# Patient Record
Sex: FEMALE | Race: WHITE | NOT HISPANIC OR LATINO | Employment: FULL TIME | URBAN - METROPOLITAN AREA
[De-identification: names, ages, dates, MRNs, and addresses within clinical notes are randomized per-mention and may not be internally consistent; named-entity substitution may affect disease eponyms.]

---

## 2017-05-21 DIAGNOSIS — F41.9 ANXIETY: ICD-10-CM

## 2017-05-21 NOTE — TELEPHONE ENCOUNTER
Buspirone       Last Written Prescription Date: 02/19/17  Last Fill Quantity: 360; # refills: 3  Last Office Visit with [facility], [facility] or  Health prescribing provider:  03/20/15        Last PHQ-9 score on record=   PHQ-9 SCORE 3/20/2015   Total Score 1       Lab Results   Component Value Date    AST 23 06/23/2014     Lab Results   Component Value Date    ALT 19 06/23/2014

## 2017-05-21 NOTE — LETTER
[facility]                                             [address] [place]  [address]    May 25, 2017    Zaida Lao  [address] [facility] [address]    Dear Zaida,       We recently received a refill request for busPIRone (BUSPAR) 15 MG tablet.  We have refilled this for a one time 30 day supply only because you are due for a:    Anxiety office visit      Please call at your earliest convenience so that there will not be a delay with your future refills.          Thank you,   Your [facility] Care Team/  [phone]

## 2017-05-22 NOTE — TELEPHONE ENCOUNTER
Patient last seen by Elzbieta Pulido in 2015.  Patient has seen Yara Han PA-C for anxiety on 11-25-16.  Will send request to her.

## 2017-05-23 NOTE — TELEPHONE ENCOUNTER
Patient was instructed to follow up with WALTER Chiang in April with anxiety.  [employer] message sent to patient.   See message for details.    Adele Mahajan RN

## 2017-05-24 RX ORDER — BUSPIRONE HYDROCHLORIDE 15 MG/1
TABLET ORAL
Qty: 120 TABLET | Refills: 0 | Status: SHIPPED | OUTPATIENT
Start: 2017-05-24 | End: 2017-06-30

## 2017-06-18 DIAGNOSIS — F41.9 ANXIETY: ICD-10-CM

## 2017-06-20 ENCOUNTER — TELEPHONE (OUTPATIENT)
Dept: FAMILY MEDICINE | Facility: CLINIC | Age: 42
End: 2017-06-20

## 2017-06-21 RX ORDER — ESCITALOPRAM OXALATE 20 MG/1
TABLET ORAL
Qty: 90 TABLET | Refills: 1 | Status: SHIPPED | OUTPATIENT
Start: 2017-06-21 | End: 2017-06-30

## 2017-06-30 ENCOUNTER — OFFICE VISIT (OUTPATIENT)
Dept: FAMILY MEDICINE | Facility: CLINIC | Age: 42
End: 2017-06-30
Payer: COMMERCIAL

## 2017-06-30 ENCOUNTER — RADIANT APPOINTMENT (OUTPATIENT)
Dept: MAMMOGRAPHY | Facility: CLINIC | Age: 42
End: 2017-06-30
Payer: COMMERCIAL

## 2017-06-30 VITALS
WEIGHT: 227 LBS | HEART RATE: 82 BPM | BODY MASS INDEX: 35.63 KG/M2 | SYSTOLIC BLOOD PRESSURE: 125 MMHG | DIASTOLIC BLOOD PRESSURE: 80 MMHG | HEIGHT: 67 IN | OXYGEN SATURATION: 99 %

## 2017-06-30 DIAGNOSIS — Z00.00 PREVENTATIVE HEALTH CARE: ICD-10-CM

## 2017-06-30 DIAGNOSIS — Z12.9 CANCER SCREENING: ICD-10-CM

## 2017-06-30 DIAGNOSIS — Z00.00 ROUTINE GENERAL MEDICAL EXAMINATION AT A HEALTH CARE FACILITY: Primary | ICD-10-CM

## 2017-06-30 DIAGNOSIS — J30.2 SEASONAL ALLERGIC RHINITIS, UNSPECIFIED CHRONICITY, UNSPECIFIED TRIGGER: ICD-10-CM

## 2017-06-30 DIAGNOSIS — F41.0 ANXIETY ATTACK: ICD-10-CM

## 2017-06-30 DIAGNOSIS — F41.9 ANXIETY: ICD-10-CM

## 2017-06-30 LAB
ANION GAP SERPL CALCULATED.3IONS-SCNC: 6 MMOL/L (ref 3–14)
BUN SERPL-MCNC: 11 MG/DL (ref 7–30)
CALCIUM SERPL-MCNC: 8.9 MG/DL (ref 8.5–10.1)
CHLORIDE SERPL-SCNC: 106 MMOL/L (ref 94–109)
CHOLEST SERPL-MCNC: 178 MG/DL
CO2 SERPL-SCNC: 29 MMOL/L (ref 20–32)
CREAT SERPL-MCNC: 0.72 MG/DL (ref 0.52–1.04)
GFR SERPL CREATININE-BSD FRML MDRD: NORMAL ML/MIN/1.7M2
GLUCOSE SERPL-MCNC: 86 MG/DL (ref 70–99)
HDLC SERPL-MCNC: 55 MG/DL
LDLC SERPL CALC-MCNC: 97 MG/DL
NONHDLC SERPL-MCNC: 123 MG/DL
POTASSIUM SERPL-SCNC: 4.3 MMOL/L (ref 3.4–5.3)
SODIUM SERPL-SCNC: 141 MMOL/L (ref 133–144)
TRIGL SERPL-MCNC: 128 MG/DL

## 2017-06-30 PROCEDURE — 87624 HPV HI-RISK TYP POOLED RSLT: CPT | Performed by: PHYSICIAN ASSISTANT

## 2017-06-30 PROCEDURE — 99396 PREV VISIT EST AGE 40-64: CPT | Performed by: PHYSICIAN ASSISTANT

## 2017-06-30 PROCEDURE — G0145 SCR C/V CYTO,THINLAYER,RESCR: HCPCS | Performed by: PHYSICIAN ASSISTANT

## 2017-06-30 PROCEDURE — G0202 SCR MAMMO BI INCL CAD: HCPCS | Mod: TC

## 2017-06-30 PROCEDURE — 80048 BASIC METABOLIC PNL TOTAL CA: CPT | Performed by: PHYSICIAN ASSISTANT

## 2017-06-30 PROCEDURE — 82306 VITAMIN D 25 HYDROXY: CPT | Performed by: PHYSICIAN ASSISTANT

## 2017-06-30 PROCEDURE — 80061 LIPID PANEL: CPT | Performed by: PHYSICIAN ASSISTANT

## 2017-06-30 PROCEDURE — 36415 COLL VENOUS BLD VENIPUNCTURE: CPT | Performed by: PHYSICIAN ASSISTANT

## 2017-06-30 RX ORDER — ALPRAZOLAM 0.25 MG
0.25 TABLET ORAL DAILY PRN
Qty: 20 TABLET | Refills: 0 | Status: SHIPPED | OUTPATIENT
Start: 2017-06-30 | End: 2018-08-10

## 2017-06-30 RX ORDER — ESCITALOPRAM OXALATE 20 MG/1
TABLET ORAL
Qty: 90 TABLET | Refills: 1 | Status: SHIPPED | OUTPATIENT
Start: 2017-06-30 | End: 2018-08-10

## 2017-06-30 RX ORDER — BUSPIRONE HYDROCHLORIDE 15 MG/1
TABLET ORAL
Qty: 120 TABLET | Refills: 0 | Status: SHIPPED | OUTPATIENT
Start: 2017-06-30 | End: 2017-09-08

## 2017-06-30 NOTE — PROGRESS NOTES
SUBJECTIVE:   CC: Zaida Lao is an 42 year old woman who presents for preventive health visit.     Healthy Habits:    Do you get at least three servings of calcium containing foods daily (dairy, green leafy vegetables, etc.)? yes    Amount of exercise or daily activities, outside of work: about 5 days a week     Problems taking medications regularly No    Medication side effects: no    Have you had an eye exam in the past two years? no    Do you see a dentist twice per year? yes  Do you have sleep apnea, excessive snoring or daytime drowsiness?no      Pt needs refill on Lexapro, Xanax and Buspar- history of anxiety. Would like to get off buspar and when she added lexapro anxiety is much better. Only uses xanax about 5 times a month.      wants checking vitamin D- no symptoms. History of anxiety.     Today's PHQ-2 Score:   PHQ-2 ( 1999 Pfizer) 11/25/2016 7/19/2014   Q1: Little interest or pleasure in doing things 0 0   Q2: Feeling down, depressed or hopeless 0 2   PHQ-2 Score 0 2       Abuse: Current or Past(Physical, Sexual or Emotional)- No  Do you feel safe in your environment - Yes    Social History   Substance Use Topics     Smoking status: Former Smoker     Types: Cigarettes     Smokeless tobacco: Never Used     Alcohol use No     The patient does not drink >3 drinks per day nor >7 drinks per week.    Reviewed orders with patient.  Reviewed health maintenance and updated orders accordingly - Yes  Labs reviewed in EPIC  BP Readings from Last 3 Encounters:   06/30/17 125/80   11/25/16 120/79   03/20/15 125/84    Wt Readings from Last 3 Encounters:   06/30/17 227 lb (103 kg)   11/25/16 220 lb 6.4 oz (100 kg)   03/20/15 235 lb 9.6 oz (106.9 kg)                  Patient Active Problem List   Diagnosis     Anxiety     CARDIOVASCULAR SCREENING; LDL GOAL LESS THAN 160     Seasonal allergies     Obesity     Kidney stones     Headache     Bilateral occipital neuralgia     Need for Tdap vaccination     Cough      Post-nasal drip     Nasal polyp     BMI 36.0-36.9,adult     Past Surgical History:   Procedure Laterality Date      SECTION  2011    Procedure: SECTION; Surgeon:CHRISSY BRITTON; Location:[facility] L[facility]      SECTION  2014    Procedure:  SECTION;  Surgeon: Chrissy Britton MD;  Location: [facility] L+[facility]     GYN SURGERY      tubal ligation     HC TOOTH EXTRACTION W/FORCEP      one dry socket     RW DENTAL (ABSTRACTED)       SURGICAL HISTORY OF -       lithotripsy       Social History   Substance Use Topics     Smoking status: Former Smoker     Types: Cigarettes     Smokeless tobacco: Never Used     Alcohol use No     Family History   Problem Relation Age of Onset     C.A.D. Paternal Grandfather       of MI     Breast Cancer Maternal Grandmother      Cancer - colorectal Maternal Grandmother      Circulatory Maternal Grandmother      aneurism     Prostate Cancer Maternal Grandfather      Eye Disorder Father      detached retina     Genitourinary Problems Father      kidney stones     CEREBROVASCULAR DISEASE Paternal Grandmother          Current Outpatient Prescriptions   Medication Sig Dispense Refill     escitalopram (LEXAPRO) 20 MG tablet TAKE 1 TABLET (20 MG) BY MOUTH DAILY 90 tablet 1     busPIRone (BUSPAR) 15 MG tablet TAKE TWO TABLETS BY MOUTH TWICE DAILY 120 tablet 0     ALPRAZolam (XANAX) 0.25 MG tablet Take 1 tablet (0.25 mg) by mouth daily as needed for anxiety Do not mix with alcohol or drive after taking 20 tablet 0     cetirizine (ZYRTEC) 10 MG tablet Take 10 mg by mouth daily       [DISCONTINUED] escitalopram (LEXAPRO) 20 MG tablet TAKE 1 TABLET (20 MG) BY MOUTH DAILY 90 tablet 1       Patient under age 50, mutual decision reflected in health maintenance.      Pertinent mammograms are reviewed under the imaging tab.  History of abnormal Pap smear: NO - age 30- 65 PAP every 3 years recommended    Reviewed and updated as needed this visit by clinical  "staff  Tobacco  Allergies  Meds  Problems  Med Hx  Surg Hx  Fam Hx  Soc Hx          Reviewed and updated as needed this visit by Provider  Allergies  Meds  Problems          Past Medical History:   Diagnosis Date     Human papillomavirus in conditions classified elsewhere and of unspecified site     genital warts , resolved     Seasonal allergies     Zyrtec helps and has an Rx for Flonase      Past Surgical History:   Procedure Laterality Date      SECTION  2011    Procedure: SECTION; Surgeon:CHRISSY BRITTON; [facility]      SECTION  2014    Procedure:  SECTION;  Surgeon: Chrissy Britton MD;  Location[facility]     GYN SURGERY      tubal ligation     HC TOOTH EXTRACTION W/FORCEP      one dry socket     RW DENTAL (ABSTRACTED)       SURGICAL HISTORY OF -       lithotripsy       ROS:  C: NEGATIVE for fever, chills, change in weight  I: NEGATIVE for worrisome rashes, moles or lesions  E: NEGATIVE for vision changes or irritation  ENT: NEGATIVE for ear, mouth and throat problems  R: NEGATIVE for significant cough or SOB  B: NEGATIVE for masses, tenderness or discharge  CV: NEGATIVE for chest pain, palpitations or peripheral edema  GI: NEGATIVE for nausea, abdominal pain, heartburn, or change in bowel habits  : NEGATIVE for unusual urinary or vaginal symptoms. Periods are regular.  M: NEGATIVE for significant arthralgias or myalgia  N: NEGATIVE for weakness, dizziness or paresthesias  P: NEGATIVE for changes in mood or affect      OBJECTIVE:   /80  Pulse 82  Ht 5' 6.5\" (1.689 m)  Wt 227 lb (103 kg)  LMP 2017 (Approximate)  SpO2 99%  BMI 36.09 kg/m2  EXAM:  GENERAL: healthy, alert and no distress  EYES: Eyes grossly normal to inspection, PERRL and conjunctivae and sclerae normal  HENT: ear canals and TM's normal, nose and mouth without ulcers or lesions  NECK: no adenopathy, no asymmetry, masses, or scars and thyroid normal " to palpation  RESP: lungs clear to auscultation - no rales, rhonchi or wheezes  BREAST: normal without masses, tenderness or nipple discharge and no palpable axillary masses or adenopathy  CV: regular rate and rhythm, normal S1 S2, no S3 or S4, no murmur, click or rub, no peripheral edema and peripheral pulses strong  ABDOMEN: soft, nontender, no hepatosplenomegaly, no masses and bowel sounds normal   (female): normal female external genitalia, normal urethral meatus, vaginal mucosa pink, moist, well rugated, and normal cervix/adnexa/uterus without masses or discharge  MS: no gross musculoskeletal defects noted, no edema  SKIN: no suspicious lesions or rashes  NEURO: Normal strength and tone, mentation intact and speech normal  PSYCH: mentation appears normal, affect normal/bright  MA was present on exam     ASSESSMENT/PLAN:       ICD-10-CM    1. Routine general medical examination at a health care facility Z00.00 Lipid panel reflex to direct LDL     Basic metabolic panel     Vitamin D Deficiency     Pap imaged thin layer screen with HPV - recommended age 30 - 65 years (select HPV order below)   2. Anxiety F41.9 escitalopram (LEXAPRO) 20 MG tablet     busPIRone (BUSPAR) 15 MG tablet   3. Anxiety attack F41.0 ALPRAZolam (XANAX) 0.25 MG tablet   4. Seasonal allergic rhinitis, unspecified chronicity, unspecified trigger J30.2    5. BMI 36.0-36.9,adult Z68.36    6. Cancer screening Z12.9    1. Labs pending. Work on Healthy diet and exercise. Getting heart rate elevated for 30 mins most days of week.  2. At her request will taper off buspar. See Patient Instructions   4. Stable  5. Work on Healthy diet and exercise. Getting heart rate elevated for 30 mins most days of week.  6. Has Mammogram after this apt.     COUNSELING:   Reviewed preventive health counseling, as reflected in patient instructions       Regular exercise       Healthy diet/nutrition         reports that she has quit smoking. Her smoking use included  "Cigarettes. She has never used smokeless tobacco.    Estimated body mass index is 36.09 kg/(m^2) as calculated from the following:    Height as of this encounter: 5' 6.5\" (1.689 m).    Weight as of this encounter: 227 lb (103 kg).   Weight management plan: Discussed healthy diet and exercise guidelines and patient will follow up in 12 months in clinic to re-evaluate.    Counseling Resources:  ATP IV Guidelines  Pooled Cohorts Equation Calculator  Breast Cancer Risk Calculator  FRAX Risk Assessment  ICSI Preventive Guidelines  Dietary Guidelines for Americans, 2010  USDA's MyPlate  ASA Prophylaxis  Lung CA Screening    Juan Cortes PA-C  [facility]  "

## 2017-06-30 NOTE — MR AVS SNAPSHOT
After Visit Summary   6/30/2017    Zaida Lao    MRN: [id number]           Patient Information     Date Of Birth          1975        Visit Information        Provider Department      6/30/2017 10:20 AM Juan Cortes PA-C [facility]        Today's Diagnoses     Routine general medical examination at a health care facility    -  1    Anxiety        Anxiety attack        Seasonal allergic rhinitis, unspecified chronicity, unspecified trigger        BMI 36.0-36.9,adult        Cancer screening          Care Instructions      Preventive Health Recommendations  Female Ages 40 to 49    Yearly exam:     See your health care provider every year in order to  1. Review health changes.   2. Discuss preventive care.    3. Review your medicines if your doctor prescribed any.      Get a Pap test every three years (unless you have an abnormal result and your provider advises testing more often).      If you get Pap tests with HPV test, you only need to test every 5 years, unless you have an abnormal result. You do not need a Pap test if your uterus was removed (hysterectomy) and you have not had cancer.      You should be tested each year for STDs (sexually transmitted diseases), if you're at risk.       Ask your doctor if you should have a mammogram.      Have a colonoscopy (test for colon cancer) if someone in your family has had colon cancer or polyps before age 50.       Have a cholesterol test every 5 years.       Have a diabetes test (fasting glucose) after age 45. If you are at risk for diabetes, you should have this test every 3 years.    Shots: Get a flu shot each year. Get a tetanus shot every 10 years.     Nutrition:     Eat at least 5 servings of fruits and vegetables each day.    Eat whole-grain bread, whole-wheat pasta and brown rice instead of white grains and rice.    Talk to your provider about Calcium and Vitamin D.     Lifestyle    Exercise at least 150 minutes a  week (an average of 30 minutes a day, 5 days a week). This will help you control your weight and prevent disease.    Limit alcohol to one drink per day.    No smoking.     Wear sunscreen to prevent skin cancer.    See your dentist every six months for an exam and cleaning.    Ok to taper buspar to 1 tablet twice a day for 4 wks then daily for 4 wks then stop.               Follow-ups after your visit        Your next 10 appointments already scheduled     Jun 30, 2017 11:15 AM CDT   MA SCREENING DIGITAL BILATERAL with ANDMA1   [facility] ([facility])    [address] [address] [phone]           Do not use any powder, lotion or deodorant under your arms or on your breast. If you do, we will ask you to remove it before your exam.  Wear comfortable, two-piece clothing.  If you have any allergies, tell your care team.  Bring any previous mammograms from other facilities or have them mailed to the breast center.              Who to contact     If you have questions or need follow up information about today's clinic visit or your schedule please contact [facility] directly at [phone].  Normal or non-critical lab and imaging results will be communicated to you by [employer], letter or phone within 4 business days after the clinic has received the results. If you do not hear from us within 7 days, please contact the clinic through [facility] or phone. If you have a critical or abnormal lab result, we will notify you by phone as soon as possible.  Submit refill requests through [employer] or call your pharmacy and they will forward the refill request to us. Please allow 3 business days for your refill to be completed.          Additional Information About Your Visit        [employer] Information     [employer] gives you secure access to your electronic health record. If you see a primary care provider, you can also send messages to your care team and make  "appointments. If you have questions, please call your primary care clinic.  If you do not have a primary care provider, please call [phone] and they will assist you.        Care EveryWhere ID     This is your Care EveryWhere ID. This could be used by other organizations to access your [place] medical records  [id number]        Your Vitals Were     Pulse Height Last Period Pulse Oximetry BMI (Body Mass Index)       82 5' 6.5\" (1.689 m) 06/13/2017 (Approximate) 99% 36.09 kg/m2        Blood Pressure from Last 3 Encounters:   06/30/17 125/80   11/25/16 120/79   03/20/15 125/84    Weight from Last 3 Encounters:   06/30/17 227 lb (103 kg)   11/25/16 220 lb 6.4 oz (100 kg)   03/20/15 235 lb 9.6 oz (106.9 kg)              We Performed the Following     Basic metabolic panel     Lipid panel reflex to direct LDL     Pap imaged thin layer screen with HPV - recommended age 30 - 65 years (select HPV order below)     Vitamin D Deficiency          Today's Medication Changes          These changes are accurate as of: 6/30/17 11:01 AM.  If you have any questions, ask your nurse or doctor.               These medicines have changed or have updated prescriptions.        Dose/Directions    ALPRAZolam 0.25 MG tablet   Commonly known as:  XANAX   This may have changed:  when to take this   Used for:  Anxiety attack   Changed by:  Juan Cortes PA-C        Dose:  0.25 mg   Take 1 tablet (0.25 mg) by mouth daily as needed for anxiety Do not mix with alcohol or drive after taking   Quantity:  20 tablet   Refills:  0       busPIRone 15 MG tablet   Commonly known as:  BUSPAR   This may have changed:  See the new instructions.   Used for:  Anxiety   Changed by:  Juan Cortes PA-C        TAKE TWO TABLETS BY MOUTH TWICE DAILY   Quantity:  120 tablet   Refills:  0       escitalopram 20 MG tablet   Commonly known as:  LEXAPRO   This may have changed:  See the new instructions.   Used for:  Anxiety   Changed by:  Sophia" Juan Herrmann PA-C        TAKE 1 TABLET (20 MG) BY MOUTH DAILY   Quantity:  90 tablet   Refills:  1            Where to get your medicines      These medications were sent to [facility] [address], [address]  [address], [address], [facility] [address]     Phone:  [phone]     busPIRone 15 MG tablet    escitalopram 20 MG tablet         Some of these will need a paper prescription and others can be bought over the counter.  Ask your nurse if you have questions.     Bring a paper prescription for each of these medications     ALPRAZolam 0.25 MG tablet                Primary Care Provider Office Phone # Fax #    Elzbieta Pulido PA-C [phone]       [facility] [place] [address]  [place] [address]        Equal Access to Services     DENA MEDEIROS : Hadii aad ku hadasho Soomaali, waaxda luqadaha, qaybta kaalmada adeegyada, waxay mjin hayshay mo . So [facility] [phone].    ATENCIÓN: Si habla español, tiene a wang disposición servicios gratuitos de asistencia lingüística. [facility] [phone].    We comply with applicable federal civil rights laws and [place] laws. We do not discriminate on the basis of race, color, national origin, age, disability sex, sexual orientation or gender identity.            Thank you!     Thank you for choosing [facility]  for your care. Our goal is always to provide you with excellent care. Hearing back from our patients is one way we can continue to improve our services. Please take a few minutes to complete the written survey that you may receive in the mail after your visit with us. Thank you!             Your Updated Medication List - Protect others around you: Learn how to safely use, store and throw away your medicines at www.disposemymeds.org.          This list is accurate as of: 6/30/17 11:01 AM.  Always use your most recent med list.                   Brand Name Dispense  Instructions for use Diagnosis    ALPRAZolam 0.25 MG tablet    XANAX    20 tablet    Take 1 tablet (0.25 mg) by mouth daily as needed for anxiety Do not mix with alcohol or drive after taking    Anxiety attack       busPIRone 15 MG tablet    BUSPAR    120 tablet    TAKE TWO TABLETS BY MOUTH TWICE DAILY    Anxiety       cetirizine 10 MG tablet    zyrTEC     Take 10 mg by mouth daily    Previous  delivery, antepartum condition or complication       escitalopram 20 MG tablet    LEXAPRO    90 tablet    TAKE 1 TABLET (20 MG) BY MOUTH DAILY    Anxiety

## 2017-06-30 NOTE — NURSING NOTE
"Chief Complaint   Patient presents with     Physical       Initial /80  Pulse 82  Ht 5' 6.5\" (1.689 m)  Wt 227 lb (103 kg)  SpO2 99%  BMI 36.09 kg/m2 Estimated body mass index is 36.09 kg/(m^2) as calculated from the following:    Height as of this encounter: 5' 6.5\" (1.689 m).    Weight as of this encounter: 227 lb (103 kg).  Medication Reconciliation: complete  Deepti Collins CMA    "

## 2017-06-30 NOTE — PATIENT INSTRUCTIONS
Preventive Health Recommendations  Female Ages 40 to 49    Yearly exam:     See your health care provider every year in order to  1. Review health changes.   2. Discuss preventive care.    3. Review your medicines if your doctor prescribed any.      Get a Pap test every three years (unless you have an abnormal result and your provider advises testing more often).      If you get Pap tests with HPV test, you only need to test every 5 years, unless you have an abnormal result. You do not need a Pap test if your uterus was removed (hysterectomy) and you have not had cancer.      You should be tested each year for STDs (sexually transmitted diseases), if you're at risk.       Ask your doctor if you should have a mammogram.      Have a colonoscopy (test for colon cancer) if someone in your family has had colon cancer or polyps before age 50.       Have a cholesterol test every 5 years.       Have a diabetes test (fasting glucose) after age 45. If you are at risk for diabetes, you should have this test every 3 years.    Shots: Get a flu shot each year. Get a tetanus shot every 10 years.     Nutrition:     Eat at least 5 servings of fruits and vegetables each day.    Eat whole-grain bread, whole-wheat pasta and brown rice instead of white grains and rice.    Talk to your provider about Calcium and Vitamin D.     Lifestyle    Exercise at least 150 minutes a week (an average of 30 minutes a day, 5 days a week). This will help you control your weight and prevent disease.    Limit alcohol to one drink per day.    No smoking.     Wear sunscreen to prevent skin cancer.    See your dentist every six months for an exam and cleaning.    Ok to taper buspar to 1 tablet twice a day for 4 wks then daily for 4 wks then stop.

## 2017-07-01 LAB — DEPRECATED CALCIDIOL+CALCIFEROL SERPL-MC: 45 UG/L (ref 20–75)

## 2017-07-06 LAB
COPATH REPORT: NORMAL
PAP: NORMAL

## 2017-07-07 LAB
FINAL DIAGNOSIS: NORMAL
HPV HR 12 DNA CVX QL NAA+PROBE: NEGATIVE
HPV16 DNA SPEC QL NAA+PROBE: NEGATIVE
HPV18 DNA SPEC QL NAA+PROBE: NEGATIVE
SPECIMEN DESCRIPTION: NORMAL

## 2017-12-26 DIAGNOSIS — F41.9 ANXIETY: ICD-10-CM

## 2017-12-28 RX ORDER — ESCITALOPRAM OXALATE 20 MG/1
TABLET ORAL
Qty: 90 TABLET | Refills: 1 | Status: SHIPPED | OUTPATIENT
Start: 2017-12-28 | End: 2018-06-25

## 2017-12-28 NOTE — TELEPHONE ENCOUNTER
Prescription approved per [facility] Refill Protocol.  Key Whatley RN

## 2018-06-25 DIAGNOSIS — F41.9 ANXIETY: ICD-10-CM

## 2018-06-25 NOTE — LETTER
June 28, 2018    Zaida Lao  [address] [facility] [address]    Dear Zaida,       We recently received a refill request for escitalopram (LEXAPRO) 20 MG tablet.  We have refilled this for a one time 30 day supply only because you are due for a:    Anxiety office visit      Please call at your earliest convenience so that there will not be a delay with your future refills.          Thank you,   Your [facility] Team/  [phone]

## 2018-06-28 RX ORDER — ESCITALOPRAM OXALATE 20 MG/1
TABLET ORAL
Qty: 30 TABLET | Refills: 0 | Status: SHIPPED | OUTPATIENT
Start: 2018-06-28 | End: 2018-08-10

## 2018-06-28 NOTE — TELEPHONE ENCOUNTER
Medication is being filled for 1 time refill only due to:  Patient needs to be seen because due for anxiety ov..   Brenda Stewart BSN, RN

## 2018-07-30 NOTE — PROGRESS NOTES
SUBJECTIVE:                                                    Zaida Lao is a 43 year old female who presents to clinic today for the following health issues:        Anxiety Follow-Up    Status since last visit: she likes lexapro/ she wonders if she should be taking a high dose    Other associated symptoms:None    Complicating factors:   Significant life event: No   Current substance abuse: None  Depression symptoms: No  ROMANA-7 SCORE 2014 3/20/2015 2016   Total Score 12 13 -   Total Score - - 19   Total Score - - -       ROMANA-7    Amount of exercise or physical activity: 4-5 days/week for an average of 45-60 minutes    Problems taking medications regularly: No    Medication side effects: none    Diet: regular (no restrictions)    Seeing counselor PRN.   Needing xanax less than 5 x per month.   lexapro working a lot better than buspar.  Is off buspar. buspar did not help her.   No side effects.       Problem list and histories reviewed & adjusted, as indicated.  Additional history: as documented    Patient Active Problem List   Diagnosis     Anxiety     CARDIOVASCULAR SCREENING; LDL GOAL LESS THAN 160     Seasonal allergies     Obesity     Kidney stones     Headache     Bilateral occipital neuralgia     Need for Tdap vaccination     Cough     Post-nasal drip     Nasal polyp     BMI 36.0-36.9,adult     Past Surgical History:   Procedure Laterality Date      SECTION  2011    Procedure: SECTION; Surgeon:CHRISSY BRITTON; Location:[facility]      SECTION  2014    Procedure:  SECTION;  Surgeon: Chrissy Britton MD;  Location: [facility]     GYN SURGERY      tubal ligation     HC TOOTH EXTRACTION W/FORCEP      one dry socket     RW DENTAL (ABSTRACTED)  1985     SURGICAL HISTORY OF -       lithotripsy       Social History   Substance Use Topics     Smoking status: Former Smoker     Types: Cigarettes     Smokeless tobacco: Never Used     Alcohol use No     " Family History   Problem Relation Age of Onset     C.A.D. Paternal Grandfather       of MI     Breast Cancer Maternal Grandmother      Cancer - colorectal Maternal Grandmother      Circulatory Maternal Grandmother      aneurism     Prostate Cancer Maternal Grandfather      Eye Disorder Father      detached retina     Genitourinary Problems Father      kidney stones     Cerebrovascular Disease Paternal Grandmother          Current Outpatient Prescriptions   Medication Sig Dispense Refill     ALPRAZolam (XANAX) 0.25 MG tablet Take 1 tablet (0.25 mg) by mouth daily as needed for anxiety Do not mix with alcohol or drive after taking 30 tablet 0     escitalopram (LEXAPRO) 20 MG tablet TAKE 1 TABLET (20 MG) BY MOUTH DAILY 90 tablet 3     fluticasone (FLONASE) 50 MCG/ACT spray Spray 1 spray into both nostrils daily       [DISCONTINUED] escitalopram (LEXAPRO) 20 MG tablet TAKE 1 TABLET (20 MG) BY MOUTH DAILY 90 tablet 1     [DISCONTINUED] escitalopram (LEXAPRO) 20 MG tablet TAKE 1 TABLET (20 MG) BY MOUTH DAILY 30 tablet 0     [DISCONTINUED] escitalopram (LEXAPRO) 20 MG tablet TAKE 1 TABLET (20 MG) BY MOUTH DAILY 90 tablet 1     Allergies   Allergen Reactions     Seasonal Allergies      Cold symptoms, stuffy nose, itchy throat and eyes     Sulfa Drugs      not effective       ROS:  Constitutional, HEENT, cardiovascular, pulmonary, GI, , musculoskeletal, neuro, skin, endocrine and psych systems are negative, except as otherwise noted.    OBJECTIVE:     /70  Pulse 79  Temp 99  F (37.2  C) (Oral)  Resp 20  Ht 5' 6.5\" (1.689 m)  Wt 233 lb (105.7 kg)  SpO2 98%  BMI 37.04 kg/m2  Body mass index is 37.04 kg/(m^2).  GENERAL: healthy, alert and no distress  RESP: lungs clear to auscultation - no rales, rhonchi or wheezes  CV: regular rate and rhythm, normal S1 S2, no S3 or S4, no murmur, click or rub, no peripheral edema and peripheral pulses strong  MS: no gross musculoskeletal defects noted, no edema  PSYCH: " mentation appears normal, affect normal/bright    Diagnostic Test Results:  none     ASSESSMENT/PLAN:     ASSESSMENT / PLAN:  (F41.9) Anxiety  Comment:   Plan: escitalopram (LEXAPRO) 20 MG tablet,         DISCONTINUED: escitalopram (LEXAPRO) 20 MG         tablet            (F41.0) Anxiety attack  Comment:   Plan: ALPRAZolam (XANAX) 0.25 MG tablet          Stable, see below  Consider increasing lexapro in future if needed    Patient Instructions   Continue on lexapro  Continue taking xanax 5 or less times per month  If you notice you are needing more xanax per month then come in and we can discuss other options        Yara Han PA-C  [facility]

## 2018-08-08 ENCOUNTER — COMMUNICATION - HEALTHEAST (OUTPATIENT)
Dept: SCHEDULING | Facility: CLINIC | Age: 43
End: 2018-08-08

## 2018-08-10 ENCOUNTER — OFFICE VISIT (OUTPATIENT)
Dept: FAMILY MEDICINE | Facility: CLINIC | Age: 43
End: 2018-08-10
Payer: COMMERCIAL

## 2018-08-10 ENCOUNTER — RADIANT APPOINTMENT (OUTPATIENT)
Dept: MAMMOGRAPHY | Facility: CLINIC | Age: 43
End: 2018-08-10
Payer: COMMERCIAL

## 2018-08-10 VITALS
WEIGHT: 233 LBS | HEIGHT: 67 IN | HEART RATE: 79 BPM | BODY MASS INDEX: 36.57 KG/M2 | SYSTOLIC BLOOD PRESSURE: 100 MMHG | RESPIRATION RATE: 20 BRPM | TEMPERATURE: 99 F | DIASTOLIC BLOOD PRESSURE: 70 MMHG | OXYGEN SATURATION: 98 %

## 2018-08-10 DIAGNOSIS — F41.9 ANXIETY: ICD-10-CM

## 2018-08-10 DIAGNOSIS — F41.0 ANXIETY ATTACK: ICD-10-CM

## 2018-08-10 DIAGNOSIS — Z12.31 VISIT FOR SCREENING MAMMOGRAM: ICD-10-CM

## 2018-08-10 PROCEDURE — 77067 SCR MAMMO BI INCL CAD: CPT | Mod: TC

## 2018-08-10 PROCEDURE — 99213 OFFICE O/P EST LOW 20 MIN: CPT | Performed by: PHYSICIAN ASSISTANT

## 2018-08-10 RX ORDER — ESCITALOPRAM OXALATE 20 MG/1
TABLET ORAL
Qty: 90 TABLET | Refills: 1 | Status: SHIPPED | OUTPATIENT
Start: 2018-08-10 | End: 2018-08-10

## 2018-08-10 RX ORDER — FLUTICASONE PROPIONATE 50 MCG
1 SPRAY, SUSPENSION (ML) NASAL DAILY
COMMUNITY

## 2018-08-10 RX ORDER — ALPRAZOLAM 0.25 MG
0.25 TABLET ORAL DAILY PRN
Qty: 30 TABLET | Refills: 0 | Status: SHIPPED | OUTPATIENT
Start: 2018-08-10 | End: 2020-11-13

## 2018-08-10 RX ORDER — ESCITALOPRAM OXALATE 20 MG/1
TABLET ORAL
Qty: 90 TABLET | Refills: 3 | Status: SHIPPED | OUTPATIENT
Start: 2018-08-10 | End: 2019-10-27

## 2018-08-10 RX ORDER — ESCITALOPRAM OXALATE 20 MG/1
TABLET ORAL
Qty: 30 TABLET | Refills: 0 | OUTPATIENT
Start: 2018-08-10

## 2018-08-10 ASSESSMENT — ANXIETY QUESTIONNAIRES
GAD7 TOTAL SCORE: 10
6. BECOMING EASILY ANNOYED OR IRRITABLE: MORE THAN HALF THE DAYS
3. WORRYING TOO MUCH ABOUT DIFFERENT THINGS: MORE THAN HALF THE DAYS
7. FEELING AFRAID AS IF SOMETHING AWFUL MIGHT HAPPEN: SEVERAL DAYS
2. NOT BEING ABLE TO STOP OR CONTROL WORRYING: MORE THAN HALF THE DAYS
5. BEING SO RESTLESS THAT IT IS HARD TO SIT STILL: NOT AT ALL
1. FEELING NERVOUS, ANXIOUS, OR ON EDGE: MORE THAN HALF THE DAYS
IF YOU CHECKED OFF ANY PROBLEMS ON THIS QUESTIONNAIRE, HOW DIFFICULT HAVE THESE PROBLEMS MADE IT FOR YOU TO DO YOUR WORK, TAKE CARE OF THINGS AT HOME, OR GET ALONG WITH OTHER PEOPLE: NOT DIFFICULT AT ALL

## 2018-08-10 ASSESSMENT — PATIENT HEALTH QUESTIONNAIRE - PHQ9: 5. POOR APPETITE OR OVEREATING: SEVERAL DAYS

## 2018-08-10 NOTE — MR AVS SNAPSHOT
After Visit Summary   8/10/2018    Zaida Lao    MRN: [id number]           Patient Information     Date Of Birth          1975        Visit Information        Provider Department      8/10/2018 9:00 AM Yara Han PA-C [facility]        Today's Diagnoses     Anxiety        Anxiety attack          Care Instructions    Continue on lexapro  Continue taking xanax 5 or less times per month  If you notice you are needing more xanax per month then come in and we can discuss other options          Follow-ups after your visit        Who to contact     If you have questions or need follow up information about today's clinic visit or your schedule please contact [facility] directly at [phone].  Normal or non-critical lab and imaging results will be communicated to you by [employer], letter or phone within 4 business days after the clinic has received the results. If you do not hear from us within 7 days, please contact the clinic through [employer] or phone. If you have a critical or abnormal lab result, we will notify you by phone as soon as possible.  Submit refill requests through [employer] or call your pharmacy and they will forward the refill request to us. Please allow 3 business days for your refill to be completed.          Additional Information About Your Visit        MyChart Information     [employer] gives you secure access to your electronic health record. If you see a primary care provider, you can also send messages to your care team and make appointments. If you have questions, please call your primary care clinic.  If you do not have a primary care provider, please call [phone] and they will assist you.        Care EveryWhere ID     This is your Care EveryWhere ID. This could be used by other organizations to access your [place] medical records  [id number]        Your Vitals Were     Pulse Temperature Respirations Height Pulse Oximetry BMI  "(Body Mass Index)    79 99  F (37.2  C) (Oral) 20 5' 6.5\" (1.689 m) 98% 37.04 kg/m2       Blood Pressure from Last 3 Encounters:   08/10/18 100/70   06/30/17 125/80   11/25/16 120/79    Weight from Last 3 Encounters:   08/10/18 233 lb (105.7 kg)   06/30/17 227 lb (103 kg)   11/25/16 220 lb 6.4 oz (100 kg)              Today, you had the following     No orders found for display         Today's Medication Changes          These changes are accurate as of 8/10/18  9:44 AM.  If you have any questions, ask your nurse or doctor.               Start taking these medicines.        Dose/Directions    escitalopram 20 MG tablet   Commonly known as:  LEXAPRO   Used for:  Anxiety   Started by:  Yara Han PA-C        TAKE 1 TABLET (20 MG) BY MOUTH DAILY   Quantity:  90 tablet   Refills:  3            Where to get your medicines      These medications were sent to [place] [id number] [place] [facility] [place], [place] - [address] [place] [address]  [address] [facility] [address]     Phone:  [phone]     escitalopram 20 MG tablet         Some of these will need a paper prescription and others can be bought over the counter.  Ask your nurse if you have questions.     Bring a paper prescription for each of these medications     ALPRAZolam 0.25 MG tablet                Primary Care Provider Office Phone # Fax #    Elzbieta Pulido PA-C [phone]       [address]  [place] [address]        Equal Access to Services     [facility]: Hadii aad ku hadasho Soomaali, waaxda luqadaha, qaybta kaalmada adeegyada, mikayla idiin hayaan adeeg kharash la'aan . So [facility] [phone].    ATENCIÓN: Si habla español, tiene a wang disposición servicios gratuitos de asistencia lingüística. Llame al [phone].    We comply with applicable federal civil rights laws and [place] laws. We do not discriminate on the basis of race, color, national origin, age, disability, sex, sexual orientation, or gender " identity.            Thank you!     Thank you for choosing [facility]  for [facility]. Our goal is always to provide you with excellent care. Hearing back from our patients is one way we can continue to improve our services. Please take a few minutes to complete the written survey that you may receive in the mail after your visit with us. Thank you!             Your Updated Medication List - Protect others around you: Learn how to safely use, store and throw away your medicines at www.disposemymeds.org.          This list is accurate as of 8/10/18  9:44 AM.  Always use your most recent med list.                   Brand Name Dispense Instructions for use Diagnosis    ALPRAZolam 0.25 MG tablet    XANAX    30 tablet    Take 1 tablet (0.25 mg) by mouth daily as needed for anxiety Do not mix with alcohol or drive after taking    Anxiety attack       escitalopram 20 MG tablet    LEXAPRO    90 tablet    TAKE 1 TABLET (20 MG) BY MOUTH DAILY    Anxiety       fluticasone 50 MCG/ACT spray    FLONASE     Spray 1 spray into both nostrils daily

## 2018-08-10 NOTE — PATIENT INSTRUCTIONS
Continue on lexapro  Continue taking xanax 5 or less times per month  If you notice you are needing more xanax per month then come in and we can discuss other options

## 2018-08-11 ASSESSMENT — ANXIETY QUESTIONNAIRES: GAD7 TOTAL SCORE: 10

## 2018-08-11 ASSESSMENT — PATIENT HEALTH QUESTIONNAIRE - PHQ9: SUM OF ALL RESPONSES TO PHQ QUESTIONS 1-9: 2

## 2019-01-14 ENCOUNTER — OFFICE VISIT (OUTPATIENT)
Dept: OPTOMETRY | Facility: CLINIC | Age: 44
End: 2019-01-14
Payer: COMMERCIAL

## 2019-01-14 DIAGNOSIS — H52.4 PRESBYOPIA: Primary | ICD-10-CM

## 2019-01-14 DIAGNOSIS — H04.123 DRY EYES: ICD-10-CM

## 2019-01-14 PROCEDURE — 92004 COMPRE OPH EXAM NEW PT 1/>: CPT | Performed by: OPTOMETRIST

## 2019-01-14 PROCEDURE — 92015 DETERMINE REFRACTIVE STATE: CPT | Performed by: OPTOMETRIST

## 2019-01-14 ASSESSMENT — CUP TO DISC RATIO
OS_RATIO: 0.15
OD_RATIO: 0.15

## 2019-01-14 ASSESSMENT — SLIT LAMP EXAM - LIDS
COMMENTS: NORMAL
COMMENTS: NORMAL

## 2019-01-14 ASSESSMENT — KERATOMETRY
OD_AXISANGLE2_DEGREES: 5
OS_AXISANGLE2_DEGREES: 177
OS_K1POWER_DIOPTERS: 45.25
OD_K1POWER_DIOPTERS: 45.00
OD_K2POWER_DIOPTERS: 46.00
OS_K2POWER_DIOPTERS: 46.25

## 2019-01-14 ASSESSMENT — REFRACTION_MANIFEST
OS_CYLINDER: SPHERE
OD_SPHERE: 0.00
OS_SPHERE: PLANO
METHOD_AUTOREFRACTION: 1
OS_ADD: +1.00
OD_SPHERE: PLANO
OD_CYLINDER: SPHERE
OD_ADD: +1.00
OS_SPHERE: -0.00

## 2019-01-14 ASSESSMENT — VISUAL ACUITY
OS_SC: 20/20
OD_SC: 20/20
OD_SC: 20/20
OD_SC+: -1
OS_SC: 20/20
METHOD: SNELLEN - LINEAR

## 2019-01-14 ASSESSMENT — EXTERNAL EXAM - RIGHT EYE: OD_EXAM: NORMAL

## 2019-01-14 ASSESSMENT — CONF VISUAL FIELD
OS_NORMAL: 1
METHOD: COUNTING FINGERS
OD_NORMAL: 1

## 2019-01-14 ASSESSMENT — EXTERNAL EXAM - LEFT EYE: OS_EXAM: NORMAL

## 2019-01-14 ASSESSMENT — TONOMETRY
OS_IOP_MMHG: 10
IOP_METHOD: APPLANATION
OD_IOP_MMHG: 10

## 2019-01-14 NOTE — PATIENT INSTRUCTIONS
Patient was advised of today's exam findings.  Over the counter reading glasses as needed +1.00 or +1.25 readers  Use over the counter artificial tears 2 times a day as needed ( Thera Tears, Systane Ultra or Refresh Optive)    Return in 1-2 years for eye exam    Liz Rader O.D.  [facility]   [address] [place] [address] [phone]

## 2019-01-14 NOTE — PROGRESS NOTES
Chief Complaint   Patient presents with     Annual Eye Exam         Last Eye Exam: About 10 years ago   Dilated Previously: Yes    What are you currently using to see?  does not use glasses or contacts       Distance Vision Acuity: Satisfied with vision for the most part, night driving has gotten a little tougher, also feels strained. Occasional floaters     Near Vision Acuity: Satisfied with vision while reading and using computer unaided. Did mention that she feels like she has started to adjust where she holds things     Eye Comfort: dry and tired and strained. Feels like the left eye is worse. Occasional floaters in both eyes  , denies flashes   Do you use eye drops? : Not usually, on rare occasion she will use a generic tear or Visine   Occupation or Hobbies: Dental Hygienist     Orin Baird Optometric Assistant           Medical, surgical and family histories reviewed and updated 1/14/2019.       OBJECTIVE: See Ophthalmology exam    ASSESSMENT:    ICD-10-CM    1. Presbyopia H52.4 EYE EXAM (SIMPLE-NONBILLABLE)     REFRACTION   2. Dry eyes H04.123 EYE EXAM (SIMPLE-NONBILLABLE)     REFRACTION      PLAN:     Patient Instructions   Patient was advised of today's exam findings.  Over the counter reading glasses as needed +1.00 or +1.25 readers  Use over the counter artificial tears 2 times a day as needed ( Thera Tears, Systane Ultra or Refresh Optive)    Return in 1-2 years for eye exam    Liz Rader O.D.  [facility]   [address] [place] [address] [phone]

## 2019-03-09 ENCOUNTER — ANCILLARY PROCEDURE (OUTPATIENT)
Dept: GENERAL RADIOLOGY | Facility: CLINIC | Age: 44
End: 2019-03-09
Attending: PEDIATRICS
Payer: COMMERCIAL

## 2019-03-09 ENCOUNTER — OFFICE VISIT (OUTPATIENT)
Dept: ORTHOPEDICS | Facility: CLINIC | Age: 44
End: 2019-03-09
Payer: COMMERCIAL

## 2019-03-09 VITALS
HEIGHT: 67 IN | WEIGHT: 247 LBS | SYSTOLIC BLOOD PRESSURE: 131 MMHG | HEART RATE: 82 BPM | DIASTOLIC BLOOD PRESSURE: 92 MMHG | BODY MASS INDEX: 38.77 KG/M2

## 2019-03-09 DIAGNOSIS — M25.531 RIGHT WRIST PAIN: ICD-10-CM

## 2019-03-09 DIAGNOSIS — M25.531 RIGHT WRIST PAIN: Primary | ICD-10-CM

## 2019-03-09 DIAGNOSIS — R20.0 NUMBNESS AND TINGLING IN RIGHT HAND: ICD-10-CM

## 2019-03-09 DIAGNOSIS — R20.2 NUMBNESS AND TINGLING IN RIGHT HAND: ICD-10-CM

## 2019-03-09 PROCEDURE — 73110 X-RAY EXAM OF WRIST: CPT | Mod: RT | Performed by: PEDIATRICS

## 2019-03-09 PROCEDURE — 99203 OFFICE O/P NEW LOW 30 MIN: CPT | Mod: 25 | Performed by: PEDIATRICS

## 2019-03-09 ASSESSMENT — MIFFLIN-ST. JEOR: SCORE: 1805.07

## 2019-03-09 NOTE — PROGRESS NOTES
Sports Medicine Clinic Visit    PCP: Elzbieta Pulido Aminata Lao is a 44 year old female who is seen  as a self referral presenting with right wrist pain. Right hand dominant    Injury: slipped on a hill and caught herself with her wrist  **  Hx tennis elbow ~8 years ago, did NTG patch.    Pain at first CMC joint, radial wrist. That happens from time to time.  After falling, noted wrist pain; that has improved somewhat.  More recently with ongoing use of right UE, notes small finger numbness.  Noted tenderness in dorsal wrist, near DRUJ, and with pressure there gets pain into forearm.  Mostly thumb and small finger that go numb. Nighttime wrist bracing helps with some numbness. However, with return to activity notes more pain.  Worse past couple weeks.      Location of Pain: right wrist to forearm pain along ulna  Duration of Pain: 2 month(s)  Rating of Pain at worst: 7/10  Rating of Pain Currently: 5/10  Symptoms are better with: Tylenol, bracing at night  Symptoms are worse with: movement, pronation, deviation  Additional Features:   Positive: instability, numbness and weakness   Negative: swelling, bruising, popping, grinding, catching, locking, paresthesias and pain in other joints  Other evaluation and/or treatments so far consists of: Ice, Rest, brace and Advil  Prior History of related problems: tennis elbow, overuse    Social History: dental hygienist     Review of Systems  Musculoskeletal: as above  Remainder of review of systems is negative including constitutional, CV, pulmonary, GI, Skin and Neurologic except as noted in HPI or medical history.    Past Medical History:   Diagnosis Date     Human papillomavirus in conditions classified elsewhere and of unspecified site     genital warts , resolved     Seasonal allergies     Zyrtec helps and has an Rx for Flonase     Past Surgical History:   Procedure Laterality Date      SECTION  2011    Procedure: SECTION;  Surgeon:CHRISSY BRITTON; Location:[facility]      SECTION  2014    Procedure:  SECTION;  Surgeon: Chrissy Britton MD;  [facility]     GYN SURGERY      tubal ligation     HC TOOTH EXTRACTION W/FORCEP      one dry socket     RW DENTAL (ABSTRACTED)       SURGICAL HISTORY OF -       lithotripsy     Family History   Problem Relation Age of Onset     C.A.D. Paternal Grandfather          of MI     Breast Cancer Maternal Grandmother      Cancer - colorectal Maternal Grandmother      Circulatory Maternal Grandmother         aneurism     Prostate Cancer Maternal Grandfather      Eye Surgery Maternal Grandfather         cataracts, late in life     Eye Disorder Father         detached retina     Genitourinary Problems Father         kidney stones     Macular Degeneration Father      Cerebrovascular Disease Paternal Grandmother      Glaucoma No family hx of      Social History     Socioeconomic History     Marital status:      Spouse name: Not on file     Number of children: Not on file     Years of education: Not on file     Highest education level: Not on file   Occupational History     Occupation: student     Employer: [employer]     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   Social Needs     Financial resource strain: Not on file     Food insecurity:     Worry: Not on file     Inability: Not on file     Transportation needs:     Medical: Not on file     Non-medical: Not on file   Tobacco Use     Smoking status: Former Smoker     Types: Cigarettes     Smokeless tobacco: Never Used   Substance and Sexual Activity     Alcohol use: No     Drug use: No     Sexual activity: Yes     Partners: Male     Birth control/protection: Surgical     Comment: mirena IUD 10/25/11   Lifestyle     Physical activity:     Days per week: Not on file     Minutes per session: Not on file     Stress: Not on file   Relationships     Social connections:     Talks on phone: Not on file     Gets together: Not on  "file     Attends Mu-ism service: Not on file     Active member of club or organization: Not on file     Attends meetings of clubs or organizations: Not on file     Relationship status: Not on file     Intimate partner violence:     Fear of current or ex partner: Not on file     Emotionally abused: Not on file     Physically abused: Not on file     Forced sexual activity: Not on file   Other Topics Concern     Parent/sibling w/ CABG, MI or angioplasty before 65F 55M? Not Asked   Social History Narrative    Caffeine intake/servings daily - 0    Calcium intake/servings daily - 3    Exercise 5 times weekly - describe gym    Sunscreen used - Yes    Seatbelts used - Yes    Guns stored in the home - No    Self Breast Exam - Yes    Pap test up to date -  Yes    Eye exam up to date -  Yes    Dental exam up to date -  Yes    DEXA scan up to date -  No    Flex Sig/Colonoscopy up to date -  No    Mammography up to date -  No    Immunizations reviewed and up to date - Yes    Abuse: Current or Past (Physical, Sexual or Emotional) - No    Do you feel safe in your environment - Yes    Do you cope well with stress - Yes    Do you suffer from insomnia - No    Last updated by: Radha Mendoza  12/18/2013                               Objective  BP (!) 131/92 (BP Location: Left arm, Patient Position: Chair, Cuff Size: Adult Large)   Pulse 82   Ht 1.689 m (5' 6.5\")   Wt 112 kg (247 lb)   BMI 39.27 kg/m       GENERAL APPEARANCE: healthy, alert and no distress   GAIT: NORMAL  SKIN: no suspicious lesions or rashes  NEURO: Normal strength and tone, mentation intact and speech normal  PSYCH:  mentation appears normal and affect normal/bright  HEENT: no scleral icterus  CV: no extremity edema  RESP: nonlabored breathing    Exam:    Elbow (right):  No edema, no ecchymosis noted.  Range of motion: flexion: full    extension: full    forearm supination: full    forearm pronation: full elbow at side; when elbow extended, mildly " limited  Strength testing: flexion:     extension:     forearm supination:     forearm pronation:    Pain with resisted rotation  Palpation: Tender medial elbow, cubital tunnel  Regional pulses normal. Capillary refill brisk.  Tinel positive cubital tunnel      Hand/wrist (right):  Inspection:  No deformity noted.  Question minimal ulnar wrist swelling. No ecchymosis.    Motion:  Wrist flexion   Wrist extension   Wrist radial deviation   Wrist ulnar deviation   Digit motion   Grossly intact      Radial pulses normal, +2/4, capillary refill brisk.    Palpation:  Tender first CMC joint, thenar eminence; over ulnar styloid; mild ulnar fovea      Tinel neg.    Finkelstein mild pain.  Thumb grind mild pain      Skin:       well perfused       capillary refill brisk    **  Spurling to left mild right elbow area pain; question mild right small finger numbness  Spurling right neg      Radiology:  Visualized radiographs of right wrist obtained today, and reviewed the images with the patient.  Impression: Three views of the right wrist demonstrate no acute osseous abnormality.      Assessment:  1. Right wrist pain    2. Numbness and tingling in right hand         Plan:  Discussed the assessment with the patient.  Plan below.  In addition to plan below, future considerations may be imaging of the wrist, additional support, electrodiagnostic testing, use of anti-inflammatory medication.  Follow up: 1 month if not improving with therapy, sooner if needed.  Questions answered. The patient indicates understanding of these issues and agrees with the plan.    Melvin Maurer DO, CAQ        Patient Instructions   Zaida to follow up with Primary Care provider regarding elevated blood pressure.    Findings consistent with some ulnar nerve irritation. For this, we discussed therapy and using a nighttime towel roll.    For the forearm and wrist, fits with overuse, though acute wrist injury is also a consideration.  May continue with  bracing for comfort. May use compression such as tubigrip as well.    Start with therapy, monitor course over the next 1 month.        Disclaimer: This note consists of symbols derived from keyboarding, dictation and/or voice recognition software. As a result, there may be errors in the script that have gone undetected. Please consider this when interpreting information found in this chart.

## 2019-03-09 NOTE — PATIENT INSTRUCTIONS
Zaida to follow up with Primary Care provider regarding elevated blood pressure.    Findings consistent with some ulnar nerve irritation. For this, we discussed therapy and using a nighttime towel roll.    For the forearm and wrist, fits with overuse, though acute wrist injury is also a consideration.  May continue with bracing for comfort. May use compression such as tubigrip as well.    Start with therapy, monitor course over the next 1 month.

## 2019-03-20 ENCOUNTER — TRANSFERRED RECORDS (OUTPATIENT)
Dept: HEALTH INFORMATION MANAGEMENT | Facility: CLINIC | Age: 44
End: 2019-03-20

## 2019-03-21 DIAGNOSIS — R09.02 OXYGEN DECREASE: Primary | ICD-10-CM

## 2019-03-29 ENCOUNTER — TRANSFERRED RECORDS (OUTPATIENT)
Dept: HEALTH INFORMATION MANAGEMENT | Facility: CLINIC | Age: 44
End: 2019-03-29

## 2019-03-31 ENCOUNTER — TRANSFERRED RECORDS (OUTPATIENT)
Dept: HEALTH INFORMATION MANAGEMENT | Facility: CLINIC | Age: 44
End: 2019-03-31

## 2019-04-05 ENCOUNTER — TRANSFERRED RECORDS (OUTPATIENT)
Dept: HEALTH INFORMATION MANAGEMENT | Facility: CLINIC | Age: 44
End: 2019-04-05

## 2019-05-10 NOTE — TELEPHONE ENCOUNTER
RECORDS RECEIVED FROM: External   DATE RECEIVED: 6.5.19   NOTES STATUS DETAILS   OFFICE NOTE from referring provider N/A    OFFICE NOTE from other specialist Care Everywhere 3.29.19 [place] [facility]   DISCHARGE SUMMARY from hospital N/A    DISCHARGE REPORT from the ER N/A    MEDICATION LIST Internal    IMAGING  (NEED IMAGES AND REPORTS)     CT SCAN N/A    CHEST XRAY (CXR) In process    TESTS     PULMONARY FUNCTION TESTING (PFT) In process Scheduled for       Action    Action Taken 5.10.19 Sent message to scheduling pool for cxr appt.

## 2019-06-05 ENCOUNTER — PRE VISIT (OUTPATIENT)
Dept: PULMONOLOGY | Facility: CLINIC | Age: 44
End: 2019-06-05

## 2019-06-11 ENCOUNTER — TELEPHONE (OUTPATIENT)
Dept: FAMILY MEDICINE | Facility: CLINIC | Age: 44
End: 2019-06-11

## 2019-06-11 NOTE — TELEPHONE ENCOUNTER
Patient had rabies vaccine done 8/16/18 at [facility]. Insurance is denying claim. Referral from PCP needs to be entered.

## 2019-06-11 NOTE — TELEPHONE ENCOUNTER
Yara Molina Heidi L   Caller: Unspecified (Today, 10:07 AM)             Patient has an open access plan. No insurance referral needed. Maybe they mean a pre cert? This would/should have been done by the provider who did the procedure.     Thank you,   Yara Molina   Referral Department   [phone]

## 2019-06-12 NOTE — TELEPHONE ENCOUNTER
Left message on Evangelina's voicemail (billing/med records at [facility]) to return call

## 2019-06-13 NOTE — TELEPHONE ENCOUNTER
I am unable to complete referral since it was almost a year ago. Per [place] policy we are not to do retro referrals unless it is continuation of care.     Thank you,   Yara Molina   Referral Department  [phone]

## 2019-06-13 NOTE — TELEPHONE ENCOUNTER
Evangelina returned call from [facility]. She has spoke with health [employer] and 3 separate facilities that vaccine was given at. [facility] is the only facility that was required to have information placed into health partners portal. However [facility] informed Evangelina that the ordering physician needs to put into portal. There was not a local physician on staff. Karyn feels she has exhausted her resources on the claim. Perhaps patient needs to step in

## 2019-06-20 NOTE — TELEPHONE ENCOUNTER
Evangelina returned call, patient had rabies vaccine # 1 on 8/8/18 at [facility]. Second vaccination was given on 08/11/18 in , third vaccination given on 08/15/18 in [place]. Patient was under impression that vaccine would be covered and that is why received it there as they were on vacation and didn't want to cancel vacation. Evangelina requested to speak with Referrals dept. Referrals dept number given

## 2019-06-20 NOTE — TELEPHONE ENCOUNTER
Evangelina returned call. Called back to Evangelina no answer left message on voicemail to return call

## 2019-10-27 DIAGNOSIS — F41.9 ANXIETY: ICD-10-CM

## 2019-10-27 NOTE — LETTER
October 29, 2019    Zaida Lao  [address] [facility] [place] [address]    Dear Zaida,       We recently received a refill request for escitalopram (LEXAPRO) 20 MG tablet.  We have refilled this for a one time 30 day supply only because you are due for a:    Physical and anxiety office visit      Please call at your earliest convenience so that there will not be a delay with your future refills.          Thank you,   Your [facility] Team/  [phone]

## 2019-10-28 RX ORDER — ESCITALOPRAM OXALATE 20 MG/1
TABLET ORAL
Qty: 30 TABLET | Refills: 0 | Status: SHIPPED | OUTPATIENT
Start: 2019-10-28 | End: 2019-11-21

## 2019-10-28 NOTE — TELEPHONE ENCOUNTER
Prescription approved per [facility] #30  APPT NEEDED FOR FURTHER REFILLS  Please help the pt schedule an appointment physical, anxiety.    Last office visit with WALTER Chiang.      Health Maintenance Due   Topic Date Due     PREVENTIVE CARE VISIT  06/30/2018     PHQ-2  01/01/2019     ROMANA ASSESSMENT  08/10/2019     INFLUENZA VACCINE (1) 09/01/2019     Reyna Johnston RN

## 2019-11-05 ENCOUNTER — HEALTH MAINTENANCE LETTER (OUTPATIENT)
Age: 44
End: 2019-11-05

## 2019-11-21 DIAGNOSIS — F41.9 ANXIETY: ICD-10-CM

## 2019-11-21 RX ORDER — ESCITALOPRAM OXALATE 20 MG/1
TABLET ORAL
Qty: 30 TABLET | Refills: 0 | Status: SHIPPED | OUTPATIENT
Start: 2019-11-21 | End: 2019-11-22

## 2019-11-21 NOTE — PROGRESS NOTES
Subjective     Zaida Lao is a 44 year old female who presents to clinic today for the following health issues:    History of Present Illness        Mental Health Follow-up:  Patient presents to follow-up on Anxiety.    Patient's anxiety since last visit has been:  Medium  The patient is having other symptoms associated with anxiety.  Any significant life events: relationship concerns and financial concerns  Patient is feeling anxious or having panic attacks.  Patient has no concerns about alcohol or drug use.     Social History  Tobacco Use    Smoking status: Former Smoker      Types: Cigarettes    Smokeless tobacco: Never Used  Alcohol use: No  Drug use: No      Today's PHQ-9         PHQ-9 Total Score:         PHQ-9 Q9 Thoughts of better off dead/self-harm past 2 weeks :       Thoughts of suicide or self harm:      Self-harm Plan:        Self-harm Action:          Safety concerns for self or others:         She feels good overall she is got a lot of stress in her life but it seems to be managing it.  She and her  are interested in couples therapy.  They did many years in the past and found helpful.  She is looking for referral.  Since last visit she has been diagnosed with sleep apnea and uses a CPAP machine and is working well.  Her main concern is she has a hard time shutting off her mind to sleep.  She states about half the days of the week she has trouble sleeping.  She also notes that she has a chronic history of nasal congestion and snoring.  She is interested in looking into this further.        Social History     Tobacco Use     Smoking status: Former Smoker     Types: Cigarettes     Smokeless tobacco: Never Used   Substance Use Topics     Alcohol use: No     Drug use: No     ROMANA-7 SCORE 11/25/2016 8/10/2018 11/22/2019   Total Score - - -   Total Score - - 10 (moderate anxiety)   Total Score 19 10 10   Total Score - - -     PHQ 8/10/2018   PHQ-9 Total Score 2   Q9: Thoughts of better off  dead/self-harm past 2 weeks Not at all     Patient Active Problem List   Diagnosis     Anxiety     CARDIOVASCULAR SCREENING; LDL GOAL LESS THAN 160     Seasonal allergies     Obesity     Kidney stones     Headache     Bilateral occipital neuralgia     Need for Tdap vaccination     Cough     Post-nasal drip     Nasal polyp     BMI 36.0-36.9,adult     LOC (obstructive sleep apnea)     Insomnia, unspecified type     Past Surgical History:   Procedure Laterality Date      SECTION  2011    Procedure: SECTION; Surgeon:CHRISSY BRITTON; Location:[facility] L[facility]      SECTION  2014    Procedure:  SECTION;  Surgeon: Chrissy Britton MD;  Location: [facility] L[facility]     GYN SURGERY      tubal ligation     HC TOOTH EXTRACTION W/FORCEP      one dry socket     RW DENTAL (ABSTRACTED)       SURGICAL HISTORY OF -       lithotripsy       Social History     Tobacco Use     Smoking status: Former Smoker     Types: Cigarettes     Smokeless tobacco: Never Used   Substance Use Topics     Alcohol use: No     Family History   Problem Relation Age of Onset     C.A.D. Paternal Grandfather          of MI     Breast Cancer Maternal Grandmother      Cancer - colorectal Maternal Grandmother      Circulatory Maternal Grandmother         aneurism     Prostate Cancer Maternal Grandfather      Eye Surgery Maternal Grandfather         cataracts, late in life     Eye Disorder Father         detached retina     Genitourinary Problems Father         kidney stones     Macular Degeneration Father      Cerebrovascular Disease Paternal Grandmother      Glaucoma No family hx of          Current Outpatient Medications   Medication Sig Dispense Refill     ALPRAZolam (XANAX) 0.25 MG tablet Take 1 tablet (0.25 mg) by mouth daily as needed for anxiety Do not mix with alcohol or drive after taking 30 tablet 0     escitalopram (LEXAPRO) 20 MG tablet Take 1 tablet (20 mg) by mouth daily 90 tablet 1     hydrOXYzine  "(ATARAX) 25 MG tablet Take 1-2 tablets (25-50 mg) by mouth nightly as needed for itching 30 tablet 1     fluticasone (FLONASE) 50 MCG/ACT spray Spray 1 spray into both nostrils daily       Allergies   Allergen Reactions     Seasonal Allergies      Cold symptoms, stuffy nose, itchy throat and eyes     Sulfa Drugs      not effective         Reviewed and updated as needed this visit by Provider  Tobacco  Allergies  Meds  Problems  Med Hx  Surg Hx  Fam Hx         Review of Systems   ROS COMP: Constitutional, HEENT, cardiovascular, pulmonary, gi and gu systems are negative, except as otherwise noted.      Objective    /84   Pulse 85   Temp 98.3  F (36.8  C) (Oral)   Ht 1.689 m (5' 6.5\")   Wt 109.3 kg (241 lb)   BMI 38.32 kg/m    Body mass index is 38.32 kg/m .  Physical Exam   GENERAL: healthy, alert and no distress  Head: Normocephalic, atraumatic.  Eyes: Conjunctiva clear, non icteric. PERRLA.  Ears: External ears and TMs normal BL.  Nose: Septum midline, nasal mucosa pink and moist. No discharge.  Mouth / Throat: Normal dentition.  No oral lesions. Pharynx non erythematous, tonsils without hypertrophy.  Neck: Supple, no enlarged LN, trachea midline.  Heart: S1 and S2 normal, no murmurs, clicks, gallops or rubs. Regular rate and rhythm.  Chest: Clear; no wheezes or rales.    Diagnostic Test Results:  Labs reviewed in Epic  none         Assessment & Plan       ICD-10-CM    1. Anxiety F41.9 escitalopram (LEXAPRO) 20 MG tablet     MENTAL HEALTH REFERRAL  - Adult; Outpatient Treatment; Individual/Couples/Family/Group Therapy/Health Psychology; [facility]: [facility] [phone]; We will contact you to schedule the appointment or please call with any questions     hydrOXYzine (ATARAX) 25 MG tablet     MENTAL HEALTH REFERRAL  - Adult; Outpatient Treatment; Sleep Psychology; [facility]: [facility] [phone]; We will contact you to schedule the appointment or please call with any " "questions   2. Insomnia, unspecified type G47.00 MENTAL HEALTH REFERRAL  - Adult; Outpatient Treatment; Individual/Couples/Family/Group Therapy/Health Psychology; [facility]: [facility] [phone]; We will contact you to schedule the appointment or please call with any questions     hydrOXYzine (ATARAX) 25 MG tablet     OTOLARYNGOLOGY REFERRAL     MENTAL HEALTH REFERRAL  - Adult; Outpatient Treatment; Sleep Psychology; [facility]: [facility] [phone]; We will contact you to schedule the appointment or please call with any questions   3. LOC (obstructive sleep apnea) G47.33    4. Nasal congestion R09.81 OTOLARYNGOLOGY REFERRAL   5. Need for prophylactic vaccination and inoculation against influenza Z23 INFLUENZA VACCINE IM > 6 MONTHS VALENT IIV4 [52029]     Vaccine Administration, Initial [27149]   1-2 . Stable. Will add hydroxyzine as needed. warning signs discussed. side effects discussed recheck 6 months or sooner if needed.   3. Stable on CPAP  4. Follow up  With ENT.      BMI:   Estimated body mass index is 38.32 kg/m  as calculated from the following:    Height as of this encounter: 1.689 m (5' 6.5\").    Weight as of this encounter: 109.3 kg (241 lb).   Weight management plan: Discussed healthy diet and exercise guidelines     Return in about 6 months (around 5/22/2020) for Anxiety Follow Up.    Juan Cortes PA-C  [facility] [place]      Answers for HPI/ROS submitted by the patient on 11/22/2019   Chronic problems general questions HPI Form  ROMANA 7 TOTAL SCORE: 10    "

## 2019-11-21 NOTE — TELEPHONE ENCOUNTER
Next 5 appointments (look out 90 days)    Nov 22, 2019 11:40 AM CST  SHORT with Juan Cortes [facility]-C  [facility] [address] [facility] [address] [phone]

## 2019-11-22 ENCOUNTER — OFFICE VISIT (OUTPATIENT)
Dept: FAMILY MEDICINE | Facility: CLINIC | Age: 44
End: 2019-11-22
Payer: COMMERCIAL

## 2019-11-22 VITALS
DIASTOLIC BLOOD PRESSURE: 84 MMHG | BODY MASS INDEX: 37.83 KG/M2 | WEIGHT: 241 LBS | HEIGHT: 67 IN | TEMPERATURE: 98.3 F | SYSTOLIC BLOOD PRESSURE: 126 MMHG | HEART RATE: 85 BPM

## 2019-11-22 DIAGNOSIS — F41.9 ANXIETY: Primary | ICD-10-CM

## 2019-11-22 DIAGNOSIS — R09.81 NASAL CONGESTION: ICD-10-CM

## 2019-11-22 DIAGNOSIS — G47.00 INSOMNIA, UNSPECIFIED TYPE: ICD-10-CM

## 2019-11-22 DIAGNOSIS — G47.33 OSA (OBSTRUCTIVE SLEEP APNEA): ICD-10-CM

## 2019-11-22 DIAGNOSIS — Z23 NEED FOR PROPHYLACTIC VACCINATION AND INOCULATION AGAINST INFLUENZA: ICD-10-CM

## 2019-11-22 PROCEDURE — 90471 IMMUNIZATION ADMIN: CPT | Performed by: PHYSICIAN ASSISTANT

## 2019-11-22 PROCEDURE — 90686 IIV4 VACC NO PRSV 0.5 ML IM: CPT | Performed by: PHYSICIAN ASSISTANT

## 2019-11-22 PROCEDURE — 99214 OFFICE O/P EST MOD 30 MIN: CPT | Mod: 25 | Performed by: PHYSICIAN ASSISTANT

## 2019-11-22 RX ORDER — HYDROXYZINE HYDROCHLORIDE 25 MG/1
25-50 TABLET, FILM COATED ORAL
Qty: 30 TABLET | Refills: 1 | Status: SHIPPED | OUTPATIENT
Start: 2019-11-22 | End: 2020-11-13

## 2019-11-22 RX ORDER — ESCITALOPRAM OXALATE 20 MG/1
20 TABLET ORAL DAILY
Qty: 90 TABLET | Refills: 1 | Status: SHIPPED | OUTPATIENT
Start: 2019-11-22 | End: 2020-05-14

## 2019-11-22 ASSESSMENT — ANXIETY QUESTIONNAIRES
GAD7 TOTAL SCORE: 10
6. BECOMING EASILY ANNOYED OR IRRITABLE: MORE THAN HALF THE DAYS
3. WORRYING TOO MUCH ABOUT DIFFERENT THINGS: SEVERAL DAYS
GAD7 TOTAL SCORE: 10
GAD7 TOTAL SCORE: 10
1. FEELING NERVOUS, ANXIOUS, OR ON EDGE: NEARLY EVERY DAY
4. TROUBLE RELAXING: MORE THAN HALF THE DAYS
5. BEING SO RESTLESS THAT IT IS HARD TO SIT STILL: NOT AT ALL
7. FEELING AFRAID AS IF SOMETHING AWFUL MIGHT HAPPEN: SEVERAL DAYS
7. FEELING AFRAID AS IF SOMETHING AWFUL MIGHT HAPPEN: SEVERAL DAYS
2. NOT BEING ABLE TO STOP OR CONTROL WORRYING: SEVERAL DAYS

## 2019-11-22 ASSESSMENT — MIFFLIN-ST. JEOR: SCORE: 1777.86

## 2019-11-23 ASSESSMENT — ANXIETY QUESTIONNAIRES: GAD7 TOTAL SCORE: 10

## 2019-12-06 ENCOUNTER — OFFICE VISIT (OUTPATIENT)
Dept: OTOLARYNGOLOGY | Facility: CLINIC | Age: 44
End: 2019-12-06
Payer: COMMERCIAL

## 2019-12-06 VITALS — OXYGEN SATURATION: 97 % | HEART RATE: 98 BPM | BODY MASS INDEX: 37.83 KG/M2 | HEIGHT: 67 IN | WEIGHT: 241 LBS

## 2019-12-06 DIAGNOSIS — J35.2 ADENOID HYPERTROPHY: ICD-10-CM

## 2019-12-06 DIAGNOSIS — J34.2 NASAL SEPTAL DEVIATION: ICD-10-CM

## 2019-12-06 DIAGNOSIS — J35.01 CHRONIC TONSILLITIS: ICD-10-CM

## 2019-12-06 DIAGNOSIS — J34.89 NASAL OBSTRUCTION: Primary | ICD-10-CM

## 2019-12-06 DIAGNOSIS — J34.3 NASAL TURBINATE HYPERTROPHY: ICD-10-CM

## 2019-12-06 PROCEDURE — 99204 OFFICE O/P NEW MOD 45 MIN: CPT | Mod: 25 | Performed by: OTOLARYNGOLOGY

## 2019-12-06 PROCEDURE — 31231 NASAL ENDOSCOPY DX: CPT | Performed by: OTOLARYNGOLOGY

## 2019-12-06 ASSESSMENT — MIFFLIN-ST. JEOR: SCORE: 1777.86

## 2019-12-06 NOTE — LETTER
12/6/2019         RE: Zaida Lao  [address]   [place] [address]        Dear Colleague,    Thank you for referring your patient, Zaida Lao, to the [facility]. Please see a copy of my visit note below.    I am seeing this patient in consultation for nasal congestion, insomnia at the request of the provider Jaun Cortes.    Chief Complaint - tonsillitis; nasal obstruction    History of Present Illness - Zaida Lao is a 44 year old female with enlarged tonsils. She has tonsil stones. These bother her a lot. She does feel postnasal drainage. She has some sore throats. Also noted is snoring. No personal or family history of bleeding disorders. She had a sleep study done that showed LOC with low oxygen. She was given a CPAP. Dad and brother have LOC. She has nasal obstruction. She mouth breaths. This has been going on for years. Nonsmoker.     Past Medical History -   Patient Active Problem List   Diagnosis     Anxiety     CARDIOVASCULAR SCREENING; LDL GOAL LESS THAN 160     Seasonal allergies     Obesity     Kidney stones     Headache     Bilateral occipital neuralgia     Need for Tdap vaccination     Cough     Post-nasal drip     Nasal polyp     BMI 36.0-36.9,adult     LOC (obstructive sleep apnea)     Insomnia, unspecified type       Current Medications -   Current Outpatient Medications:      ALPRAZolam (XANAX) 0.25 MG tablet, Take 1 tablet (0.25 mg) by mouth daily as needed for anxiety Do not mix with alcohol or drive after taking, Disp: 30 tablet, Rfl: 0     escitalopram (LEXAPRO) 20 MG tablet, Take 1 tablet (20 mg) by mouth daily, Disp: 90 tablet, Rfl: 1     fluticasone (FLONASE) 50 MCG/ACT spray, Spray 1 spray into both nostrils daily, Disp: , Rfl:      hydrOXYzine (ATARAX) 25 MG tablet, Take 1-2 tablets (25-50 mg) by mouth nightly as needed for itching, Disp: 30 tablet, Rfl: 1    Allergies -   Allergies   Allergen Reactions     Seasonal Allergies       Cold symptoms, stuffy nose, itchy throat and eyes     Sulfa Drugs      not effective       Social History -   Social History     Socioeconomic History     Marital status:      Spouse name: None     Number of children: None     Years of education: None     Highest education level: None   Occupational History     Occupation: student     Employer: [employer]     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   Social Needs     Financial resource strain: None     Food insecurity:     Worry: None     Inability: None     Transportation needs:     Medical: None     Non-medical: None   Tobacco Use     Smoking status: Former Smoker     Types: Cigarettes     Smokeless tobacco: Never Used   Substance and Sexual Activity     Alcohol use: No     Drug use: No     Sexual activity: Yes     Partners: Male     Birth control/protection: Surgical     Comment: mirena IUD 10/25/11   Lifestyle     Physical activity:     Days per week: None     Minutes per session: None     Stress: None   Relationships     Social connections:     Talks on phone: None     Gets together: None     Attends Hinduism service: None     Active member of club or organization: None     Attends meetings of clubs or organizations: None     Relationship status: None     Intimate partner violence:     Fear of current or ex partner: None     Emotionally abused: None     Physically abused: None     Forced sexual activity: None   Other Topics Concern     Parent/sibling w/ CABG, MI or angioplasty before 65F 55M? Not Asked   Social History Narrative    Caffeine intake/servings daily - 0    Calcium intake/servings daily - 3    Exercise 5 times weekly - describe gym    Sunscreen used - Yes    Seatbelts used - Yes    Guns stored in the home - No    Self Breast Exam - Yes    Pap test up to date -  Yes    Eye exam up to date -  Yes    Dental exam up to date -  Yes    DEXA scan up to date -  No    Flex Sig/Colonoscopy up to date -  No    Mammography up to date -  No    Immunizations  "reviewed and up to date - Yes    Abuse: Current or Past (Physical, Sexual or Emotional) - No    Do you feel safe in your environment - Yes    Do you cope well with stress - Yes    Do you suffer from insomnia - No    Last updated by: Radha Mendoza  2013                               Family History -   Family History   Problem Relation Age of Onset     C.A.D. Paternal Grandfather          of MI     Breast Cancer Maternal Grandmother      Cancer - colorectal Maternal Grandmother      Circulatory Maternal Grandmother         aneurism     Prostate Cancer Maternal Grandfather      Eye Surgery Maternal Grandfather         cataracts, late in life     Eye Disorder Father         detached retina     Genitourinary Problems Father         kidney stones     Macular Degeneration Father      Cerebrovascular Disease Paternal Grandmother      Glaucoma No family hx of        Review of Systems - As per HPI and PMHx, otherwise 10+ comprehensive system review is negative.    Physical Exam  Pulse 98   Ht 1.689 m (5' 6.5\")   Wt 109.3 kg (241 lb)   SpO2 97%   BMI 38.32 kg/m     General - The patient is in no distress.  Alert and oriented, answers questions and cooperates with examination appropriately.   Voice and Breathing - The patient was breathing comfortably without the use of accessory muscles. There was no wheezing, stridor, or stertor.  The patients voice was clear and strong.  Eyes - Extraocular movements intact. Sclera were not icteric or injected, conjunctiva were pink and moist.  Neurologic - Cranial nerves II-XII are grossly intact. Specifically, the facial nerve is intact, House-Brackmann grade 1 of 6.   Nose - No significant external deformity.  Nasal mucosa is pink and moist with no abnormal mucus.  The septum was deviated to the left.  Turbinates hypertrophic.  She has some thick mucus posteriorly on the right.  I suctioned this.  Mouth - Examination of the oral cavity showed pink, healthy oral " mucosa. No lesions or ulcerations noted.  The tongue was mobile and protrudes midline.  Oropharynx - The walls of the oropharynx were smooth, pink, moist, symmetric, and had no lesions or ulcerations.  The tonsils were 1+, very cryptic. The uvula was midline and the palate raised symmetrically.   Neck -  Soft, non-tender, palpation of the occipital, submental, submandibular, internal jugular chain, and supraclavicular nodes did not demonstrate any abnormal lymph nodes or masses. The parotid glands were without masses. Palpation of the thyroid was soft and smooth, with no nodules or goiter appreciated.  The trachea was midline.  Cardiovascular - carotid pulses are 2+ bilaterally, regular rhythm    Nasal endoscopy - To further evaluate the nasal cavity, I performed flexible nasal endoscopy, and color photographs were taken for the permanent medical record.  I first sprayed both sides of the nasal cavity with a mix of lidocaine and neosynephrine.  I then began on the right side using an adult flexible fiberoptic endoscope.  The septum was deviated to the left. The turbinate was hypertrophic, more posteriorly. Some white secretions posteriorly.  No purulence or polyps were noted. The right middle turbinate and middle meatus were clearly visualized and normal in appearance. Going further back, the sphenoethmoid recess had thicker mucus and there is a very large adenoid pad protruding into the posterior nasal cavity.  I did pass the scope underneath this and looked in the oropharynx which was normal the supraglottic structures and vocal cords were normal.    I then turned my attention to the left side.  Once again, the septum was very deviated to the left and there was no airway.  I could not slide the scope inferior or superior to this to be able to pass it into the posterior nasal cavity.  No abnormal secretions, purulence, polyps were noted.     A/P - Zaida Lao is a 44 year old female with chronic  tonsillitis, nasal obstruction, tonsil and adenoid hypertrophy; septal deviation; turbinate hypertrophy. I recommend adenotonsillectomy; septoplasty, and bilateral inferior turbinate reduction. The remainder of the visit was spent discussing the procedure.    I explained the risks, benefits, and alternatives of the procedure including, but not, limited to: bleeding, possible need to go back to the OR to control bleeding, blood transfusion, pain, infection, persistent nasal obstruction, the need for medications. I also discussed the risks of general anesthesia. I also explained the likely need for narcotic (opioid) pain medication that increases the risk of dependency. The patient will need to wean off the medication as soon as possible, and Tylenol and ibuprofen medication are preferred. They agree and wish to proceed. The surgical schedulers will call the patient.     Jb Nava MD  Otolaryngology  [facility]            Again, thank you for allowing me to participate in the care of your patient.        Sincerely,        Jb Nava MD

## 2019-12-06 NOTE — PATIENT INSTRUCTIONS
General Scheduling Information  To schedule your CT/MRI scan, please contact [place] [facility] at [phone] [address] [place] [address]    To schedule your Surgery, please contact our Specialty Schedulers at [phone]    ENT Clinic Locations Clinic Hours Telephone Number     [facility]  [address] [place] [address]   Tuesday:       8:00am -- 4:00pm    Wednesday:  8:00am - 4:00pm   To schedule an appointment with   Dr. Nava,   please contact our   Specialty Scheduling Department at:     [phone]       [facility]  [address] [place] [address]   Friday:          8:00am - 4:00pm         Urgent Care Locations Clinic Hours Telephone Numbers     [facility]loan  [address] [place] [address]     Monday-Friday:     11:00pm - 9:00pm    Saturday-Sunday:  9:00am - 5:00pm   [phone]     [facility]  [address] [place] [address]     Monday-Friday:      5:00pm - 9:00pm     Saturday-Sunday:  9:00am - 5:00pm   [phone]

## 2019-12-06 NOTE — PROGRESS NOTES
I am seeing this patient in consultation for nasal congestion, insomnia at the request of the provider Juan Cortes.    Chief Complaint - tonsillitis; nasal obstruction    History of Present Illness - Zaida Lao is a 44 year old female with enlarged tonsils. She has tonsil stones. These bother her a lot. She does feel postnasal drainage. She has some sore throats. Also noted is snoring. No personal or family history of bleeding disorders. She had a sleep study done that showed LOC with low oxygen. She was given a CPAP. Dad and brother have LOC. She has nasal obstruction. She mouth breaths. This has been going on for years. Nonsmoker.     Past Medical History -   Patient Active Problem List   Diagnosis     Anxiety     CARDIOVASCULAR SCREENING; LDL GOAL LESS THAN 160     Seasonal allergies     Obesity     Kidney stones     Headache     Bilateral occipital neuralgia     Need for Tdap vaccination     Cough     Post-nasal drip     Nasal polyp     BMI 36.0-36.9,adult     LOC (obstructive sleep apnea)     Insomnia, unspecified type       Current Medications -   Current Outpatient Medications:      ALPRAZolam (XANAX) 0.25 MG tablet, Take 1 tablet (0.25 mg) by mouth daily as needed for anxiety Do not mix with alcohol or drive after taking, Disp: 30 tablet, Rfl: 0     escitalopram (LEXAPRO) 20 MG tablet, Take 1 tablet (20 mg) by mouth daily, Disp: 90 tablet, Rfl: 1     fluticasone (FLONASE) 50 MCG/ACT spray, Spray 1 spray into both nostrils daily, Disp: , Rfl:      hydrOXYzine (ATARAX) 25 MG tablet, Take 1-2 tablets (25-50 mg) by mouth nightly as needed for itching, Disp: 30 tablet, Rfl: 1    Allergies -   Allergies   Allergen Reactions     Seasonal Allergies      Cold symptoms, stuffy nose, itchy throat and eyes     Sulfa Drugs      not effective       Social History -   Social History     Socioeconomic History     Marital status:      Spouse name: None     Number of children: None     Years of education:  None     Highest education level: None   Occupational History     Occupation: student     Employer: [employer]     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   Social Needs     Financial resource strain: None     Food insecurity:     Worry: None     Inability: None     Transportation needs:     Medical: None     Non-medical: None   Tobacco Use     Smoking status: Former Smoker     Types: Cigarettes     Smokeless tobacco: Never Used   Substance and Sexual Activity     Alcohol use: No     Drug use: No     Sexual activity: Yes     Partners: Male     Birth control/protection: Surgical     Comment: mirena IUD 10/25/11   Lifestyle     Physical activity:     Days per week: None     Minutes per session: None     Stress: None   Relationships     Social connections:     Talks on phone: None     Gets together: None     Attends Episcopal service: None     Active member of club or organization: None     Attends meetings of clubs or organizations: None     Relationship status: None     Intimate partner violence:     Fear of current or ex partner: None     Emotionally abused: None     Physically abused: None     Forced sexual activity: None   Other Topics Concern     Parent/sibling w/ CABG, MI or angioplasty before 65F 55M? Not Asked   Social History Narrative    Caffeine intake/servings daily - 0    Calcium intake/servings daily - 3    Exercise 5 times weekly - describe gym    Sunscreen used - Yes    Seatbelts used - Yes    Guns stored in the home - No    Self Breast Exam - Yes    Pap test up to date -  Yes    Eye exam up to date -  Yes    Dental exam up to date -  Yes    DEXA scan up to date -  No    Flex Sig/Colonoscopy up to date -  No    Mammography up to date -  No    Immunizations reviewed and up to date - Yes    Abuse: Current or Past (Physical, Sexual or Emotional) - No    Do you feel safe in your environment - Yes    Do you cope well with stress - Yes    Do you suffer from insomnia - No    Last updated by: Radha Mendoza   "2013                               Family History -   Family History   Problem Relation Age of Onset     C.A.D. Paternal Grandfather          of MI     Breast Cancer Maternal Grandmother      Cancer - colorectal Maternal Grandmother      Circulatory Maternal Grandmother         aneurism     Prostate Cancer Maternal Grandfather      Eye Surgery Maternal Grandfather         cataracts, late in life     Eye Disorder Father         detached retina     Genitourinary Problems Father         kidney stones     Macular Degeneration Father      Cerebrovascular Disease Paternal Grandmother      Glaucoma No family hx of        Review of Systems - As per HPI and PMHx, otherwise 10+ comprehensive system review is negative.    Physical Exam  Pulse 98   Ht 1.689 m (5' 6.5\")   Wt 109.3 kg (241 lb)   SpO2 97%   BMI 38.32 kg/m    General - The patient is in no distress.  Alert and oriented, answers questions and cooperates with examination appropriately.   Voice and Breathing - The patient was breathing comfortably without the use of accessory muscles. There was no wheezing, stridor, or stertor.  The patients voice was clear and strong.  Eyes - Extraocular movements intact. Sclera were not icteric or injected, conjunctiva were pink and moist.  Neurologic - Cranial nerves II-XII are grossly intact. Specifically, the facial nerve is intact, House-Brackmann grade 1 of 6.   Nose - No significant external deformity.  Nasal mucosa is pink and moist with no abnormal mucus.  The septum was deviated to the left.  Turbinates hypertrophic.  She has some thick mucus posteriorly on the right.  I suctioned this.  Mouth - Examination of the oral cavity showed pink, healthy oral mucosa. No lesions or ulcerations noted.  The tongue was mobile and protrudes midline.  Oropharynx - The walls of the oropharynx were smooth, pink, moist, symmetric, and had no lesions or ulcerations.  The tonsils were 1+, very cryptic. The uvula was midline and " the palate raised symmetrically.   Neck -  Soft, non-tender, palpation of the occipital, submental, submandibular, internal jugular chain, and supraclavicular nodes did not demonstrate any abnormal lymph nodes or masses. The parotid glands were without masses. Palpation of the thyroid was soft and smooth, with no nodules or goiter appreciated.  The trachea was midline.  Cardiovascular - carotid pulses are 2+ bilaterally, regular rhythm    Nasal endoscopy - To further evaluate the nasal cavity, I performed flexible nasal endoscopy, and color photographs were taken for the permanent medical record.  I first sprayed both sides of the nasal cavity with a mix of lidocaine and neosynephrine.  I then began on the right side using an adult flexible fiberoptic endoscope.  The septum was deviated to the left. The turbinate was hypertrophic, more posteriorly. Some white secretions posteriorly.  No purulence or polyps were noted. The right middle turbinate and middle meatus were clearly visualized and normal in appearance. Going further back, the sphenoethmoid recess had thicker mucus and there is a very large adenoid pad protruding into the posterior nasal cavity.  I did pass the scope underneath this and looked in the oropharynx which was normal the supraglottic structures and vocal cords were normal.    I then turned my attention to the left side.  Once again, the septum was very deviated to the left and there was no airway.  I could not slide the scope inferior or superior to this to be able to pass it into the posterior nasal cavity.  No abnormal secretions, purulence, polyps were noted.     A/P - Zaida Lao is a 44 year old female with chronic tonsillitis, nasal obstruction, tonsil and adenoid hypertrophy; septal deviation; turbinate hypertrophy. I recommend adenotonsillectomy; septoplasty, and bilateral inferior turbinate reduction. The remainder of the visit was spent discussing the procedure.    I explained  the risks, benefits, and alternatives of the procedure including, but not, limited to: bleeding, possible need to go back to the OR to control bleeding, blood transfusion, pain, infection, persistent nasal obstruction, the need for medications. I also discussed the risks of general anesthesia. I also explained the likely need for narcotic (opioid) pain medication that increases the risk of dependency. The patient will need to wean off the medication as soon as possible, and Tylenol and ibuprofen medication are preferred. They agree and wish to proceed. The surgical schedulers will call the patient.     Jb Nava MD  Otolaryngology  [facility]

## 2019-12-09 ENCOUNTER — TELEPHONE (OUTPATIENT)
Dept: OTOLARYNGOLOGY | Facility: CLINIC | Age: 44
End: 2019-12-09

## 2019-12-09 PROBLEM — J35.01 CHRONIC TONSILLITIS: Status: ACTIVE | Noted: 2019-12-09

## 2019-12-09 PROBLEM — J34.89 NASAL OBSTRUCTION: Status: ACTIVE | Noted: 2019-12-09

## 2019-12-09 PROBLEM — J34.2 NASAL SEPTAL DEVIATION: Status: ACTIVE | Noted: 2019-12-09

## 2019-12-09 PROBLEM — J34.3 NASAL TURBINATE HYPERTROPHY: Status: ACTIVE | Noted: 2019-12-09

## 2019-12-09 PROBLEM — J35.2 ADENOID HYPERTROPHY: Status: ACTIVE | Noted: 2019-12-09

## 2019-12-09 NOTE — TELEPHONE ENCOUNTER
Type of surgery: septoplasty,bilateral inferior turbinate  reduction;tonsillectomy;adenoidectomy (bilateral) CPT code 11177, 29382, 85930  Nasal obstruction [J34.89]  - Primary       Nasal septal deviation [J34.2]       Nasal turbinate hypertrophy [J34.3]       Adenoid hypertrophy [J35.2]       Chronic tonsillitis [J35.01]       Location of surgery: [facility]  Date and time of surgery: 3-9-20  TBD  Surgeon: Dr Nava  Pre-Op Appt Date: 2-21-20  Post-Op Appt Date: 3-17-20 & 4-3-20   Packet sent out: Yes  Pre-cert/Authorization completed:  No prior auth required per [facility] site.   Date: 12/10/2019    Insurance same in 2020    Insurance valid 03/02/2020

## 2019-12-13 ENCOUNTER — ANCILLARY PROCEDURE (OUTPATIENT)
Dept: MAMMOGRAPHY | Facility: CLINIC | Age: 44
End: 2019-12-13
Payer: COMMERCIAL

## 2019-12-13 DIAGNOSIS — Z12.31 VISIT FOR SCREENING MAMMOGRAM: ICD-10-CM

## 2019-12-13 PROCEDURE — 77063 BREAST TOMOSYNTHESIS BI: CPT | Mod: TC

## 2019-12-13 PROCEDURE — 77067 SCR MAMMO BI INCL CAD: CPT | Mod: TC

## 2019-12-27 ENCOUNTER — ANCILLARY PROCEDURE (OUTPATIENT)
Dept: ULTRASOUND IMAGING | Facility: CLINIC | Age: 44
End: 2019-12-27
Attending: PHYSICIAN ASSISTANT
Payer: COMMERCIAL

## 2019-12-27 ENCOUNTER — ANCILLARY PROCEDURE (OUTPATIENT)
Dept: MAMMOGRAPHY | Facility: CLINIC | Age: 44
End: 2019-12-27
Attending: PHYSICIAN ASSISTANT
Payer: COMMERCIAL

## 2019-12-27 DIAGNOSIS — R92.8 ABNORMAL MAMMOGRAM: ICD-10-CM

## 2019-12-27 PROCEDURE — G0279 TOMOSYNTHESIS, MAMMO: HCPCS | Performed by: RADIOLOGY

## 2019-12-27 PROCEDURE — 77065 DX MAMMO INCL CAD UNI: CPT | Performed by: RADIOLOGY

## 2019-12-27 PROCEDURE — 76642 ULTRASOUND BREAST LIMITED: CPT | Mod: RT | Performed by: RADIOLOGY

## 2020-01-03 ENCOUNTER — OFFICE VISIT (OUTPATIENT)
Dept: PSYCHOLOGY | Facility: CLINIC | Age: 45
End: 2020-01-03
Attending: PHYSICIAN ASSISTANT
Payer: COMMERCIAL

## 2020-01-03 DIAGNOSIS — F41.1 GENERALIZED ANXIETY DISORDER: Primary | ICD-10-CM

## 2020-01-03 PROCEDURE — 90791 PSYCH DIAGNOSTIC EVALUATION: CPT | Performed by: MARRIAGE & FAMILY THERAPIST

## 2020-01-03 ASSESSMENT — COLUMBIA-SUICIDE SEVERITY RATING SCALE - C-SSRS
TOTAL  NUMBER OF INTERRUPTED ATTEMPTS LIFETIME: NO
ATTEMPT PAST THREE MONTHS: NO
2. HAVE YOU ACTUALLY HAD ANY THOUGHTS OF KILLING YOURSELF LIFETIME?: NO
TOTAL  NUMBER OF INTERRUPTED ATTEMPTS PAST 3 MONTHS: NO
1. IN THE PAST MONTH, HAVE YOU WISHED YOU WERE DEAD OR WISHED YOU COULD GO TO SLEEP AND NOT WAKE UP?: NO
ATTEMPT LIFETIME: NO
2. HAVE YOU ACTUALLY HAD ANY THOUGHTS OF KILLING YOURSELF?: NO
6. HAVE YOU EVER DONE ANYTHING, STARTED TO DO ANYTHING, OR PREPARED TO DO ANYTHING TO END YOUR LIFE?: NO
TOTAL  NUMBER OF ABORTED OR SELF INTERRUPTED ATTEMPTS PAST LIFETIME: NO
1. IN THE PAST MONTH, HAVE YOU WISHED YOU WERE DEAD OR WISHED YOU COULD GO TO SLEEP AND NOT WAKE UP?: NO
TOTAL  NUMBER OF ABORTED OR SELF INTERRUPTED ATTEMPTS PAST 3 MONTHS: NO
6. HAVE YOU EVER DONE ANYTHING, STARTED TO DO ANYTHING, OR PREPARED TO DO ANYTHING TO END YOUR LIFE?: NO

## 2020-01-03 NOTE — Clinical Note
Juan Serrano, and Vita.  I met with Zaida for Diagnostic Assessment/therapy intake.  Plan for therapy will be to focus on increasing calm mindset and improved interpersonal functioning/relationship.  Thank you for the referral--looking forward to working further with Zaida!

## 2020-01-03 NOTE — PROGRESS NOTES
"                                                                                                                                                                      Adult Intake Structured Interview  Standard Diagnostic Assessment      CLIENT'S NAME: Zaida Lao  MRN:   [id number]  :   1975  ACCT. NUMBER: [id number]  DATE OF SERVICE: 20  VIDEO VISIT: No    Identifying Information:  Client is a 44 year old, ,  female. Client was referred for counseling by [facility] and primary care provider. Client is currently employed full time and reports is able to function appropriately at work.. Client attended the session with her spouse.      Client's Statement of Presenting Concern:  Client reports the reason for seeking therapy at this time as \"anxiety--relationship/family concerns.\"  Client stated that her symptoms have resulted in the following functional impairments: home life with spouse and relationship(s)      History of Presenting Concern:  Client reports that these problem(s) began in April/May 2019 corresponding with significant financial stress. Client has attempted to resolve these concerns in the past through \"talking with , taking meds, regrouping finances--I believe it has helped but we could use some professional intervention.\" Client reports that other professional(s) are involved in providing support / services. Client reported that she is prescribed psychotropic medications by her PCP.      Social History:  Client reported she grew up in [place], [place]. She was the first born of three children. This is an intact family and parents remain . Client reported that her childhood was \"fun, loving, involved family activities, Mom stayed at home, Dad worked--upper middle class.\" Client described her relationships with her current family as \"mostly good--general and daily stressors.\"    Client reported a history of three committed " relationships or marriages. Client has been  for 8 years. Client reported having two children, an 8 year-old son and a 5 year-old daughter. Client identified some stable and meaningful social connections. Client reported that she has not been involved with the legal system.  Client's highest education level was college graduate. Client did not identify any learning problems. There are no ethnic, cultural or Congregation factors that may be relevant for therapy. Client identified her preferred language to be English. Client reported she does not need the assistance of an  or other support involved in therapy. Modifications will not be used to assist communication in therapy. Client did not serve in the .    Client reports family history includes Breast Cancer in her maternal grandmother; C.A.D. in her paternal grandfather; Cancer - colorectal in her maternal grandmother; Cerebrovascular Disease in her paternal grandmother; Circulatory in her maternal grandmother; Eye Disorder in her father; Eye Surgery in her maternal grandfather; Genitourinary Problems in her father; Macular Degeneration in her father; Prostate Cancer in her maternal grandfather.    Mental Health History:  Client reported the following biological family members or relatives with mental health issues: Father experienced Anxiety.  Client previously received the following mental health diagnosis: Anxiety.  Client has received the following mental health services in the past: medication(s) from physician / PCP.  Hospitalizations: None.  Client is currently receiving the following services: medication(s) from physician / PCP.    Chemical Health History:  Client reported no family history of chemical health issues. Client has not received chemical dependency treatment in the past. Client is not currently receiving any chemical dependency treatment. Client reported the following problems as a result of drug use: family problems and  "relationship problems.    Client Reports:  Client reports using alcohol 2-3 times per month and has \"social\" amount at a time. Patient first started drinking at age 20.  Patient reported date of last use was last week.  Patient reports heaviest use was college.  Client denies using tobacco.  Client denies using marijuana.  Client reports using caffeine 1 times per day and drinks \"12 ounces\" at a time. Patient started using caffeine at age 43.  Client denies using street drugs.  Client denies the non-medical use of prescription or over the counter drugs.    CAGE: A     Patient felt ANNOYED by people criticizing their drinking (or drug use).  G     Patient felt bad or GUILTY about their drinking (or drug use).   Based on the positive Cage-Aid score and clinical interview there  are not indications of drug or alcohol abuse.    Discussed the general effects of drugs and alcohol on health and well-being.    Significant Losses / Trauma / Abuse / Neglect Issues:  There are indications or report of significant loss, trauma, abuse or neglect issues related to: death of \"all grandparents\".    Issues of possible neglect are not present.      Medical Issues:  Client has had a physical exam to rule out medical causes for current symptoms. Date of last physical exam was within the past year. Client was encouraged to follow up with PCP if symptoms were to develop. The client has a [facility] Provider, who is named Elzbieta Pulido.. The client reports not having a psychiatrist. Client reports no current medical concerns. The client denies the presence of chronic or episodic pain. There are significant nutritional concerns. Client reported \"overweight.\"    Patient reports current meds as:   No outpatient medications have been marked as taking for the 1/3/20 encounter (Office Visit) with Nick Mcgee LMFT.       Client Allergies:  Allergies   Allergen Reactions     Seasonal Allergies      Cold symptoms, " stuffy nose, itchy throat and eyes     Sulfa Drugs      not effective     no allergies to medications    Medical History:  Past Medical History:   Diagnosis Date     Human papillomavirus in conditions classified elsewhere and of unspecified site     genital warts 1996, resolved     Seasonal allergies     Zyrtec helps and has an Rx for Flonase         Medication Adherence:  Client reports taking prescribed medications as prescribed.    Client was provided recommendation to follow-up with prescribing physician.    Mental Status Assessment:  Appearance:   Appropriate   Eye Contact:   Good   Psychomotor Behavior: Normal   Attitude:   Cooperative   Orientation:   All  Speech   Rate / Production: Normal    Volume:  Loud   Mood:    Anxious  Elevated  Normal  Affect:    Appropriate  Expansive   Thought Content:  Clear   Thought Form:  Coherent  Logical   Insight:    Fair       Review of Symptoms:  Depression: Sleep Guilt Energy Appetite Hopeless Irritability  Candace:  No symptoms  Psychosis: No symptoms  Anxiety: Worries Nervousness Usual  Panic:  Sense of Impending Doom  Post Traumatic Stress Disorder: No symptoms  Obsessive Compulsive Disorder: No symptoms  Eating Disorder: No symptoms  Oppositional Defiant Disorder: No symptoms  ADD / ADHD: No symptoms  Conduct Disorder: No symptoms      Safety Assessment:    History of Safety Concerns:   Client denied a history of suicidal ideation.    Client denied a history of suicide attempts.    Client denied a history of homicidal ideation.    Client denied a history of self-injurious ideation and behaviors.    Client denied a history of personal safety concerns.    Client denied a history of assaultive behaviors.        Current Safety Concerns:  Client denies current suicidal ideation.    Client denies current homicidal ideation and behaviors.  Client denies current self-injurious ideation and behaviors.    Client denies current concerns for personal safety.    Client reports the  "following protective factors: positive relationships positive social network and positive family connections, forward/future oriented thinking, dedication to family/friends, safe and stable environment, adherence with prescribed medication, living with other people, daily obligations, structured day, committment to well-being, sense of personal control or determination and access to a variety of clinical interventions    Client reports there are firearms in the house. The firearms are secured in a locked space.     Plan for Safety and Risk Management:  Recommended that patient call 911 or go to the local ED should there be a change in any of these risk factors.    Client's Strengths and Limitations:  Client identified the following strengths or resources that will help her succeed in counseling: commitment to health and well being, friends / good social support, family support and intelligence. Client identified the following supports: family and friends. Things that may interfere with the client's success in counseling include: \"lack of communication from/with spouse\".      Diagnostic Criteria:  A. Excessive anxiety and worry about a number of events or activities (such as work or school performance).   B. The person finds it difficult to control the worry.  C. Select 3 or more symptoms (required for diagnosis). Only one item is required in children.   - Restlessness or feeling keyed up or on edge.    - Being easily fatigued.    - Irritability.    - Sleep disturbance (difficulty falling or staying asleep, or restless unsatisfying sleep).   D. The focus of the anxiety and worry is not confined to features of an Axis I disorder.  E. The anxiety, worry, or physical symptoms cause clinically significant distress or impairment in social, occupational, or other important areas of functioning.   F. The disturbance is not due to the direct physiological effects of a substance (e.g., a drug of abuse, a medication) or a " general medical condition (e.g., hyperthyroidism) and does not occur exclusively during a Mood Disorder, a Psychotic Disorder, or a Pervasive Developmental Disorder.    - The aformentioned symptoms began   year(s) ago and occurs 5 days per week and is experienced as mild.      Functional Status:  Client's symptoms are causing reduced functional status in the following areas: Social / Relational - client reported marital conflict      DSM5 Diagnoses: (Sustained by DSM5 Criteria Listed Above)  Diagnoses: 300.02 (F41.1) Generalized Anxiety Disorder  Psychosocial & Contextual Factors: marital conflict, financial stress  WHODAS 2.0 (12 item)            This questionnaire asks about difficulties due to health conditions. Health conditions  include  disease or illnesses, other health problems that may be short or long lasting,  injuries, mental health or emotional problems, and problems with alcohol or drugs.                     Think back over the past 30 days and answer these questions, thinking about how much  difficulty you had doing the following activities. For each question, please [place] only  one response.    S1 Standing for long periods such as 30 minutes? None =         1   S2 Taking care of household responsibilities? None =         1   S3 Learning a new task, for example, learning how to get to a new place? None =         1   S4 How much of a problem do you have joining community activities (for example, festivals, [employer] or other activities) in the same way as anyone else can? Mild =           2   S5 How much have you been emotionally affected by your health problems? Mild =           2     In the past 30 days, how much difficulty did you have in:   S6 Concentrating on doing something for ten minutes? None =         1   S7 Walking a long distance such as a kilometer (or equivalent)? None =         1   S8 Washing your whole body? None =         1   S9 Getting dressed? None =         1   S10 Dealing with  people you do not know? None =         1   S11 Maintaining a friendship? None =         1   S12 Your day to day work? None =         1     H1 Overall, in the past 30 days, how many days were these difficulties present? Record number of days 0   H2 In the past 30 days, for how many days were you totally unable to carry out your usual activities or work because of any health condition? Record number of days  0   H3 In the past 30 days, not counting the days that you were totally unable, for how many days did you cut back or reduce your usual activities or work because of any health condition? Record number of days 0     Attendance Agreement:  Client has signed Attendance Agreement:Yes      Collaboration:  Collaboration / coordination of treatment will be initiated with the following support professionals: primary care physician.      Preliminary Treatment Plan:  The client reports no currently identified Adventism, ethnic or cultural issues relevant to therapy.     services are not indicated.    Modifications to assist communication are not indicated.    The concerns identified by the client will be addressed in therapy.    Initial Treatment will focus on: Anxiety - increase calm mindset  Relational Problems related to: Conflict or difficulties with partner/spouse.    As a preliminary treatment goal, client will experience a reduction in anxiety, will develop more effective coping skills to manage anxiety symptoms, will develop healthy cognitive patterns and beliefs and will increase ability to function adaptively and will address relationship difficulties in a more adaptive manner.    The focus of initial interventions will be to alleviate anxiety, increase ability to function adaptively, increase coping skills, increase trust, teach CBT skills, teach communication skills, teach conflict management skills, teach DBT skills, teach distress tolerance skills, teach emotional regulation and teach mindfulness  skills.    Referral to another professional/service is not indicated at this time..    A Release of Information is not needed at this time.    Report to child / adult protection services was NA.    Patient will have open access to their mental health medical record.    TONY Rice  January 3, 2020

## 2020-01-24 NOTE — PROGRESS NOTES
[facility]  [address] [address] [address]  [phone]  Dept: [phone]    PRE-OP EVALUATION:  Today's date: 2020    Zaida Lao (: 1975) presents for pre-operative evaluation assessment as requested by Dr. Guan.  She requires evaluation and anesthesia risk assessment prior to undergoing surgery/procedure for treatment of Nasal obstruction surgery. Having tonsils removed, adenoid removed, and deviated septoplasty.     Fax number for surgical facility: [phone]  Primary Physician: Elzbieta Pulido  Type of Anesthesia Anticipated: General    Patient has a Health Care Directive or Living Will:  NO    Preop Questions 2020   Who is doing your surgery? dr guan   What are you having done? septum adenoid tonsil   Date of Surgery/Procedure: 2020   Facility or Hospital where procedure/surgery will be performed: [facility]   1.  Do you have a history of Heart attack, stroke, stent, coronary bypass surgery, or other heart surgery? No   2.  Do you ever have any pain or discomfort in your chest? No   3.  Do you have a history of  Heart Failure? No   4.   Are you troubled by shortness of breath when:  walking on a level surface, or up a slight hill, or at night? No   5.  Do you currently have a cold, bronchitis or other respiratory infection? No   6.  Do you have a cough, shortness of breath, or wheezing? No   7.  Do you sometimes get pains in the calves of your legs when you walk? No   8. Do you or anyone in your family have previous history of blood clots? No   9.  Do you or does anyone in your family have a serious bleeding problem such as prolonged bleeding following surgeries or cuts? No   10. Have you ever had problems with anemia or been told to take iron pills? No   11. Have you had any abnormal blood loss such as black, tarry or bloody stools, or abnormal vaginal bleeding? No   12. Have you ever had a blood transfusion? No   13. Have  you or any of your relatives ever had problems with anesthesia? No   14. Do you have sleep apnea, excessive snoring or daytime drowsiness? YES - sleep apnea.  Has cpap but is not comfortable so not wearing currently. Surgery should help her breathe better.    15. Do you have any prosthetic heart valves? No   16. Do you have prosthetic joints? No   17. Is there any chance that you may be pregnant? No         HPI:     HPI related to upcoming procedure: patient has deviated septum, chronic tonsillitis, and sleep apnea not able to tolerate her mask per patient so not using her CPAP. She is morbidly obese.  Having tonsils removed, adenoid removed, and deviated septoplasty.         Morbid obesity-bmi 39 co-morbidity of LOC.     Anxiety-stable on medications. Denies suicidal or homicidal thoughts.  Patient instructed to go to the emergency room or call 911 if these occur.      MEDICAL HISTORY:     Patient Active Problem List    Diagnosis Date Noted     Nasal obstruction 12/09/2019     Priority: Medium     Added automatically from request for surgery [id number]       Nasal septal deviation 12/09/2019     Priority: Medium     Added automatically from request for surgery [id number]       Nasal turbinate hypertrophy 12/09/2019     Priority: Medium     Added automatically from request for surgery [id number]       Adenoid hypertrophy 12/09/2019     Priority: Medium     Added automatically from request for surgery [id number]       Chronic tonsillitis 12/09/2019     Priority: Medium     Added automatically from request for surgery [id number]       LOC (obstructive sleep apnea) 11/22/2019     Priority: Medium     Insomnia, unspecified type 11/22/2019     Priority: Medium     BMI 36.0-36.9,adult 06/30/2017     Priority: Medium     Post-nasal drip 12/27/2013     Priority: Medium     Nasal polyp 12/27/2013     Priority: Medium     Need for Tdap vaccination 12/18/2013     Priority: Medium     Given 04/29/14       Cough 12/18/2013     Priority: Medium      Bilateral occipital neuralgia 02/03/2013     Priority: Medium     Headache 01/31/2013     Priority: Medium     Patient has had Migraine HA for the past 2 years ever since getting pregnant.  She has had several HA per week initially and was getting Butalbitol previously with very good relief and they have calmed down some, but she still has severe HA 2-3 times a month.  She also has a dull HA nearly every day.      She has photophobia, nausea, vomiting, pain that starts in the back of her neck bilat and also sometimes in the frontal region- bilaterally.   She uses Excedrin Migraine with Benadryl with sometimes benefit.  Has tried Aleve, and taken no more than 4 tabs of Aleve in a week.  She has also tried the [facility] Excedrin Migraine med- perhaps weekly.      She does not usually take caffeine.       She recently went to an ER- which did a head CT (which patient states was normal) and     She works as a dental hygienist and feels like it may have to do with her posture- leaning forwards.  The son now sleeps mostly through the night.  She also endorses snoring, which she feels is due to mouth breathing due to deviated septum.      She has seasonal allergies, and does take some OTC Zyrtec during the fall, and occasionally Flonase.      She has been to a chiropractor, with no benefit.  She does not do any daily routine/exercises for her head/neck posture.  She has also seen Dr. Denney for Tennis Elbow- which helps.      2/14/2013  Pt returns, she continues to have daily, all day headaches.  There was a good response to the Imitrex, but she was hesitant to take it more often given that she was having daily headaches.  She has been doing the stretches and exercises and feels like there is some improvement.  She still has some sensitivity with her occipital neuralgia.    Problem list name updated by automated process. Provider to review and confirm  Problem list name updated by automated process. Provider to review        Kidney stones 2011     Priority: Medium     + Kidney stone on R in  with Lithotripsy.     - hx of kidney pain without stones in preg           Obesity 2011     Priority: Medium     Body mass index is 38.09 kg/(m^2)..    2013  Patient interested in loosing weight.  She believes her weight gain has been from her last children.         Seasonal allergies      Priority: Medium     Zyrtec helps and has an Rx for Flonase       CARDIOVASCULAR SCREENING; LDL GOAL LESS THAN 160 10/31/2010     Priority: Medium     Anxiety 2010     Priority: Medium     Takes Buspar (15 mg BID) and Celexa (40 mg Q day)--stable on these meds  No counseling.        Past Medical History:   Diagnosis Date     Human papillomavirus in conditions classified elsewhere and of unspecified site     genital warts , resolved     Seasonal allergies     Zyrtec helps and has an Rx for Flonase     Past Surgical History:   Procedure Laterality Date      SECTION  2011    Procedure: SECTION; Surgeon:CHRISSY BRITTON; Location:[facility] L+[facility]      SECTION  2014    Procedure:  SECTION;  Surgeon: Chrissy Britton MD;  Location: [facility] L[facility]     GYN SURGERY      tubal ligation     HC TOOTH EXTRACTION W/FORCEP      one dry socket     RW DENTAL (ABSTRACTED)  1985     SURGICAL HISTORY OF -       lithotripsy     Current Outpatient Medications   Medication Sig Dispense Refill     ALPRAZolam (XANAX) 0.25 MG tablet Take 1 tablet (0.25 mg) by mouth daily as needed for anxiety Do not mix with alcohol or drive after taking 30 tablet 0     escitalopram (LEXAPRO) 20 MG tablet Take 1 tablet (20 mg) by mouth daily 90 tablet 1     fluticasone (FLONASE) 50 MCG/ACT spray Spray 1 spray into both nostrils daily       hydrOXYzine (ATARAX) 25 MG tablet Take 1-2 tablets (25-50 mg) by mouth nightly as needed for itching 30 tablet 1     OTC products: None, except as noted above    Allergies   Allergen Reactions  "    Seasonal Allergies      Cold symptoms, stuffy nose, itchy throat and eyes     Sulfa Drugs      not effective      Latex Allergy: NO    Social History     Tobacco Use     Smoking status: Former Smoker     Types: Cigarettes     Smokeless tobacco: Never Used   Substance Use Topics     Alcohol use: No     History   Drug Use No       REVIEW OF SYSTEMS:   Constitutional, neuro, ENT, endocrine, pulmonary, cardiac, gastrointestinal, genitourinary, musculoskeletal, integument and psychiatric systems are negative, except as otherwise noted.    EXAM:   /88   Pulse 79   Temp 98.2  F (36.8  C) (Oral)   Resp 16   Ht 1.689 m (5' 6.5\")   Wt 111.6 kg (246 lb)   SpO2 98%   Breastfeeding No   BMI 39.11 kg/m      GENERAL APPEARANCE: alert, active and over weight     EYES: EOMI, PERRL     HENT: ear canals and TM's normal and nose and mouth without ulcers or lesions     NECK: no adenopathy, no asymmetry, masses, or scars and thyroid normal to palpation     RESP: lungs clear to auscultation - no rales, rhonchi or wheezes     CV: regular rates and rhythm, normal S1 S2, no S3 or S4 and no murmur, click or rub     MS: extremities normal- no gross deformities noted, no evidence of inflammation in joints, FROM in all extremities.     SKIN: no suspicious lesions or rashes     NEURO: Normal strength and tone, sensory exam grossly normal, mentation intact and speech normal     PSYCH: mentation appears normal. and affect normal/bright     LYMPHATICS: No cervical adenopathy    DIAGNOSTICS:   EKG: Not indicated due to non-vascular surgery and low risk of event (age <65 and without cardiac risk factors)    Recent Labs   Lab Test 06/30/17  1108 07/15/14  0645 07/11/14  1515  06/23/14  1030  05/29/14  1313   HGB  --  9.3* 12.0   < > 12.0   < > 13.0   PLT  --   --  288  --  283   < > 304     --   --   --   --   --   --    POTASSIUM 4.3  --   --   --   --   --   --    CR 0.72  --   --   --   --   --  0.60    < > = values in this " interval not displayed.        Labs:  Hemoglobin done on 2-6 through care everywhere-result was normal 14.8.   BMP done that same day, normal creatinine.     Urine pregnancy test-patient has tubal she declined test. Just had a negative 2 weeks ago through emergency room.     IMPRESSION:   Reason for surgery/procedure: tonsillectomy, nasal surgery  Diagnosis/reason for consult: breathing issues, chronic tonsillitis    Patient had a lot of questions today all of which were answered. She is traveling to [place] soon.     The proposed surgical procedure is considered INTERMEDIATE risk.    REVISED CARDIAC RISK INDEX  The patient has the following serious cardiovascular risks for perioperative complications such as (MI, PE, VFib and 3  AV Block):  No serious cardiac risks  INTERPRETATION: 0 risks: Class I (very low risk - 0.4% complication rate)    The patient has the following additional risks for perioperative complications:  Morbid obesity      ICD-10-CM    1. Preop general physical exam Z01.818    2. Nasal obstruction J34.89    3. Morbid obesity (H) E66.01    4. Chronic tonsillitis J35.01        RECOMMENDATIONS:     --Consult hospital rounder / IM to assist post-op medical management    Obstructive Sleep Apnea (or suspected sleep apnea)  Hospital staff are advised to monitor for sleep related oxygen desaturations due to suspicion of LOC      --Patient is to take all scheduled medications on the day of surgery EXCEPT for modifications listed below.  nsaids see below  No xanax in the am      APPROVAL GIVEN to proceed with proposed procedure, without further diagnostic evaluation     Patient Instructions   No advil or aleve 7 days before surgery or as directed by surgeon            Before Your Surgery      Call your surgeon if there is any change in your health. This includes signs of a cold or flu (such as a sore throat, runny nose, cough, rash or fever).    Do not smoke, drink alcohol or take over the counter medicine  (unless your surgeon or primary care doctor tells you to) for the 24 hours before and after surgery.    If you take prescribed drugs: Follow your doctor s orders about which medicines to take and which to stop until after surgery.    Eating and drinking prior to surgery: follow the instructions from your surgeon    Take a shower or bath the night before surgery. Use the soap your surgeon gave you to gently clean your skin. If you do not have soap from your surgeon, use your regular soap. Do not shave or scrub the surgery site.  Wear clean pajamas and have clean sheets on your bed.       Signed Electronically by: Yara Han PA-C  Agreed with above. Yared Whittington M.D.      Copy of this evaluation report is provided to requesting physician.    [facility] Preop Guidelines    Revised Cardiac Risk Index

## 2020-01-24 NOTE — PATIENT INSTRUCTIONS
No advil or aleve 7 days before surgery or as directed by surgeon            Before Your Surgery      Call your surgeon if there is any change in your health. This includes signs of a cold or flu (such as a sore throat, runny nose, cough, rash or fever).    Do not smoke, drink alcohol or take over the counter medicine (unless your surgeon or primary care doctor tells you to) for the 24 hours before and after surgery.    If you take prescribed drugs: Follow your doctor s orders about which medicines to take and which to stop until after surgery.    Eating and drinking prior to surgery: follow the instructions from your surgeon    Take a shower or bath the night before surgery. Use the soap your surgeon gave you to gently clean your skin. If you do not have soap from your surgeon, use your regular soap. Do not shave or scrub the surgery site.  Wear clean pajamas and have clean sheets on your bed.

## 2020-02-06 ENCOUNTER — TRANSFERRED RECORDS (OUTPATIENT)
Dept: HEALTH INFORMATION MANAGEMENT | Facility: CLINIC | Age: 45
End: 2020-02-06

## 2020-02-21 ENCOUNTER — OFFICE VISIT (OUTPATIENT)
Dept: PSYCHOLOGY | Facility: CLINIC | Age: 45
End: 2020-02-21
Payer: COMMERCIAL

## 2020-02-21 ENCOUNTER — OFFICE VISIT (OUTPATIENT)
Dept: FAMILY MEDICINE | Facility: CLINIC | Age: 45
End: 2020-02-21
Payer: COMMERCIAL

## 2020-02-21 VITALS
HEART RATE: 79 BPM | BODY MASS INDEX: 38.61 KG/M2 | OXYGEN SATURATION: 98 % | RESPIRATION RATE: 16 BRPM | TEMPERATURE: 98.2 F | HEIGHT: 67 IN | SYSTOLIC BLOOD PRESSURE: 130 MMHG | WEIGHT: 246 LBS | DIASTOLIC BLOOD PRESSURE: 88 MMHG

## 2020-02-21 DIAGNOSIS — Z01.818 PREOP GENERAL PHYSICAL EXAM: Primary | ICD-10-CM

## 2020-02-21 DIAGNOSIS — J35.01 CHRONIC TONSILLITIS: ICD-10-CM

## 2020-02-21 DIAGNOSIS — E66.01 MORBID OBESITY (H): ICD-10-CM

## 2020-02-21 DIAGNOSIS — J34.89 NASAL OBSTRUCTION: ICD-10-CM

## 2020-02-21 DIAGNOSIS — F41.1 GENERALIZED ANXIETY DISORDER: Primary | ICD-10-CM

## 2020-02-21 PROCEDURE — 99215 OFFICE O/P EST HI 40 MIN: CPT | Performed by: PHYSICIAN ASSISTANT

## 2020-02-21 PROCEDURE — 90847 FAMILY PSYTX W/PT 50 MIN: CPT | Performed by: MARRIAGE & FAMILY THERAPIST

## 2020-02-21 ASSESSMENT — ANXIETY QUESTIONNAIRES
2. NOT BEING ABLE TO STOP OR CONTROL WORRYING: MORE THAN HALF THE DAYS
GAD7 TOTAL SCORE: 12
GAD7 TOTAL SCORE: 12
7. FEELING AFRAID AS IF SOMETHING AWFUL MIGHT HAPPEN: MORE THAN HALF THE DAYS
7. FEELING AFRAID AS IF SOMETHING AWFUL MIGHT HAPPEN: MORE THAN HALF THE DAYS
6. BECOMING EASILY ANNOYED OR IRRITABLE: MORE THAN HALF THE DAYS
GAD7 TOTAL SCORE: 12
3. WORRYING TOO MUCH ABOUT DIFFERENT THINGS: MORE THAN HALF THE DAYS
4. TROUBLE RELAXING: SEVERAL DAYS
1. FEELING NERVOUS, ANXIOUS, OR ON EDGE: NEARLY EVERY DAY
5. BEING SO RESTLESS THAT IT IS HARD TO SIT STILL: NOT AT ALL

## 2020-02-21 ASSESSMENT — MIFFLIN-ST. JEOR: SCORE: 1795.54

## 2020-02-21 ASSESSMENT — PATIENT HEALTH QUESTIONNAIRE - PHQ9
SUM OF ALL RESPONSES TO PHQ QUESTIONS 1-9: 3
SUM OF ALL RESPONSES TO PHQ QUESTIONS 1-9: 3
10. IF YOU CHECKED OFF ANY PROBLEMS, HOW DIFFICULT HAVE THESE PROBLEMS MADE IT FOR YOU TO DO YOUR WORK, TAKE CARE OF THINGS AT HOME, OR GET ALONG WITH OTHER PEOPLE: NOT DIFFICULT AT ALL

## 2020-02-21 NOTE — PROGRESS NOTES
Progress Note    Patient Name: Zaida Lao  Date: 2/21/20         Service Type: Family with client present  Video Visit: No     Session Start Time: 12:00  Session End Time: 12:52     Session Length: 38-52    Session #: 1    Attendees: Client and Spouse / Significant Other     Treatment Plan Last Reviewed: 2/21/20, next due 5/21/20.  PHQ-9 / ROMANA-7 : completed.    DATA  Interactive Complexity: No  Crisis: No       Progress Since Last Session (Related to Symptoms / Goals / Homework):   Symptoms: client reported minimal depression and moderate anxiety.    Homework: Achieved / completed to satisfaction  Completed in session      Episode of Care Goals: Minimal progress - PREPARATION (Decided to change - considering how); Intervened by negotiating a change plan and determining options / strategies for behavior change, identifying triggers, exploring social supports, and working towards setting a date to begin behavior change     Current / Ongoing Stressors and Concerns:   Client and her spouse reported continuing to be in conflict about finances.  Client's spouse reported his perspective that he thinks client doesn't take finances seriously enough, and client identified experiencing significant anxiety contributing to conflict.  They identified wanting to work on increasing calm and improving communication as their desired therapy goals.     Treatment Objective(s) Addressed in This Session:   learn & utilize at least some assertive communication skills weekly  Client and her spouse identified their desired therapy goals.     Intervention:   Interpersonal Therapy: taught/reviewed with client identifying and communicting their needs for conflict resolution.  Motivational Interviewing: used circular/Socratic questioning to ellcit client's/spouse's identification of their desired therapy goals.        ASSESSMENT: Current Emotional / Mental Status (status of significant  symptoms):   Risk status (Self / Other harm or suicidal ideation)   Patient denies current fears or concerns for personal safety.   Patient denies current or recent suicidal ideation or behaviors.   Patientdenies current or recent homicidal ideation or behaviors.   Patient denies current or recent self injurious behavior or ideation.   Patient denies other safety concerns.   Patient reports there has been no change in risk factors since their last session.     Patientreports there has been no change in protective factors since their last session.     Recommended that patient call 911 or go to the local ED should there be a change in any of these risk factors.     Appearance:   Appropriate    Eye Contact:   Good    Psychomotor Behavior: Normal    Attitude:   Cooperative    Orientation:   All   Speech    Rate / Production: Normal  Pressured     Volume:  Loud    Mood:    Anxious  Elevated  Irritable  Normal   Affect:    Appropriate  Labile  Worrisome    Thought Content:  Clear    Thought Form:  Coherent  Logical    Insight:    Fair      Medication Review:   No changes to current psychiatric medication(s)     Medication Compliance:   Yes     Changes in Health Issues:   None reported     Chemical Use Review:   Substance Use: Chemical use reviewed, no active concerns identified      Tobacco Use: No current tobacco use.      Diagnosis:  1. Generalized anxiety disorder        Collateral Reports Completed:   Not Applicable    PLAN: (Patient Tasks / Therapist Tasks / Other)  Client and her spouse agreed to work on identifying and communicating their needs for conflict resolution.        TONY Rice                                                         ______________________________________________________________________    Treatment Plan    Patient's Name: Zaida Lao  YOB: 1975    Date: 2/21/20    DSM5 Diagnoses: 300.02 (F41.1) Generalized Anxiety Disorder  Psychosocial / Contextual  Factors: marital conflict, financial stress  WHODAS: 14    Referral / Collaboration:  Referral to another professional/service is not indicated at this time..    Anticipated number of session or this episode of care: 11-20      MeasurableTreatment Goal(s) related to diagnosis / functional impairment(s)  Goal 1: Client will increase overall baseline calm mindset by 2 points on a 1-10 Likert scale per self-report (10 = optimal calm mindset).    I will know I've met my goal when I feel less anxious.      Objective #A (Client Action)    Status: New - Date: 2/21/20     Client will use cognitive strategies identified in therapy to challenge anxious thoughts.    Intervention(s)  Therapist will teach emotional regulation skills. CBT/REBT ABCD model.    Objective #B  Client will use at least 2 new coping skills for anxiety management in the next 12 weeks.    Status: New - Date: 2/21/20    Intervention(s)  Therapist will teach emotional regulation skills. DBT Core Mindfulness, Distress Tolerance, Emotion Regulation, and Interpersonal Effectiveness skills.    Goal 2: Client will improve her interpersonal functioning in relationships by 2 points on a 1-10 Likert scale per self-report (10 = optimal interpersonal functioning).    I will know I've met my goal when my communication and relationships have improved.      Objective #A (Client Action)    Status: New - Date: 2/21/20     Client will learn & utilize at least 2 assertive communication skills weekly.    Intervention(s)  Therapist will teach assertiveness skills. Nonviolent Communication and DBT Interpersonal Effectiveness skills..    Objective #B  Client will practice setting boundaries 2 times in the next 12 weeks.    Status: New - Date: 2/21/20     Intervention(s)  Therapist will teach assertiveness skills. [place]-setting..    Patient and significant other/spouse have reviewed and agreed to the above plan.      Nick Mcgee, LMFT  February 21, 2020

## 2020-02-22 ASSESSMENT — PATIENT HEALTH QUESTIONNAIRE - PHQ9: SUM OF ALL RESPONSES TO PHQ QUESTIONS 1-9: 3

## 2020-02-22 ASSESSMENT — ANXIETY QUESTIONNAIRES: GAD7 TOTAL SCORE: 12

## 2020-03-06 ENCOUNTER — ANESTHESIA EVENT (OUTPATIENT)
Dept: SURGERY | Facility: AMBULATORY SURGERY CENTER | Age: 45
End: 2020-03-06

## 2020-03-06 ENCOUNTER — OFFICE VISIT (OUTPATIENT)
Dept: PSYCHOLOGY | Facility: CLINIC | Age: 45
End: 2020-03-06
Payer: COMMERCIAL

## 2020-03-06 DIAGNOSIS — F41.1 GENERALIZED ANXIETY DISORDER: Primary | ICD-10-CM

## 2020-03-06 PROCEDURE — 90847 FAMILY PSYTX W/PT 50 MIN: CPT | Performed by: MARRIAGE & FAMILY THERAPIST

## 2020-03-06 ASSESSMENT — PATIENT HEALTH QUESTIONNAIRE - PHQ9
SUM OF ALL RESPONSES TO PHQ QUESTIONS 1-9: 3
SUM OF ALL RESPONSES TO PHQ QUESTIONS 1-9: 3

## 2020-03-06 ASSESSMENT — ANXIETY QUESTIONNAIRES
4. TROUBLE RELAXING: MORE THAN HALF THE DAYS
5. BEING SO RESTLESS THAT IT IS HARD TO SIT STILL: NOT AT ALL
6. BECOMING EASILY ANNOYED OR IRRITABLE: MORE THAN HALF THE DAYS
1. FEELING NERVOUS, ANXIOUS, OR ON EDGE: NEARLY EVERY DAY
3. WORRYING TOO MUCH ABOUT DIFFERENT THINGS: MORE THAN HALF THE DAYS
7. FEELING AFRAID AS IF SOMETHING AWFUL MIGHT HAPPEN: MORE THAN HALF THE DAYS
7. FEELING AFRAID AS IF SOMETHING AWFUL MIGHT HAPPEN: MORE THAN HALF THE DAYS
GAD7 TOTAL SCORE: 13
2. NOT BEING ABLE TO STOP OR CONTROL WORRYING: MORE THAN HALF THE DAYS
GAD7 TOTAL SCORE: 13
GAD7 TOTAL SCORE: 13

## 2020-03-06 NOTE — PROGRESS NOTES
Progress Note    Patient Name: Zaida Lao  Date: 3/6/20         Service Type: Family with client present  Video Visit: No     Session Start Time: 12:00  Session End Time: 12:52     Session Length: 38-52    Session #: 2    Attendees: Client and Spouse / Significant Other     Treatment Plan Last Reviewed: 2/21/20, next due 5/21/20.  PHQ-9 / ROMANA-7 : completed.    DATA  Interactive Complexity: No  Crisis: No       Progress Since Last Session (Related to Symptoms / Goals / Homework):   Symptoms: Worsening client reported increased ROMANA-7 score.    Homework: Partially completed      Episode of Care Goals: Minimal progress - PREPARATION (Decided to change - considering how); Intervened by negotiating a change plan and determining options / strategies for behavior change, identifying triggers, exploring social supports, and working towards setting a date to begin behavior change     Current / Ongoing Stressors and Concerns:   Client and her spouse reported that her anxiety has increased lately, partially related to the corona virus, and that her anxiety leads to conflict between them at times.  Client's spouse reported he struggles with validating client at times.  Client identified that her anxiety and guilt are sometimes dealing with tangible things, where other times they are about things in the future/out of her control.     Treatment Objective(s) Addressed in This Session:   learn & utilize at least some assertive communication skills weekly     Intervention:   Interpersonal Therapy: taught/reviewed with client/spouse identifying and communicting what she needs when feeling anxious.        ASSESSMENT: Current Emotional / Mental Status (status of significant symptoms):   Risk status (Self / Other harm or suicidal ideation)   Patient denies current fears or concerns for personal safety.   Patient denies current or recent suicidal ideation or behaviors.   Patientdenies  current or recent homicidal ideation or behaviors.   Patient denies current or recent self injurious behavior or ideation.   Patient denies other safety concerns.   Patient reports there has been no change in risk factors since their last session.     Patientreports there has been no change in protective factors since their last session.     Recommended that patient call 911 or go to the local ED should there be a change in any of these risk factors.     Appearance:   Appropriate    Eye Contact:   Good    Psychomotor Behavior: Normal    Attitude:   Cooperative    Orientation:   All   Speech    Rate / Production: Normal  Pressured     Volume:  Loud    Mood:    Anxious  Elevated  Normal   Affect:    Appropriate  Labile  Worrisome    Thought Content:  Clear    Thought Form:  Coherent  Logical    Insight:    Fair      Medication Review:   No changes to current psychiatric medication(s)     Medication Compliance:   Yes     Changes in Health Issues:   None reported     Chemical Use Review:   Substance Use: Chemical use reviewed, no active concerns identified      Tobacco Use: No current tobacco use.      Diagnosis:  1. Generalized anxiety disorder        Collateral Reports Completed:   Not Applicable    PLAN: (Patient Tasks / Therapist Tasks / Other)  Client and her spouse agreed to work on identifying and assertively/directly communicating about their feelings and needs from each other.        Nick Mcgee LMFT                                                         ______________________________________________________________________    Treatment Plan    Patient's Name: Zaida Lao  YOB: 1975    Date: 2/21/20    DSM5 Diagnoses: 300.02 (F41.1) Generalized Anxiety Disorder  Psychosocial / Contextual Factors: marital conflict, financial stress  WHODAS: 14    Referral / Collaboration:  Referral to another professional/service is not indicated at this time..    Anticipated number of session  or this episode of care: 11-20      MeasurableTreatment Goal(s) related to diagnosis / functional impairment(s)  Goal 1: Client will increase overall baseline calm mindset by 2 points on a 1-10 Likert scale per self-report (10 = optimal calm mindset).    I will know I've met my goal when I feel less anxious.      Objective #A (Client Action)    Status: New - Date: 2/21/20     Client will use cognitive strategies identified in therapy to challenge anxious thoughts.    Intervention(s)  Therapist will teach emotional regulation skills. CBT/REBT ABCD model.    Objective #B  Client will use at least 2 new coping skills for anxiety management in the next 12 weeks.    Status: New - Date: 2/21/20    Intervention(s)  Therapist will teach emotional regulation skills. DBT Core Mindfulness, Distress Tolerance, Emotion Regulation, and Interpersonal Effectiveness skills.    Goal 2: Client will improve her interpersonal functioning in relationships by 2 points on a 1-10 Likert scale per self-report (10 = optimal interpersonal functioning).    I will know I've met my goal when my communication and relationships have improved.      Objective #A (Client Action)    Status: New - Date: 2/21/20     Client will learn & utilize at least 2 assertive communication skills weekly.    Intervention(s)  Therapist will teach assertiveness skills. Nonviolent Communication and DBT Interpersonal Effectiveness skills..    Objective #B  Client will practice setting boundaries 2 times in the next 12 weeks.    Status: New - Date: 2/21/20     Intervention(s)  Therapist will teach assertiveness skills. Sullivan-setting..    Patient and significant other/spouse have reviewed and agreed to the above plan.      TONY Rice  March 6, 2020

## 2020-03-07 ASSESSMENT — ANXIETY QUESTIONNAIRES: GAD7 TOTAL SCORE: 13

## 2020-03-07 ASSESSMENT — PATIENT HEALTH QUESTIONNAIRE - PHQ9: SUM OF ALL RESPONSES TO PHQ QUESTIONS 1-9: 3

## 2020-03-09 ENCOUNTER — HOSPITAL ENCOUNTER (OUTPATIENT)
Facility: AMBULATORY SURGERY CENTER | Age: 45
Discharge: HOME | End: 2020-03-09
Attending: OTOLARYNGOLOGY | Admitting: OTOLARYNGOLOGY
Payer: COMMERCIAL

## 2020-03-09 ENCOUNTER — ANESTHESIA (OUTPATIENT)
Dept: SURGERY | Facility: AMBULATORY SURGERY CENTER | Age: 45
End: 2020-03-09
Payer: COMMERCIAL

## 2020-03-09 VITALS
TEMPERATURE: 98.2 F | DIASTOLIC BLOOD PRESSURE: 76 MMHG | SYSTOLIC BLOOD PRESSURE: 132 MMHG | RESPIRATION RATE: 14 BRPM | HEART RATE: 88 BPM | OXYGEN SATURATION: 98 %

## 2020-03-09 DIAGNOSIS — J34.2 NASAL SEPTAL DEVIATION: ICD-10-CM

## 2020-03-09 DIAGNOSIS — J35.01 CHRONIC TONSILLITIS: ICD-10-CM

## 2020-03-09 DIAGNOSIS — J34.89 NASAL OBSTRUCTION: ICD-10-CM

## 2020-03-09 DIAGNOSIS — J35.2 ADENOID HYPERTROPHY: ICD-10-CM

## 2020-03-09 DIAGNOSIS — J34.3 NASAL TURBINATE HYPERTROPHY: ICD-10-CM

## 2020-03-09 LAB — HCG UR QL: NEGATIVE

## 2020-03-09 PROCEDURE — 88304 TISSUE EXAM BY PATHOLOGIST: CPT | Performed by: OTOLARYNGOLOGY

## 2020-03-09 PROCEDURE — G8916 PT W IV AB GIVEN ON TIME: HCPCS

## 2020-03-09 PROCEDURE — 42821 REMOVE TONSILS AND ADENOIDS: CPT

## 2020-03-09 PROCEDURE — 30140 RESECT INFERIOR TURBINATE: CPT | Mod: 50

## 2020-03-09 PROCEDURE — 42821 REMOVE TONSILS AND ADENOIDS: CPT | Performed by: OTOLARYNGOLOGY

## 2020-03-09 PROCEDURE — 30520 REPAIR OF NASAL SEPTUM: CPT

## 2020-03-09 PROCEDURE — 30520 REPAIR OF NASAL SEPTUM: CPT | Performed by: OTOLARYNGOLOGY

## 2020-03-09 PROCEDURE — G8907 PT DOC NO EVENTS ON DISCHARG: HCPCS

## 2020-03-09 PROCEDURE — 30140 RESECT INFERIOR TURBINATE: CPT | Mod: 50 | Performed by: OTOLARYNGOLOGY

## 2020-03-09 PROCEDURE — 81025 URINE PREGNANCY TEST: CPT | Performed by: ANESTHESIOLOGY

## 2020-03-09 RX ORDER — LIDOCAINE 40 MG/G
CREAM TOPICAL
Status: DISCONTINUED | OUTPATIENT
Start: 2020-03-09 | End: 2020-03-10 | Stop reason: HOSPADM

## 2020-03-09 RX ORDER — FENTANYL CITRATE 50 UG/ML
INJECTION, SOLUTION INTRAMUSCULAR; INTRAVENOUS PRN
Status: DISCONTINUED | OUTPATIENT
Start: 2020-03-09 | End: 2020-03-09

## 2020-03-09 RX ORDER — SODIUM CHLORIDE, SODIUM LACTATE, POTASSIUM CHLORIDE, CALCIUM CHLORIDE 600; 310; 30; 20 MG/100ML; MG/100ML; MG/100ML; MG/100ML
INJECTION, SOLUTION INTRAVENOUS CONTINUOUS
Status: DISCONTINUED | OUTPATIENT
Start: 2020-03-09 | End: 2020-03-10 | Stop reason: HOSPADM

## 2020-03-09 RX ORDER — OXYCODONE HCL 5 MG/5 ML
5-10 SOLUTION, ORAL ORAL EVERY 4 HOURS PRN
Qty: 420 ML | Refills: 0 | Status: SHIPPED | OUTPATIENT
Start: 2020-03-09 | End: 2020-11-13

## 2020-03-09 RX ORDER — PROPOFOL 10 MG/ML
INJECTION, EMULSION INTRAVENOUS CONTINUOUS PRN
Status: DISCONTINUED | OUTPATIENT
Start: 2020-03-09 | End: 2020-03-09

## 2020-03-09 RX ORDER — ONDANSETRON 4 MG/1
4 TABLET, ORALLY DISINTEGRATING ORAL EVERY 30 MIN PRN
Status: DISCONTINUED | OUTPATIENT
Start: 2020-03-09 | End: 2020-03-10 | Stop reason: HOSPADM

## 2020-03-09 RX ORDER — DEXAMETHASONE SODIUM PHOSPHATE 4 MG/ML
10 INJECTION, SOLUTION INTRA-ARTICULAR; INTRALESIONAL; INTRAMUSCULAR; INTRAVENOUS; SOFT TISSUE ONCE
Status: COMPLETED | OUTPATIENT
Start: 2020-03-09 | End: 2020-03-09

## 2020-03-09 RX ORDER — LIDOCAINE HYDROCHLORIDE 20 MG/ML
INJECTION, SOLUTION INFILTRATION; PERINEURAL PRN
Status: DISCONTINUED | OUTPATIENT
Start: 2020-03-09 | End: 2020-03-09

## 2020-03-09 RX ORDER — CEFAZOLIN SODIUM 1 G/3ML
1 INJECTION, POWDER, FOR SOLUTION INTRAMUSCULAR; INTRAVENOUS SEE ADMIN INSTRUCTIONS
Status: DISCONTINUED | OUTPATIENT
Start: 2020-03-09 | End: 2020-03-10 | Stop reason: HOSPADM

## 2020-03-09 RX ORDER — CEFAZOLIN SODIUM 2 G/100ML
2 INJECTION, SOLUTION INTRAVENOUS
Status: COMPLETED | OUTPATIENT
Start: 2020-03-09 | End: 2020-03-09

## 2020-03-09 RX ORDER — ONDANSETRON 2 MG/ML
4 INJECTION INTRAMUSCULAR; INTRAVENOUS EVERY 30 MIN PRN
Status: DISCONTINUED | OUTPATIENT
Start: 2020-03-09 | End: 2020-03-10 | Stop reason: HOSPADM

## 2020-03-09 RX ORDER — ACETAMINOPHEN 325 MG/1
975 TABLET ORAL ONCE
Status: COMPLETED | OUTPATIENT
Start: 2020-03-09 | End: 2020-03-09

## 2020-03-09 RX ORDER — PHENYLEPHRINE HYDROCHLORIDE 10 MG/ML
INJECTION INTRAVENOUS PRN
Status: DISCONTINUED | OUTPATIENT
Start: 2020-03-09 | End: 2020-03-09

## 2020-03-09 RX ORDER — MEPERIDINE HYDROCHLORIDE 25 MG/ML
12.5 INJECTION INTRAMUSCULAR; INTRAVENOUS; SUBCUTANEOUS
Status: DISCONTINUED | OUTPATIENT
Start: 2020-03-09 | End: 2020-03-10 | Stop reason: HOSPADM

## 2020-03-09 RX ORDER — FENTANYL CITRATE 50 UG/ML
25-50 INJECTION, SOLUTION INTRAMUSCULAR; INTRAVENOUS
Status: DISCONTINUED | OUTPATIENT
Start: 2020-03-09 | End: 2020-03-10 | Stop reason: HOSPADM

## 2020-03-09 RX ORDER — CEPHALEXIN 250 MG/5ML
500 POWDER, FOR SUSPENSION ORAL 3 TIMES DAILY
Qty: 240 ML | Refills: 0 | Status: SHIPPED | OUTPATIENT
Start: 2020-03-09 | End: 2020-03-17

## 2020-03-09 RX ORDER — GABAPENTIN 300 MG/1
300 CAPSULE ORAL ONCE
Status: COMPLETED | OUTPATIENT
Start: 2020-03-09 | End: 2020-03-09

## 2020-03-09 RX ORDER — ONDANSETRON 2 MG/ML
INJECTION INTRAMUSCULAR; INTRAVENOUS PRN
Status: DISCONTINUED | OUTPATIENT
Start: 2020-03-09 | End: 2020-03-09

## 2020-03-09 RX ORDER — OXYCODONE HYDROCHLORIDE 5 MG/1
5 TABLET ORAL EVERY 4 HOURS PRN
Status: DISCONTINUED | OUTPATIENT
Start: 2020-03-09 | End: 2020-03-10 | Stop reason: HOSPADM

## 2020-03-09 RX ORDER — LABETALOL 20 MG/4 ML (5 MG/ML) INTRAVENOUS SYRINGE
PRN
Status: DISCONTINUED | OUTPATIENT
Start: 2020-03-09 | End: 2020-03-09

## 2020-03-09 RX ORDER — LIDOCAINE HYDROCHLORIDE AND EPINEPHRINE 10; 10 MG/ML; UG/ML
INJECTION, SOLUTION INFILTRATION; PERINEURAL PRN
Status: DISCONTINUED | OUTPATIENT
Start: 2020-03-09 | End: 2020-03-09 | Stop reason: HOSPADM

## 2020-03-09 RX ORDER — PROPOFOL 10 MG/ML
INJECTION, EMULSION INTRAVENOUS PRN
Status: DISCONTINUED | OUTPATIENT
Start: 2020-03-09 | End: 2020-03-09

## 2020-03-09 RX ORDER — ONDANSETRON 4 MG/1
4 TABLET, ORALLY DISINTEGRATING ORAL EVERY 8 HOURS PRN
Qty: 12 TABLET | Refills: 1 | Status: SHIPPED | OUTPATIENT
Start: 2020-03-09 | End: 2020-11-13

## 2020-03-09 RX ORDER — NALOXONE HYDROCHLORIDE 0.4 MG/ML
.1-.4 INJECTION, SOLUTION INTRAMUSCULAR; INTRAVENOUS; SUBCUTANEOUS
Status: DISCONTINUED | OUTPATIENT
Start: 2020-03-09 | End: 2020-03-10 | Stop reason: HOSPADM

## 2020-03-09 RX ADMIN — GABAPENTIN 300 MG: 300 CAPSULE ORAL at 08:53

## 2020-03-09 RX ADMIN — FENTANYL CITRATE 50 MCG: 50 INJECTION, SOLUTION INTRAMUSCULAR; INTRAVENOUS at 11:53

## 2020-03-09 RX ADMIN — ONDANSETRON 4 MG: 2 INJECTION INTRAMUSCULAR; INTRAVENOUS at 09:58

## 2020-03-09 RX ADMIN — Medication 40 MG: at 09:48

## 2020-03-09 RX ADMIN — ACETAMINOPHEN 975 MG: 325 TABLET ORAL at 08:53

## 2020-03-09 RX ADMIN — FENTANYL CITRATE 50 MCG: 50 INJECTION, SOLUTION INTRAMUSCULAR; INTRAVENOUS at 11:39

## 2020-03-09 RX ADMIN — CEFAZOLIN SODIUM 2 G: 2 INJECTION, SOLUTION INTRAVENOUS at 09:52

## 2020-03-09 RX ADMIN — SODIUM CHLORIDE, SODIUM LACTATE, POTASSIUM CHLORIDE, CALCIUM CHLORIDE: 600; 310; 30; 20 INJECTION, SOLUTION INTRAVENOUS at 11:24

## 2020-03-09 RX ADMIN — LABETALOL 20 MG/4 ML (5 MG/ML) INTRAVENOUS SYRINGE 10 MG: at 10:15

## 2020-03-09 RX ADMIN — FENTANYL CITRATE 50 MCG: 50 INJECTION, SOLUTION INTRAMUSCULAR; INTRAVENOUS at 09:48

## 2020-03-09 RX ADMIN — DEXAMETHASONE SODIUM PHOSPHATE 10 MG: 4 INJECTION, SOLUTION INTRA-ARTICULAR; INTRALESIONAL; INTRAMUSCULAR; INTRAVENOUS; SOFT TISSUE at 09:52

## 2020-03-09 RX ADMIN — SODIUM CHLORIDE, SODIUM LACTATE, POTASSIUM CHLORIDE, CALCIUM CHLORIDE: 600; 310; 30; 20 INJECTION, SOLUTION INTRAVENOUS at 09:04

## 2020-03-09 RX ADMIN — PROPOFOL 200 MCG/KG/MIN: 10 INJECTION, EMULSION INTRAVENOUS at 09:50

## 2020-03-09 RX ADMIN — OXYCODONE HYDROCHLORIDE 5 MG: 5 TABLET ORAL at 11:51

## 2020-03-09 RX ADMIN — LIDOCAINE HYDROCHLORIDE 60 MG: 20 INJECTION, SOLUTION INFILTRATION; PERINEURAL at 09:48

## 2020-03-09 RX ADMIN — SODIUM CHLORIDE, SODIUM LACTATE, POTASSIUM CHLORIDE, CALCIUM CHLORIDE: 600; 310; 30; 20 INJECTION, SOLUTION INTRAVENOUS at 09:45

## 2020-03-09 RX ADMIN — PHENYLEPHRINE HYDROCHLORIDE 100 MCG: 10 INJECTION INTRAVENOUS at 10:40

## 2020-03-09 RX ADMIN — OXYCODONE HYDROCHLORIDE 5 MG: 5 TABLET ORAL at 12:19

## 2020-03-09 RX ADMIN — FENTANYL CITRATE 50 MCG: 50 INJECTION, SOLUTION INTRAMUSCULAR; INTRAVENOUS at 12:15

## 2020-03-09 RX ADMIN — PROPOFOL 200 MG: 10 INJECTION, EMULSION INTRAVENOUS at 09:48

## 2020-03-09 NOTE — DISCHARGE INSTRUCTIONS
May take tylenol at 3:00pm    Postoperative Care for Tonsillectomy (with or without adenoidectomy)    Recovery:  1. The pain and swelling almost always gets worse before it gets better, this is normal.  Usually it peaks 3 to 5 days after the surgery, and then begins improving at 7 to 8 days after surgery.  Of course, this is variable from person to person.  2. Maintain a strict soft diet for the first two weeks. Avoid hard or crunchy things.  If it makes a noise when you bite it, it is too hard; or if it requires much chewing, it is too hard.  Although it is good to begin eating again from day one, it is not unusual to not eat for several days after the procedure.  The most important thing is staying hydrated.  Drink plenty of fluids, with electrolytes if possible, such as dilute sports drinks.  3. The liquid pain medication you were sent home with can make some people very nauseated.  To minimize this, avoid taking it on an empty stomach, or take smaller does.  4. Try to stay ahead of the pain.  In other words, do not wait for pain medication to completely wear off before taking more pain medicine.  Instead, take the medication every 4 hours, even if it requires setting an alarm clock at night.  This is especially helpful during the first 5-7 days. You should also add tylenol (and possibly ibuprofen if needed) to help with pain.  5. The uvula (the small hanging object in the back of your mouth) frequently swells up after tonsillectomy, but will go back to normal.  This swelling can temporarily cause the sensation of something being stuck in your throat, it will go away with recovery.  Also, because of the arrangement of nerves under where the tonsils were, sharp ear pain is very common during recovery, and will also go away with recovery. Temporary tongue pain, numbness, or taste disturbance is common, and will go away with time. Foul breath is common, and is part of the healing process. This too will go away with  time.      Activity - Avoid heavy lifting (greater than 10 pounds) or strenuous exercise until two weeks after surgery.  Also, try to sleep with your head elevated.      Medications - Except blood thinners, almost all medication can be re-started after tonsillectomy.      Complications - Bleeding is the most common serious complication after tonsillectomy.  If there are a few small drops or streaks of blood in the saliva that then goes away, this can be watched.  Gentle gargling with the ice water can also help stop this minor bleeding.  However, if the bleeding is persistent, or heavy bleeding occurs, do not hesitate, go to the emergency room to be evaluated.    Follow up - I like to see my patient approximately 4 weeks after the procedure to make sure that everything has healed appropriately.     If there are any questions or issues with the above, or if there are other issues that concern you, always feel free to call the clinic and I am happy to speak with you as soon as I can.    Jb Nava MD   Otolaryngology  [facility]  [phone] or [phone]      After hours/ weekends call #[phone]    Instructions following Septoplasty    Recovery - Everyone recovers differently from a general anesthetic.  Symptoms such as fatigue, nausea, lightheadedness, and sometimes a low grade fever (up to 101 degrees) are not unusual.  As your body removes the anesthetic drugs from circulation, these symptoms will resolve.  Your nose will be sore after surgery, and you may even have symptoms similar to a sinus infection with headache, congestion, and pressure.  These will resolve with healing.  For several days you may experience bloody drainage from the nose, please use the drip pad as necessary for this. Call the office if the bleeding persists after 3 days or is perfuse. There are no diet restrictions after septoplasty, and you can resume your home medications except for blood thinners.  Please do not blow  your nose for two weeks after surgery. You may gently dab your nose with Kleenex. Limit your activity to no strenuous activities until I see you for the first follow up visit, and sleep with your head elevated.    Medications - You were sent home with narcotic pain medication.  If you can tolerate the discomfort during your recovery by using just plain Tylenol or ibuprofen (advil), please do so.  However, do not hesitate to use the stronger pain medication if needed.  If you were sent home with an antibiotic, it is primarily used to help the healing process.  If it causes loose bowel movements or other signs of intolerance, it is appropriate to discontinue it.      Complications - Problems related to septoplasty almost always are detected during the operation, and special instruction will be given in that situation.  However, unexpected things can happen.  If you experience persistent bleeding, fevers, changes in vision, severe headache or nasal pain, this may be the sign of an infection.  Any of these symptoms should be called into my office or to the on call ENT if after hours. There may be small plastic pieces placed inside your nose during surgery (splints). These help to promote the septum into healing in its straightened position, and will be removed at the follow up visit.    Follow up - Splints will be removed at the follow up visit. This is simple and takes just a few seconds afterwards, the improvement you will expereince in breathing from the septoplasty is usually dramatic and immediate.  I will then see you 4 to 6 weeks after that visit to make sure that everything has healed appropriately.    If there are any questions or issues with the above, or if there are other issues that concern you, always feel free to call the clinic and I am happy to speak with you as soon as I can.    Jb Nava MD  Otolaryngology  [facility]  [phone] or [phone] After hours, [facility]  option    [facility]  Same-Day Surgery   Adult Discharge Orders & Instructions   For 24 hours after surgery  1. Get plenty of rest.  A responsible adult must stay with you for at least 24 hours after you leave the hospital.   2. Do not drive or use heavy equipment.  If you have weakness or tingling, don't drive or use heavy equipment until this feeling goes away.  3. Do not drink alcohol.  4. Avoid strenuous or risky activities.  Ask for help when climbing stairs.   5. You may feel lightheaded.  IF so, sit for a few minutes before standing.  Have someone help you get up.   6. If you have nausea (feel sick to your stomach): Drink only clear liquids such as apple juice, ginger ale, broth or 7-Up.  Rest may also help.  Be sure to drink enough fluids.  Move to a regular diet as you feel able.  7. You may have a slight fever. Call the doctor if your fever is over 100 F (37.7 C) (taken under the tongue) or lasts longer than 24 hours.  8. You may have a dry mouth, a sore throat, muscle aches or trouble sleeping.  These should go away after 24 hours.  9. Do not make important or legal decisions.   Call your doctor for any of the followin.  Signs of infection (fever, growing tenderness at the surgery site, a large amount of drainage or bleeding, severe pain, foul-smelling drainage, redness, swelling).    2. It has been over 8 to 10 hours since surgery and you are still not able to urinate (pass water).    3.  Headache for over 24 hours.    4.  Numbness, tingling or weakness the day after surgery (if you had spinal anesthesia).  To contact a doctor, call ___________________________

## 2020-03-09 NOTE — OP NOTE
PREOPERATIVE DIAGNOSIS  1. Nasal obstruction.   2. Septal deviation.   3. Bilateral turbinate hypertrophy  4. Chronic tonsillitis  5. Tonsil and adenoid hypertrophy    POSTOPERATIVE DIAGNOSIS  1. Nasal obstruction.   2. Septal deviation.   3. Bilateral turbinate hypertrophy  4. Chronic tonsillitis  5. Tonsil and adenoid hypertrophy    PROCEDURES PERFORMED:   1. Septoplasty  2. Bilateral submucous resection of inferior turbinates.   3. Bilateral inferior turbinate out-fracture  4. Tonsillectomy, bilateral  5. Adenoidectomy    SURGEON: Jb Nava MD   ASSISTANTS: none  BLOOD LOSS: 20 mL.   COMPLICATIONS: None.   SPECIMENS: None.   ANESTHESIA: GETA.   DRAINS, IMPLANTS, and DEVICES: bilateral Botello splints    INDICATIONS: Zaida Lao presented to me with a history of chronic nasal obstruction. On evaluation, the patient had a deviated septum to the left and bilateral inferior turbinate hypertrophy. Therefore, my recommendation was for the above-named procedures. Preoperatively, risks discussed included the risks of infection, bleeding, the risks of general anesthesia, possible injury to the eyes, base of skull and tear duct system, and possible failure of the surgery to achieve the desired result, septal perforation, and need for revision. The patient understood these risks and possible outcomes and wished to proceed.   OPERATIVE PROCEDURE: After being taken to the operating room and induction of general endotracheal tube anesthesia, the bed was rotated 90 degrees. A procedural pause was performed to identify the patient by name, birthday, and procedure. The head was wrapped with towels. The patient was suspended with a McGyver mouth gag. I turned my attention to the tonsils and they were severely cryptic (endophytic). I grasped the left tonsil with an Allis forceps and retracted medially and performed dissection of the tonsil from the fossa utilizing monopolar cautery, and the left tonsil came out smoothly.  Some small blood vessels were cauterized. I then turned my attention to the right side, once again using an Allis forceps to grasp it and retract it medially. I performed dissection of the tonsil from the fossa, and the right tonsil also came out very smoothly.  I placed two small soft catheters through both nares out of the mouth to retract the soft palate forward. I inspected the adenoid with a laryngeal mirror and found a severely hypertrophic adenoid pad.  I used the suction cautery to perform adenoidectomy. Beginning in the center, I slowly made my way down the back wall of the nasopharynx from the choanae to the posterior pharyngeal wall and passavant's ridge. I removed adenoid tissue in between both torus tubarius. I removed all of the adenoid tissue. No trauma was made to the daniel.  I removed the catheters from the nose and mouth and reinspected the tonsil beds and there was good hemostasis. I cauterized any potential bleeders, irrigated, and valsalved the patient. Hemostasis was achieved. I suctioned the stomach with a flexible catheter.  After that, I began by applying topical anesthetic in the form of 2 cottonoids on each side of the nose which had been soaked with a total of 4 mL of 4% liquid cocaine. In addition, I injected 1% lidocaine with 1:100,000 epinephrine into the right hemitransfixion incision area, bilateral anterior septum, and overlying any spurs. She was then prepped and draped in the normal clean fashion.  I removed the cottonoids from the nose and entered the right nasal cavity.   I made a right hemitransfixion septoplasty incision in the right nasal vestibule in a traditional open fashion. I then dissected down onto the left side of the cartilaginous septum through the right-sided incision. I then was able to start a submucoperichondrial pocket directly on the left side of the cartilaginous septum. The patient had a deviated maxillary crest as well as the cartilage deflected to the left  "off the maxillary crest behind the caudal deflection creating a large spur posteriorly impacting the left inferior turbinate. After I completely elevated the mucoperichondrium off the left side of the septum and the bony spur, I continued posterior raising a flap off the bone. I then made a chondrotomy incision through the cartilage in a \"C\" shape fashion approximately 1.5 cm back from the anterior edge and 1.5 cm from the dorsum. I broke over to the right side and raised a submucoperiosteal flap on the entire right side of the nasal septum. After this was done, I freed the cartilage from the bony septum, and I removed it in one large piece. It was brought to the back table and carved free of the defelection for later reinserted back into the mucoperichondrial pocket. I then used a osteotome to remove the deflected maxillary crest on the left side. I also used a double-action scissors and removed a portion of the posterior bony septum by cutting superiorly leaving an adequate strut. Some of the bony septum was removed and discarded including the left bony spur. I placed the cartilage back in to the flaps. I laid the flaps back together and this significantly improved the nasal airway. I irrigated the septum in between the flaps. I then closed my septoplasty incision with 3 simple interrupted 4-0 chromic gut sutures. I closed a small tear in the mucosal flap over the spur on the left with 5-0 chromic suture.  I proceeded to the final components of the operation, which was submucous resection of inferior turbinates with out-fracture.  I injected both inferior turbinates along their entire length with 1% lidocaine, 1:100,000 epinephrine using a spinal needle. I used the 15 blade to make a stab incision at the head of the right inferior turbinate. I then used a caudal to free the mucoperiosteum from the inferior turbinate bone along the medial aspect of the bone all the way posteriorly. Using a 2.0 mm Infermed[employer]ver " blade, I slowly entered the pocket and ran the shaver function to perform my submucous resection of the right inferior turbinate. I used the shaver along the entire length of the inferior turbinate from anterior to posterior.   I then performed the same procedure on the left side. Once again using the 15 blade, I made a stab incision at the head of the left inferior turbinate.  I then used a caudal to free the mucoperiosteum from the inferior turbinate bone along the medial aspect of the bone all the way posteriorly. Using a 2.0 mm [employer] shaver blade, I slowly entered the pocket and ran the shaver function to perform my submucous resection of the left inferior turbinate. I used the shaver along the entire length of the inferior turbinate from anterior to posterior. I closed each stab incision with a simple interrupted 5-0 chromic suture.   Lastly I performed out-fracture by using the back end of the caudal to fracture both inferior turbinate bones laterally. I did this at multiple spots along each bone. I then used the longest nasal speculum to fracture the entire inferior turbinates laterally on both sides. The airway was now wide open on both sides.   Lastly, I placed a Botello stent on each side and stitched them together with a 3-0 nylon suture.  At this point, the entire procedure was now complete. I reinspected both sides of the nose and there was good hemostasis with a greatly improved airway bilaterally.  All instruments were accounted for and all counts were correct. The patient's bed was rotated 90 degrees back to the care of anesthesia. They were awakened, extubated and sent to the recovery room in good condition.

## 2020-03-09 NOTE — ANESTHESIA POSTPROCEDURE EVALUATION
Anesthesia POST Procedure Evaluation    Patient: Zaida Lao   MRN:     [id number] Gender:   female   Age:    45 year old :      1975        Preoperative Diagnosis: Nasal obstruction [J34.89]  Nasal septal deviation [J34.2]  Nasal turbinate hypertrophy [J34.3]  Adenoid hypertrophy [J35.2]  Chronic tonsillitis [J35.01]   Procedure(s):  SEPTOPLASTY, BILATERAL INFERIOR TURBINATE REDUCTION  BILATERAL TONSILLECTOMY AND ADENOIDECTOMY   Postop Comments: No value filed.     Anesthesia Type: General       Disposition: Outpatient   Postop Pain Control: Uneventful            Sign Out: Well controlled pain   PONV: No   Neuro/Psych: Uneventful            Sign Out: Acceptable/Baseline neuro status   Airway/Respiratory: Uneventful            Sign Out: Acceptable/Baseline resp. status   CV/Hemodynamics: Uneventful            Sign Out: Acceptable CV status   Other NRE: NONE   DID A NON-ROUTINE EVENT OCCUR? No         Last Anesthesia Record Vitals:  CRNA VITALS  3/9/2020 1109 - 3/9/2020 1209      3/9/2020             Pulse:  76    SpO2:  94 %    Resp Rate (observed):  17          Last PACU Vitals:  Vitals Value Taken Time   /79 3/9/2020 12:15 PM   Temp 97.4  F (36.3  C) 3/9/2020 11:40 AM   Pulse 86 3/9/2020 11:54 AM   Resp 10 3/9/2020 12:15 PM   SpO2 100 % 3/9/2020 12:15 PM   Temp src     NIBP     Pulse     SpO2     Resp     Temp     Ht Rate     Temp 2     Vitals shown include unvalidated device data.      Electronically Signed By: Maverick Blackmon DO, 2020, 2:31 PM

## 2020-03-09 NOTE — ANESTHESIA CARE TRANSFER NOTE
Patient: Zaida Lao    Procedure(s):  SEPTOPLASTY, BILATERAL INFERIOR TURBINATE REDUCTION  BILATERAL TONSILLECTOMY AND ADENOIDECTOMY    Diagnosis: Nasal obstruction [J34.89]  Nasal septal deviation [J34.2]  Nasal turbinate hypertrophy [J34.3]  Adenoid hypertrophy [J35.2]  Chronic tonsillitis [J35.01]  Diagnosis Additional Information: No value filed.    Anesthesia Type:   General     Note:  Anesthesia Care Transfer Notewriter    Vitals: (Last set prior to Anesthesia Care Transfer)    CRNA VITALS  3/9/2020 1109 - 3/9/2020 1146      3/9/2020             Pulse:  76    SpO2:  94 %    Resp Rate (observed):  17          Awake, comfortable, sats 99%< Report to RN.      Electronically Signed By: REBEKAH Archer CRNA  March 9, 2020  11:46 AM

## 2020-03-09 NOTE — ANESTHESIA PREPROCEDURE EVALUATION
"Anesthesia Pre-Procedure Evaluation    Patient: Zaida Lao   MRN:     [id number] Gender:   female   Age:    45 year old :      1975        Preoperative Diagnosis: Nasal obstruction [J34.89]  Nasal septal deviation [J34.2]  Nasal turbinate hypertrophy [J34.3]  Adenoid hypertrophy [J35.2]  Chronic tonsillitis [J35.01]   Procedure(s):  SEPTOPLASTY, BILATERAL INFERIOR TURBINATE REDUCTION  BILATERAL TONSILLECTOMY AND ADENOIDECTOMY     LABS:  CBC:   Lab Results   Component Value Date    WBC 10.3 2014    WBC 10.2 2014    HGB 9.3 (L) 07/15/2014    HGB 12.0 2014    HCT 34.7 (L) 2014    HCT 35.5 2014     2014     2014     BMP:   Lab Results   Component Value Date     2017    POTASSIUM 4.3 2017    CHLORIDE 106 2017    CO2 29 2017    BUN 11 2017    CR 0.72 2017    CR 0.60 2014    GLC 86 2017     (H) 2014     COAGS: No results found for: PTT, INR, FIBR  POC:   Lab Results   Component Value Date    HCG Negative 10/25/2011     OTHER:   Lab Results   Component Value Date    CJ 8.9 2017    ALT 19 2014    AST 23 2014    TSH 1.19 2013        Preop Vitals    BP Readings from Last 3 Encounters:   20 130/88   19 126/84   19 (!) 131/92    Pulse Readings from Last 3 Encounters:   20 79   19 98   19 85      Resp Readings from Last 3 Encounters:   20 16   08/10/18 20   14 16    SpO2 Readings from Last 3 Encounters:   20 98%   19 97%   08/10/18 98%      Temp Readings from Last 1 Encounters:   20 98.2  F (36.8  C) (Oral)    Ht Readings from Last 1 Encounters:   20 1.689 m (5' 6.5\")      Wt Readings from Last 1 Encounters:   20 111.6 kg (246 lb)    Estimated body mass index is 39.11 kg/m  as calculated from the following:    Height as of 20: 1.689 m (5' 6.5\").    Weight as of 20: 111.6 kg (246 " lb).     LDA:        Past Medical History:   Diagnosis Date     Human papillomavirus in conditions classified elsewhere and of unspecified site     genital warts , resolved     Seasonal allergies     Zyrtec helps and has an Rx for Flonase      Past Surgical History:   Procedure Laterality Date      SECTION  2011    Procedure: SECTION; Surgeon:CHRISSY BRITTON; Location:[facility]      SECTION  2014    Procedure:  SECTION;  Surgeon: Chrissy Britton MD;  Location: [facility]     GYN SURGERY      tubal ligation     HC TOOTH EXTRACTION W/FORCEP      one dry socket     RW DENTAL (ABSTRACTED)       SURGICAL HISTORY OF -       lithotripsy      Allergies   Allergen Reactions     Seasonal Allergies      Cold symptoms, stuffy nose, itchy throat and eyes     Sulfa Drugs      not effective        Anesthesia Evaluation     . Pt has had prior anesthetic. Type: Regional    No history of anesthetic complications          ROS/MED HX    ENT/Pulmonary: Comment: Nasal obstruction   Nasal septal deviation   Nasal turbinate hypertrophy   Adenoid hypertrophy   Chronic tonsillitis         (+)sleep apnea, , . .    Neurologic:  - neg neurologic ROS     Cardiovascular:     (+) hypertension----. : . . . :. .       METS/Exercise Tolerance:     Hematologic:  - neg hematologic  ROS       Musculoskeletal:  - neg musculoskeletal ROS       GI/Hepatic:  - neg GI/hepatic ROS       Renal/Genitourinary:     (+) Nephrolithiasis ,       Endo:     (+) Obesity, .      Psychiatric:  - neg psychiatric ROS       Infectious Disease:  - neg infectious disease ROS       Malignancy:      - no malignancy   Other:                         PHYSICAL EXAM:   Mental Status/Neuro: A/A/O   Airway: Facies: Feasible  Mallampati: II  Mouth/Opening: Full  TM distance: > 6 cm  Neck ROM: Full   Respiratory: Auscultation: CTAB     Resp. Rate: Normal     Resp. Effort: Normal      CV: Rhythm: Regular  Rate: Age  appropriate  Heart: Normal Sounds  Edema: None   Comments:      Dental: Normal Dentition                Assessment:   ASA SCORE: 2    H&P: History and physical reviewed and following examination; no interval change.   Smoking Status:  Non-Smoker/Unknown   NPO Status: NPO Appropriate     Plan:   Anes. Type:  General   Pre-Medication: Acetaminophen; Gabapentin   Induction:  IV (Standard)   Airway: ETT; Oral   Access/Monitoring: PIV   Maintenance: Balanced     Postop Plan:   Postop Pain: Opioids  Postop Sedation/Airway: Not planned  Disposition: Outpatient     PONV Management:   Adult Risk Factors: Female, Non-Smoker, Postop Opioids   Prevention: Ondansetron, Dexamethasone     CONSENT: Direct conversation   Plan and risks discussed with: Patient   Blood Products: Consent Deferred (Minimal Blood Loss)                   Maverick Blackmon DO

## 2020-03-12 LAB — COPATH REPORT: NORMAL

## 2020-03-13 ENCOUNTER — TELEPHONE (OUTPATIENT)
Dept: OTOLARYNGOLOGY | Facility: CLINIC | Age: 45
End: 2020-03-13

## 2020-03-13 NOTE — TELEPHONE ENCOUNTER
Reason for call:  Other   Patient called regarding (reason for call): call back    Additional comments: Patient's  calling stating that there is still oozing after the septoplasty procedure and is wondering if they should be going in to get that looked at. Please call to advise.     Phone number to reach patient:  Other phone number:  [phone]    Best Time:  Any     Can we leave a detailed message on this number?  YES    Travel screening: Not Applicable

## 2020-03-13 NOTE — TELEPHONE ENCOUNTER
Patient s/p nasal surgery 3/9/20. Patient and  calling reporting small intermittent gelatinous blood clots still coming from right nose when removing gauze nasal pad. Denies large clots. No persistent bleeding. No s/s of infection. Wondering if this is normal as discharge sheets say bleeding should stop after 3 days.  RN discussed that this is not uncommon at this time frame and to let us know if patient develops persistent bleeding. Recommended keeping nasal pad underneath nose until clots stop. They verbalized understanding and have no additional questions.    Clint Castillo RN....3/13/2020 12:54 PM

## 2020-03-17 ENCOUNTER — TELEPHONE (OUTPATIENT)
Dept: FAMILY MEDICINE | Facility: CLINIC | Age: 45
End: 2020-03-17

## 2020-03-17 ENCOUNTER — OFFICE VISIT (OUTPATIENT)
Dept: OTOLARYNGOLOGY | Facility: CLINIC | Age: 45
End: 2020-03-17
Payer: COMMERCIAL

## 2020-03-17 VITALS
TEMPERATURE: 97.2 F | HEART RATE: 107 BPM | WEIGHT: 246 LBS | DIASTOLIC BLOOD PRESSURE: 123 MMHG | BODY MASS INDEX: 39.11 KG/M2 | SYSTOLIC BLOOD PRESSURE: 190 MMHG | OXYGEN SATURATION: 99 %

## 2020-03-17 DIAGNOSIS — Z98.890 S/P NASAL SEPTOPLASTY: Primary | ICD-10-CM

## 2020-03-17 DIAGNOSIS — Z90.89 S/P TONSILLECTOMY AND ADENOIDECTOMY: ICD-10-CM

## 2020-03-17 PROCEDURE — 99024 POSTOP FOLLOW-UP VISIT: CPT | Performed by: OTOLARYNGOLOGY

## 2020-03-17 NOTE — TELEPHONE ENCOUNTER
Pt presented to clinic for packing removal post op. She was asked to leave the building d/t international travel. Presenting no s/s at this time and past the incubation period. After speaking with MD, this is not elective and packing HAS to be removed. Pt was escorted by MA to room where packing could be removed.    Mariela Simon RN Specialty Triage 3/17/2020 2:16 PM

## 2020-03-17 NOTE — TELEPHONE ENCOUNTER
Patient states she has traveled to [place] (2/25/20200) and has been home since then .  Had surgery 3/9/2020.  Denies cough, no fever no respiratory issues. No exposure to COVID-19 positive people.        [phone]     Per Jamilah Griffith RN [facility] direct to [facility] specialty I am calling specialty scheduling at this time ([phone])

## 2020-03-17 NOTE — LETTER
3/17/2020         RE: Zaida Lao  [address]   [place] [address]        Dear Colleague,    Thank you for referring your patient, Zaida Lao, to the [facility]. Please see a copy of my visit note below.    History of Present Illness - Zaida Lao is a 45 year old female who is status post T&A, septoplasty and inferior turbinate reduction on 3/9/2020.  They had some bleeding for a few days, but that has stopped. They have congestion. Some throat pain. Overall going okay. No oral bleeding.    Exam -   General - The patient is in no distress.  Alert and oriented to person and place, answers questions and cooperates with examination appropriately.   Eyes - Extraocular movements intact.  Sclera were not icteric or injected, conjunctiva were pink and moist.  Nose - After removing the splints and debris with a suction, everything looks great.  The incision is well healed and the turbinates are well lateralized. The airway is wide open bilaterally. No sign of synechiae or infection. No hematoma or septal perforation.  Mouth -patient is bilateral healing tonsil eschars and soft weight eschars.  No clots.  She has some old dark bloody postnasal drainage from the nasal cavity.  No clots in the tonsillar fossa.    A/P - Zaida Lao has had a nice result from T&A, septoplasty and inferior turbinate reduction.  The position of the septum and nasal tip look good.  I will see the patient in one month for another follow up if needed.  I cautioned them to avoid any trauma to the nose, hard blowing, or twisting. They can gently irrigate with nasal saline 1-2 times daily, use nasal saline spray, and apply vaseline to the anterior septum as needed.  She is now cleared to return to work yet.  Following tonsillectomy she needs additional time off to fully heal.  She should also refrain from any vigorous activity or exercise for at least the next week.      Again, thank you  for allowing me to participate in the care of your patient.        Sincerely,        Jb Nava MD

## 2020-03-17 NOTE — PROGRESS NOTES
History of Present Illness - Zaida Lao is a 45 year old female who is status post T&A, septoplasty and inferior turbinate reduction on 3/9/2020.  They had some bleeding for a few days, but that has stopped. They have congestion. Some throat pain. Overall going okay. No oral bleeding.    Exam -   General - The patient is in no distress.  Alert and oriented to person and place, answers questions and cooperates with examination appropriately.   Eyes - Extraocular movements intact.  Sclera were not icteric or injected, conjunctiva were pink and moist.  Nose - After removing the splints and debris with a suction, everything looks great.  The incision is well healed and the turbinates are well lateralized. The airway is wide open bilaterally. No sign of synechiae or infection. No hematoma or septal perforation.  Mouth -patient is bilateral healing tonsil eschars and soft weight eschars.  No clots.  She has some old dark bloody postnasal drainage from the nasal cavity.  No clots in the tonsillar fossa.    A/P - Zaida Lao has had a nice result from T&A, septoplasty and inferior turbinate reduction.  The position of the septum and nasal tip look good.  I will see the patient in one month for another follow up if needed.  I cautioned them to avoid any trauma to the nose, hard blowing, or twisting. They can gently irrigate with nasal saline 1-2 times daily, use nasal saline spray, and apply vaseline to the anterior septum as needed.  She is now cleared to return to work yet.  Following tonsillectomy she needs additional time off to fully heal.  She should also refrain from any vigorous activity or exercise for at least the next week.

## 2020-03-17 NOTE — TELEPHONE ENCOUNTER
Patient is at clinic for follow up appointment with Dr Nava after surgery is to have some stents removed from nose patient states. She was sent out of line due to she has traveled out of country prior to procedure. Sending caller to triage.

## 2020-04-02 ENCOUNTER — TELEPHONE (OUTPATIENT)
Dept: OTOLARYNGOLOGY | Facility: CLINIC | Age: 45
End: 2020-04-02

## 2020-04-02 NOTE — TELEPHONE ENCOUNTER
Called pt to change telephone visit to video. waiting call back    Mariela Simon RN Specialty Triage 4/2/2020 10:15 AM

## 2020-04-03 ENCOUNTER — VIRTUAL VISIT (OUTPATIENT)
Dept: OTOLARYNGOLOGY | Facility: CLINIC | Age: 45
End: 2020-04-03
Payer: COMMERCIAL

## 2020-04-03 DIAGNOSIS — Z98.890 S/P NASAL SEPTOPLASTY: ICD-10-CM

## 2020-04-03 DIAGNOSIS — Z90.89 S/P TONSILLECTOMY AND ADENOIDECTOMY: Primary | ICD-10-CM

## 2020-04-03 PROCEDURE — 99024 POSTOP FOLLOW-UP VISIT: CPT | Performed by: OTOLARYNGOLOGY

## 2020-04-03 NOTE — PROGRESS NOTES
History of Present Illness - Zaida Lao is a 45 year old female who is status post T&A, septoplasty and inferior turbinate reduction on 3/9/2020. I am speaking to her via telephone due to the coronavirus pandemic. She states there has been no oral or nasal bleeding. She is breathing much better. Snoring is improved. Sleep study from 2019 showed an AHI of 2 with lowest oxygen saturation of 85%. Average ox sat was 93%. She is not using her CPAP and feels she doesn't need it.      A/P - Zaida Lao has had a nice result from T&A, septoplasty and inferior turbinate reduction. She is breathing much better. She is back to a normal diet. No bleeding. She can follow-up as needed. She will discuss her sleep study and CPAP with the sleep center.     Telephone call 5 min.

## 2020-04-03 NOTE — PROGRESS NOTES
"Zaida Lao is a 45 year old female who is being evaluated via a billable telephone visit.      The patient has been notified of following:     \"This telephone visit will be conducted via a call between you and your physician/provider. We have found that certain health care needs can be provided without the need for a physical exam.  This service lets us provide the care you need with a short phone conversation.  If a prescription is necessary we can send it directly to your pharmacy.  If lab work is needed we can place an order for that and you can then stop by our lab to have the test done at a later time.    If during the course of the call the physician/provider feels a telephone visit is not appropriate, you will not be charged for this service.\"     Patient has given verbal consent for Telephone visit?  Yes    Zaida Lao complains of    Chief Complaint   Patient presents with     Follow Up      T&A, septoplasty and inferior turbinate reduction on 3/9/2020.        I have reviewed and updated the patient's Past Medical History, Social History, Family History and Medication List.    ALLERGIES  Seasonal allergies and Sulfa drugs    Tomi Dickerson MA    "

## 2020-05-14 DIAGNOSIS — F41.9 ANXIETY: ICD-10-CM

## 2020-05-14 RX ORDER — ESCITALOPRAM OXALATE 20 MG/1
TABLET ORAL
Qty: 90 TABLET | Refills: 1 | Status: SHIPPED | OUTPATIENT
Start: 2020-05-14 | End: 2020-11-02

## 2020-08-13 ENCOUNTER — E-VISIT (OUTPATIENT)
Dept: FAMILY MEDICINE | Facility: CLINIC | Age: 45
End: 2020-08-13
Payer: COMMERCIAL

## 2020-08-13 DIAGNOSIS — R05.9 COUGH: Primary | ICD-10-CM

## 2020-08-13 PROCEDURE — 99207 ZZC NON-BILLABLE SERV PER CHARTING: CPT | Performed by: PHYSICIAN ASSISTANT

## 2020-08-14 ENCOUNTER — OFFICE VISIT (OUTPATIENT)
Dept: FAMILY MEDICINE | Facility: CLINIC | Age: 45
End: 2020-08-14
Payer: COMMERCIAL

## 2020-08-14 DIAGNOSIS — R06.2 WHEEZING: Primary | ICD-10-CM

## 2020-08-14 DIAGNOSIS — R05.9 COUGH: ICD-10-CM

## 2020-08-14 PROCEDURE — 99214 OFFICE O/P EST MOD 30 MIN: CPT | Performed by: FAMILY MEDICINE

## 2020-08-14 PROCEDURE — U0003 INFECTIOUS AGENT DETECTION BY NUCLEIC ACID (DNA OR RNA); SEVERE ACUTE RESPIRATORY SYNDROME CORONAVIRUS 2 (SARS-COV-2) (CORONAVIRUS DISEASE [COVID-19]), AMPLIFIED PROBE TECHNIQUE, MAKING USE OF HIGH THROUGHPUT TECHNOLOGIES AS DESCRIBED BY CMS-2020-01-R: HCPCS | Performed by: FAMILY MEDICINE

## 2020-08-14 RX ORDER — ALBUTEROL SULFATE 90 UG/1
1-2 AEROSOL, METERED RESPIRATORY (INHALATION) EVERY 6 HOURS
Qty: 1 INHALER | Refills: 0 | Status: SHIPPED | OUTPATIENT
Start: 2020-08-14 | End: 2020-09-01

## 2020-08-14 RX ORDER — PREDNISONE 20 MG/1
20 TABLET ORAL DAILY PRN
Qty: 5 TABLET | Refills: 0 | Status: SHIPPED | OUTPATIENT
Start: 2020-08-14 | End: 2020-11-13

## 2020-08-14 RX ORDER — CODEINE PHOSPHATE AND GUAIFENESIN 10; 100 MG/5ML; MG/5ML
1 SOLUTION ORAL EVERY 4 HOURS PRN
Qty: 4 OZ | Refills: 0 | Status: SHIPPED | OUTPATIENT
Start: 2020-08-14 | End: 2020-11-13

## 2020-08-14 RX ORDER — DOXYCYCLINE HYCLATE 100 MG
100 TABLET ORAL 2 TIMES DAILY
Qty: 14 TABLET | Refills: 0 | Status: SHIPPED | OUTPATIENT
Start: 2020-08-14 | End: 2020-08-21

## 2020-08-14 NOTE — PROGRESS NOTES
SUBJECTIVE:  Zaida Lao, a 45 year old female scheduled an appointment to discuss the following cough.   Patient had negative covid testing 5 days ago in ER.   Sick past week. 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  Non-smoker. Had septoplathy/adenectomy in march.   No history asthma. Possible bronchitis in past and history of pneunonia in past.   No nausea, vomiting or diarrhea. Dry cough. Painful to cough. Over the counter nyquil/dayquil. Robitussin ac in past. MDI in past for bronchitis and prednisone in past with for allergies. Some body aches. Lost sense of smell/taste. Clear discharge. No fever. Fatigued. No chills. No rashes. No ear pain. Sinus pressure. No nausea, vomiting or diarrhea or urine changes. No std concerns. S/p tubal.   Medical, social, surgical, and family histories reviewed.    ROS:  All other ROS.    OBJECTIVE:  There were no vitals taken for this visit.   There were no vitals taken for this visit.   Temp 98.3. pulse 90 oxygen 98% HR 80 /82  EXAM:  GENERAL APPEARANCE: healthy, alert and no distress  EYES: EOMI,  PERRL  HENT: ear canals and TM's normal and nose thick discharge and mouth without ulcers or lesions  NECK: no adenopathy, no asymmetry, masses, or scars and thyroid normal to palpation  RESP: mild faint wheezing and upper airway rales. Good overall airflow and no crackles.   CV: regular rates and rhythm, normal S1 S2, no S3 or S4 and no murmur, click or rub -  ABDOMEN:  soft, nontender, no HSM or masses and bowel sounds normal  MS: extremities normal- no gross deformities noted, no evidence of inflammation in joints, FROM in all extremities.  SKIN: no suspicious lesions or rashes  NEURO: Normal strength and tone, sensory exam grossly normal, mentation intact and speech normal  PSYCH: mentation appears normal and affect normal/bright  PSYCH: mildly anxious    ASSESSMENT / PLAN:  (R06.2) Wheezing  (primary encounter diagnosis)  Comment:  faint. History bronchitis and prednisone/mdi helpful in past. Stats ok   Plan: predniSONE (DELTASONE) 20 MG tablet, albuterol         (PROAIR HFA/PROVENTIL HFA/VENTOLIN HFA) 108 (90        Base) MCG/ACT inhaler, Symptomatic COVID-19         Virus (Coronavirus) by PCR        HOLD prednisone -can take if not improving overall in next 3-4 days. Reveiwed risks and side effects of medication  To ER if worsening shortness of breath/high fevers/ chest pain/etc. Expected course and warning signs reviewed. Call/email with questions/concerns       (R05) Cough  Comment: possible covid vs bronchitis vs viral uri or sinus infection  Plan: doxycycline hyclate (VIBRA-TABS) 100 MG tablet,        predniSONE (DELTASONE) 20 MG tablet, albuterol         (PROAIR HFA/PROVENTIL HFA/VENTOLIN HFA) 108 (90        Base) MCG/ACT inhaler, Symptomatic COVID-19         Virus (Coronavirus) by PCR, guaiFENesin-codeine        (ROBITUSSIN AC) 100-10 MG/5ML solution        Not work until symptoms better or after 14 days from start of illness. To ER if worse. Return to clinic if symptoms not improving overall in next week. Expected course and warning signs reviewed. Call/email with questions/concerns. Reveiwed risks and side effects of medication    Addy Pleitez MD

## 2020-08-16 LAB
SARS-COV-2 RNA SPEC QL NAA+PROBE: NOT DETECTED
SPECIMEN SOURCE: NORMAL

## 2020-09-01 DIAGNOSIS — R05.9 COUGH: ICD-10-CM

## 2020-09-01 DIAGNOSIS — R06.2 WHEEZING: ICD-10-CM

## 2020-09-01 RX ORDER — ALBUTEROL SULFATE 90 UG/1
1-2 AEROSOL, METERED RESPIRATORY (INHALATION) EVERY 6 HOURS
Qty: 6.7 G | Refills: 1 | Status: SHIPPED | OUTPATIENT
Start: 2020-09-01 | End: 2020-11-13

## 2020-11-02 DIAGNOSIS — F41.9 ANXIETY: ICD-10-CM

## 2020-11-02 RX ORDER — ESCITALOPRAM OXALATE 20 MG/1
TABLET ORAL
Qty: 90 TABLET | Refills: 0 | Status: SHIPPED | OUTPATIENT
Start: 2020-11-02 | End: 2020-11-13

## 2020-11-12 ASSESSMENT — ENCOUNTER SYMPTOMS
NERVOUS/ANXIOUS: 1
SORE THROAT: 0
DIARRHEA: 0
NAUSEA: 0
ARTHRALGIAS: 1
HEARTBURN: 0
SHORTNESS OF BREATH: 0
EYE PAIN: 0
PALPITATIONS: 1
CONSTIPATION: 0
DYSURIA: 0
COUGH: 0
HEMATURIA: 0
JOINT SWELLING: 1
BREAST MASS: 0
MYALGIAS: 1
WEAKNESS: 0
CHILLS: 0
HEADACHES: 0
FEVER: 0
DIZZINESS: 0
HEMATOCHEZIA: 0
ABDOMINAL PAIN: 0
PARESTHESIAS: 0
FREQUENCY: 0

## 2020-11-12 NOTE — PROGRESS NOTES
SUBJECTIVE:   CC: Zaida Lao is an 45 year old woman who presents for preventive health visit.       Patient has been advised of split billing requirements and indicates understanding: Yes   Patient would still like to discuss the following concern(s):  1. Anxiety- has been on lexapro for years.  It is not working as it should be. Anxious, worrying all the time about things that she should not worry about.   2. Blood pressure- denies CP, SOB, headaches, dizziness  3. Heart-family history SVT     Healthy Habits:     Getting at least 3 servings of Calcium per day:  Yes    Bi-annual eye exam:  Yes    Dental care twice a year:  Yes    Sleep apnea or symptoms of sleep apnea:  None    Diet:  Regular (no restrictions)    Frequency of exercise:  2-3 days/week    Duration of exercise:  Greater than 60 minutes    Taking medications regularly:  Yes    Medication side effects:  None    PHQ-2 Total Score: 2    Additional concerns today:  Yes      Today's PHQ-2 Score:   PHQ-2 ( 1999 Pfizer) 11/12/2020   Q1: Little interest or pleasure in doing things 0   Q2: Feeling down, depressed or hopeless 2   PHQ-2 Score 2   Q1: Little interest or pleasure in doing things Not at all   Q2: Feeling down, depressed or hopeless More than half the days   PHQ-2 Score 2       Abuse: Current or Past (Physical, Sexual or Emotional) - No  Do you feel safe in your environment? Yes        Social History     Tobacco Use     Smoking status: Former Smoker     Packs/day: 0.00     Years: 10.00     Pack years: 0.00     Types: Cigarettes     Start date: 6/1/1994     Quit date: 1/30/2005     Years since quitting: 15.7     Smokeless tobacco: Never Used     Tobacco comment: Social smoker   Substance Use Topics     Alcohol use: Yes     Comment: Socially         Alcohol Use 11/12/2020   Prescreen: >3 drinks/day or >7 drinks/week? No   Prescreen: >3 drinks/day or >7 drinks/week? -       Reviewed orders with patient.  Reviewed health maintenance and  updated orders accordingly - Yes  Lab work is in process  Labs reviewed in EPIC  BP Readings from Last 3 Encounters:   20 (!) 171/117   20 (!) 190/123   20 132/76    Wt Readings from Last 3 Encounters:   20 120.1 kg (264 lb 12.8 oz)   20 111.6 kg (246 lb)   20 111.6 kg (246 lb)                  Patient Active Problem List   Diagnosis     Anxiety     CARDIOVASCULAR SCREENING; LDL GOAL LESS THAN 160     Seasonal allergies     Kidney stones     Bilateral occipital neuralgia     Nasal polyp     Hypertension, unspecified type     LOC (obstructive sleep apnea)     Insomnia, unspecified type     Nasal obstruction     Nasal septal deviation     Nasal turbinate hypertrophy     Adenoid hypertrophy     Chronic tonsillitis     Class 3 severe obesity with serious comorbidity and body mass index (BMI) of 40.0 to 44.9 in adult, unspecified obesity type (H)     Past Surgical History:   Procedure Laterality Date      SECTION  2011    Procedure: SECTION; Surgeon:CHRISSY BRITTON; [facility]      SECTION  2014    Procedure:  SECTION;  Surgeon: Chrissy Britton MD;  [facility]: [facility]     GYN SURGERY      tubal ligation     HC TOOTH EXTRACTION W/FORCEP      one dry socket     RW DENTAL (ABSTRACTED)       SEPTOPLASTY, TURBINOPLASTY, COMBINED Bilateral 3/9/2020    Procedure: SEPTOPLASTY, BILATERAL INFERIOR TURBINATE REDUCTION;  Surgeon: Jb Nvaa MD;  Location:  OR     SURGICAL HISTORY OF -       lithotripsy     TONSILLECTOMY, ADENOIDECTOMY, COMBINED Bilateral 3/9/2020    Procedure: BILATERAL TONSILLECTOMY AND ADENOIDECTOMY;  Surgeon: Jb Nava MD;  Location:  OR       Social History     Tobacco Use     Smoking status: Former Smoker     Packs/day: 0.00     Years: 10.00     Pack years: 0.00     Types: Cigarettes     Start date: 1994     Quit date: 2005     Years since quitting: 15.7     Smokeless tobacco:  Never Used     Tobacco comment: Social smoker   Substance Use Topics     Alcohol use: Yes     Comment: Socially     Family History   Problem Relation Age of Onset     C.A.D. Paternal Grandfather          of MI     Breast Cancer Maternal Grandmother              Cancer - colorectal Maternal Grandmother      Circulatory Maternal Grandmother         aneurism     Prostate Cancer Maternal Grandfather              Eye Surgery Maternal Grandfather         cataracts, late in life     Eye Disorder Father         detached retina     Genitourinary Problems Father         kidney stones     Macular Degeneration Father      Cerebrovascular Disease Paternal Grandmother              Glaucoma No family hx of          Current Outpatient Medications   Medication Sig Dispense Refill     albuterol (PROAIR HFA/PROVENTIL HFA/VENTOLIN HFA) 108 (90 Base) MCG/ACT inhaler Inhale 1-2 puffs into the lungs every 6 hours 6.7 g 1     ALPRAZolam (XANAX) 0.25 MG tablet Take 1 tablet (0.25 mg) by mouth daily as needed for anxiety Do not mix with alcohol or drive after taking 30 tablet 0     escitalopram (LEXAPRO) 10 MG tablet Take 1 tablet (10 mg) by mouth daily for 4 days, THEN 0.5 tablets (5 mg) daily for 4 days. 6 tablet 0     fluticasone (FLONASE) 50 MCG/ACT spray Spray 1 spray into both nostrils daily       hydrOXYzine (ATARAX) 25 MG tablet Take 1-2 tablets (25-50 mg) by mouth nightly as needed for itching 30 tablet 1     lisinopril (ZESTRIL) 5 MG tablet Take 1 tablet (5 mg) by mouth daily 90 tablet 1     sertraline (ZOLOFT) 50 MG tablet Take 0.5 tablets (25 mg) by mouth daily for 7 days, THEN 1 tablet (50 mg) daily for 7 days, THEN 2 tablets (100 mg) daily. 71 tablet 1     Allergies   Allergen Reactions     Seasonal Allergies      Cold symptoms, stuffy nose, itchy throat and eyes     Sulfa Drugs      not effective     Recent Labs   Lab Test 17  1108 14  1030 14  1028 14  1313 13  0188  13  1745   LDL 97  --   --   --   --  104   HDL 55  --   --   --   --  50   TRIG 128  --   --   --   --  104   ALT  --  19 18 15   < >  --    CR 0.72  --   --  0.60   < >  --    GFRESTIMATED >90  Non  GFR Calc    --   --  >90   < >  --    GFRESTBLACK >90   GFR Calc    --   --  >90   < >  --    POTASSIUM 4.3  --   --   --   --   --    TSH  --   --   --   --   --  1.19    < > = values in this interval not displayed.        Mammogram Screening: Patient under age 50, mutual decision reflected in health maintenance.      Pertinent mammograms are reviewed under the imaging tab.  History of abnormal Pap smear: NO - age 30-65 PAP every 5 years with negative HPV co-testing recommended  PAP / HPV Latest Ref Rng & Units 2017 1/10/2014 2011   PAP - NIL NIL NIL   HPV 16 DNA NEG Negative - -   HPV 18 DNA NEG Negative - -   OTHER HR HPV NEG Negative - -     Reviewed and updated as needed this visit by clinical staff  Tobacco  Allergies  Meds   Med Hx  Surg Hx  Fam Hx  Soc Hx        Reviewed and updated as needed this visit by Provider                Past Medical History:   Diagnosis Date     Depressive disorder     I take Lexapro     HTN (hypertension)      Human papillomavirus in conditions classified elsewhere and of unspecified site     genital warts , resolved     Mild or unspecified pre-eclampsia, antepartum      Pre-eclampsia      Seasonal allergies     Zyrtec helps and has an Rx for Flonase      Past Surgical History:   Procedure Laterality Date      SECTION  2011    Procedure: SECTION; Surgeon:CHRISSY BRITTON; [facility]      SECTION  2014    Procedure:  SECTION;  Surgeon: Chrissy Britton MD;  [facility]     GYN SURGERY      tubal ligation     HC TOOTH EXTRACTION W/FORCEP      one dry socket     RW DENTAL (ABSTRACTED)       SEPTOPLASTY, TURBINOPLASTY, COMBINED Bilateral 3/9/2020     "Procedure: SEPTOPLASTY, BILATERAL INFERIOR TURBINATE REDUCTION;  Surgeon: Jb Nava MD;  Location: [facility]     SURGICAL HISTORY OF -       lithotripsy     TONSILLECTOMY, ADENOIDECTOMY, COMBINED Bilateral 3/9/2020    Procedure: BILATERAL TONSILLECTOMY AND ADENOIDECTOMY;  Surgeon: Jb Nava MD;  Location: [facility] OR     OB History    Para Term  AB Living   2 2 2 0 0 2   SAB TAB Ectopic Multiple Live Births   0 0 0 0 2      # Outcome Date GA Lbr Hema/2nd Weight Sex Delivery Anes PTL Lv   2 Term 14 37w1d  3.26 kg (7 lb 3 oz) F   N SAMANTHA      Birth Comments: Passed hearing and oximetry screens    Hepatitis B vaccine    Bilirubin fine in hospital - high middle      Name: ROBERT SWANSON1 ISELA HERNANDEZ      Apgar1: 8  Apgar5: 8   1 Term 11 38w1d  3.118 kg (6 lb 14 oz) M CS-LTranv  N SAMANTHA      Name: ALEX HERNÁNDEZ      Apgar1: 9  Apgar5: 9       Review of Systems  CONSTITUTIONAL: NEGATIVE for fever, chills, change in weight  INTEGUMENTARU/SKIN: NEGATIVE for worrisome rashes, moles or lesions  EYES: NEGATIVE for vision changes or irritation  ENT: NEGATIVE for ear, mouth and throat problems  RESP: NEGATIVE for significant cough or SOB  BREAST: NEGATIVE for masses, tenderness or discharge  CV: NEGATIVE for chest pain, palpitations or peripheral edema  GI: NEGATIVE for nausea, abdominal pain, heartburn, or change in bowel habits  : NEGATIVE for unusual urinary or vaginal symptoms. Periods are regular.  MUSCULOSKELETAL: NEGATIVE for significant arthralgias or myalgia  NEURO: NEGATIVE for weakness, dizziness or paresthesias  ENDOCRINE: NEGATIVE for temperature intolerance, skin/hair changes  HEME/ALLERGY/IMMUNE: NEGATIVE for bleeding problems  PSYCHIATRIC: POSITIVE for anxiety     OBJECTIVE:   BP (!) 171/117   Pulse 99   Temp 97.1  F (36.2  C) (Tympanic)   Resp 16   Ht 1.688 m (5' 6.44\")   Wt 120.1 kg (264 lb 12.8 oz)   LMP 10/21/2020   SpO2 99%   BMI 42.18 kg/m    Physical " Exam  GENERAL: healthy, alert and no distress  EYES: Eyes grossly normal to inspection, PERRL and conjunctivae and sclerae normal  HENT: ear canals and TM's normal, nose and mouth without ulcers or lesions  NECK: no adenopathy, no asymmetry, masses, or scars and thyroid normal to palpation  RESP: lungs clear to auscultation - no rales, rhonchi or wheezes  BREAST: deferred per protocol  CV: regular rate and rhythm, normal S1 S2, no S3 or S4, no murmur, click or rub, no peripheral edema and peripheral pulses strong  ABDOMEN: soft, nontender, no hepatosplenomegaly, no masses and bowel sounds normal   (female): normal female external genitalia, normal urethral meatus, vaginal mucosa pink, moist, well rugated, and normal cervix/adnexa/uterus without masses or discharge  MS: no gross musculoskeletal defects noted, no edema, no cva tenderness  SKIN: no suspicious lesions or rashes  NEURO: Normal strength and tone, cranial nerves intact, mentation intact and speech normal  PSYCH: mentation appears normal, affect normal/bright  LYMPH: no cervical, supraclavicular, axillary, or inguinal adenopathy    Diagnostic Test Results:  Labs reviewed in Epic  See orders    ASSESSMENT/PLAN:       ICD-10-CM    1. Encounter for general adult medical examination with abnormal findings  Z00.01    2. Anxiety  F41.9 sertraline (ZOLOFT) 50 MG tablet     escitalopram (LEXAPRO) 10 MG tablet     hydrOXYzine (ATARAX) 25 MG tablet   3. Encounter for hepatitis C screening test for low risk patient  Z11.59 Hepatitis C Screen Reflex to HCV RNA Quant and Genotype   4. Screening for human immunodeficiency virus without presence of risk factors  Z11.4 HIV Antigen Antibody Combo   5. Screening for malignant neoplasm of cervix  Z12.4 Pap imaged thin layer screen with HPV - recommended age 30 - 65 years (select HPV order below)   6. Special screening examination for human papillomavirus (HPV)  Z11.51 HPV High Risk Types DNA Cervical   7. Lipid screening   "Z13.220 Lipid panel reflex to direct LDL Fasting   8. Screening for thyroid disorder  Z13.29 TSH with free T4 reflex   9. Screening for diabetes mellitus  Z13.1 Glucose   10. Hypertension, unspecified type  I10 lisinopril (ZESTRIL) 5 MG tablet   11. Encounter for screening mammogram for breast cancer  Z12.31 *MA Screening Digital Bilateral   12. Anxiety attack  F41.0 ALPRAZolam (XANAX) 0.25 MG tablet   13. Wheezing  R06.2 albuterol (PROAIR HFA/PROVENTIL HFA/VENTOLIN HFA) 108 (90 Base) MCG/ACT inhaler   14. Cough  R05 albuterol (PROAIR HFA/PROVENTIL HFA/VENTOLIN HFA) 108 (90 Base) MCG/ACT inhaler   15. Insomnia, unspecified type  G47.00 hydrOXYzine (ATARAX) 25 MG tablet   16. Class 3 severe obesity with serious comorbidity and body mass index (BMI) of 40.0 to 44.9 in adult, unspecified obesity type (H)  E66.01     Z68.41        Patient has been advised of split billing requirements and indicates understanding: Yes  COUNSELING:  Reviewed preventive health counseling, as reflected in patient instructions    Estimated body mass index is 42.18 kg/m  as calculated from the following:    Height as of this encounter: 1.688 m (5' 6.44\").    Weight as of this encounter: 120.1 kg (264 lb 12.8 oz).    Weight management plan: Discussed healthy diet and exercise guidelines    She reports that she quit smoking about 15 years ago. Her smoking use included cigarettes. She started smoking about 26 years ago. She smoked 0.00 packs per day for 10.00 years. She has never used smokeless tobacco.      Counseling Resources:  ATP IV Guidelines  Pooled Cohorts Equation Calculator  Breast Cancer Risk Calculator  BRCA-Related Cancer Risk Assessment: FHS-7 Tool  FRAX Risk Assessment  ICSI Preventive Guidelines  Dietary Guidelines for Americans, 2010  USDA's MyPlate  ASA Prophylaxis  Lung CA Screening    SOPHIE Hernandes  [facility] CAROL  "

## 2020-11-13 ENCOUNTER — OFFICE VISIT (OUTPATIENT)
Dept: FAMILY MEDICINE | Facility: CLINIC | Age: 45
End: 2020-11-13
Payer: COMMERCIAL

## 2020-11-13 DIAGNOSIS — E66.813 CLASS 3 SEVERE OBESITY WITH SERIOUS COMORBIDITY AND BODY MASS INDEX (BMI) OF 40.0 TO 44.9 IN ADULT, UNSPECIFIED OBESITY TYPE (H): ICD-10-CM

## 2020-11-13 DIAGNOSIS — G47.00 INSOMNIA, UNSPECIFIED TYPE: ICD-10-CM

## 2020-11-13 DIAGNOSIS — Z13.29 SCREENING FOR THYROID DISORDER: ICD-10-CM

## 2020-11-13 DIAGNOSIS — E66.01 CLASS 3 SEVERE OBESITY WITH SERIOUS COMORBIDITY AND BODY MASS INDEX (BMI) OF 40.0 TO 44.9 IN ADULT, UNSPECIFIED OBESITY TYPE (H): ICD-10-CM

## 2020-11-13 DIAGNOSIS — Z12.4 SCREENING FOR MALIGNANT NEOPLASM OF CERVIX: ICD-10-CM

## 2020-11-13 DIAGNOSIS — Z12.31 ENCOUNTER FOR SCREENING MAMMOGRAM FOR BREAST CANCER: ICD-10-CM

## 2020-11-13 DIAGNOSIS — Z11.59 ENCOUNTER FOR HEPATITIS C SCREENING TEST FOR LOW RISK PATIENT: ICD-10-CM

## 2020-11-13 DIAGNOSIS — Z11.4 SCREENING FOR HUMAN IMMUNODEFICIENCY VIRUS WITHOUT PRESENCE OF RISK FACTORS: ICD-10-CM

## 2020-11-13 DIAGNOSIS — F41.0 ANXIETY ATTACK: ICD-10-CM

## 2020-11-13 DIAGNOSIS — Z13.1 SCREENING FOR DIABETES MELLITUS: ICD-10-CM

## 2020-11-13 DIAGNOSIS — Z00.01 ENCOUNTER FOR GENERAL ADULT MEDICAL EXAMINATION WITH ABNORMAL FINDINGS: Primary | ICD-10-CM

## 2020-11-13 DIAGNOSIS — Z11.51 SPECIAL SCREENING EXAMINATION FOR HUMAN PAPILLOMAVIRUS (HPV): ICD-10-CM

## 2020-11-13 DIAGNOSIS — R06.2 WHEEZING: ICD-10-CM

## 2020-11-13 DIAGNOSIS — Z13.220 LIPID SCREENING: ICD-10-CM

## 2020-11-13 DIAGNOSIS — R05.9 COUGH: ICD-10-CM

## 2020-11-13 DIAGNOSIS — F41.9 ANXIETY: ICD-10-CM

## 2020-11-13 DIAGNOSIS — I10 HYPERTENSION, UNSPECIFIED TYPE: ICD-10-CM

## 2020-11-13 LAB
CHOLEST SERPL-MCNC: 215 MG/DL
GLUCOSE SERPL-MCNC: 98 MG/DL (ref 70–99)
HDLC SERPL-MCNC: 56 MG/DL
HIV 1+2 AB+HIV1 P24 AG SERPL QL IA: NONREACTIVE
LDLC SERPL CALC-MCNC: 125 MG/DL
NONHDLC SERPL-MCNC: 159 MG/DL
TRIGL SERPL-MCNC: 168 MG/DL
TSH SERPL DL<=0.005 MIU/L-ACNC: 1.51 MU/L (ref 0.4–4)

## 2020-11-13 PROCEDURE — 99396 PREV VISIT EST AGE 40-64: CPT | Performed by: NURSE PRACTITIONER

## 2020-11-13 PROCEDURE — 80061 LIPID PANEL: CPT | Performed by: NURSE PRACTITIONER

## 2020-11-13 PROCEDURE — 36415 COLL VENOUS BLD VENIPUNCTURE: CPT | Performed by: NURSE PRACTITIONER

## 2020-11-13 PROCEDURE — 84443 ASSAY THYROID STIM HORMONE: CPT | Performed by: NURSE PRACTITIONER

## 2020-11-13 PROCEDURE — 87389 HIV-1 AG W/HIV-1&-2 AB AG IA: CPT | Performed by: NURSE PRACTITIONER

## 2020-11-13 PROCEDURE — 87624 HPV HI-RISK TYP POOLED RSLT: CPT | Performed by: NURSE PRACTITIONER

## 2020-11-13 PROCEDURE — 82947 ASSAY GLUCOSE BLOOD QUANT: CPT | Performed by: NURSE PRACTITIONER

## 2020-11-13 PROCEDURE — G0145 SCR C/V CYTO,THINLAYER,RESCR: HCPCS | Performed by: NURSE PRACTITIONER

## 2020-11-13 PROCEDURE — 86803 HEPATITIS C AB TEST: CPT | Performed by: NURSE PRACTITIONER

## 2020-11-13 PROCEDURE — 99214 OFFICE O/P EST MOD 30 MIN: CPT | Mod: 25 | Performed by: NURSE PRACTITIONER

## 2020-11-13 RX ORDER — ALPRAZOLAM 0.25 MG
0.25 TABLET ORAL DAILY PRN
Qty: 30 TABLET | Refills: 0 | Status: SHIPPED | OUTPATIENT
Start: 2020-11-13 | End: 2021-01-15

## 2020-11-13 RX ORDER — ESCITALOPRAM OXALATE 10 MG/1
TABLET ORAL
Qty: 6 TABLET | Refills: 0 | Status: SHIPPED | OUTPATIENT
Start: 2020-11-13 | End: 2021-01-15

## 2020-11-13 RX ORDER — HYDROXYZINE HYDROCHLORIDE 25 MG/1
25-50 TABLET, FILM COATED ORAL
Qty: 30 TABLET | Refills: 1 | Status: SHIPPED | OUTPATIENT
Start: 2020-11-13 | End: 2020-11-20

## 2020-11-13 RX ORDER — ALBUTEROL SULFATE 90 UG/1
1-2 AEROSOL, METERED RESPIRATORY (INHALATION) EVERY 6 HOURS
Qty: 6.7 G | Refills: 1 | Status: SHIPPED | OUTPATIENT
Start: 2020-11-13 | End: 2021-09-24

## 2020-11-13 RX ORDER — LISINOPRIL 5 MG/1
5 TABLET ORAL DAILY
Qty: 90 TABLET | Refills: 1 | Status: SHIPPED | OUTPATIENT
Start: 2020-11-13 | End: 2021-05-04

## 2020-11-13 ASSESSMENT — ANXIETY QUESTIONNAIRES
3. WORRYING TOO MUCH ABOUT DIFFERENT THINGS: NEARLY EVERY DAY
7. FEELING AFRAID AS IF SOMETHING AWFUL MIGHT HAPPEN: MORE THAN HALF THE DAYS
5. BEING SO RESTLESS THAT IT IS HARD TO SIT STILL: NOT AT ALL
2. NOT BEING ABLE TO STOP OR CONTROL WORRYING: NEARLY EVERY DAY
IF YOU CHECKED OFF ANY PROBLEMS ON THIS QUESTIONNAIRE, HOW DIFFICULT HAVE THESE PROBLEMS MADE IT FOR YOU TO DO YOUR WORK, TAKE CARE OF THINGS AT HOME, OR GET ALONG WITH OTHER PEOPLE: NOT DIFFICULT AT ALL
6. BECOMING EASILY ANNOYED OR IRRITABLE: SEVERAL DAYS
GAD7 TOTAL SCORE: 15
1. FEELING NERVOUS, ANXIOUS, OR ON EDGE: NEARLY EVERY DAY

## 2020-11-13 ASSESSMENT — PATIENT HEALTH QUESTIONNAIRE - PHQ9
SUM OF ALL RESPONSES TO PHQ QUESTIONS 1-9: 5
5. POOR APPETITE OR OVEREATING: NEARLY EVERY DAY

## 2020-11-13 ASSESSMENT — MIFFLIN-ST. JEOR: SCORE: 1879.81

## 2020-11-14 ASSESSMENT — ANXIETY QUESTIONNAIRES: GAD7 TOTAL SCORE: 15

## 2020-11-16 LAB — HCV AB SERPL QL IA: NONREACTIVE

## 2020-11-17 LAB
COPATH REPORT: NORMAL
PAP: NORMAL

## 2020-11-17 NOTE — RESULT ENCOUNTER NOTE
Dewey Cerda,    Thank you for your recent office visit.    Here are your recent results.  Blood labs are considered normal.     Feel free to contact me via [employer] or call the clinic at [phone].    Sincerely,    REBEKAH Orozco, FNP-BC

## 2020-11-19 ENCOUNTER — PATIENT OUTREACH (OUTPATIENT)
Dept: FAMILY MEDICINE | Facility: CLINIC | Age: 45
End: 2020-11-19

## 2020-11-19 LAB
FINAL DIAGNOSIS: ABNORMAL
HPV HR 12 DNA CVX QL NAA+PROBE: POSITIVE
HPV16 DNA SPEC QL NAA+PROBE: NEGATIVE
HPV18 DNA SPEC QL NAA+PROBE: NEGATIVE
SPECIMEN DESCRIPTION: ABNORMAL
SPECIMEN SOURCE CVX/VAG CYTO: ABNORMAL

## 2020-11-20 DIAGNOSIS — F41.9 ANXIETY: ICD-10-CM

## 2020-11-20 DIAGNOSIS — G47.00 INSOMNIA, UNSPECIFIED TYPE: ICD-10-CM

## 2020-11-20 RX ORDER — HYDROXYZINE HYDROCHLORIDE 25 MG/1
25-50 TABLET, FILM COATED ORAL
Qty: 30 TABLET | Refills: 0 | Status: SHIPPED | OUTPATIENT
Start: 2020-11-20 | End: 2020-12-02

## 2020-11-30 ENCOUNTER — MYC MEDICAL ADVICE (OUTPATIENT)
Dept: FAMILY MEDICINE | Facility: CLINIC | Age: 45
End: 2020-11-30

## 2020-12-01 VITALS
HEIGHT: 66 IN | BODY MASS INDEX: 42.56 KG/M2 | OXYGEN SATURATION: 99 % | RESPIRATION RATE: 16 BRPM | TEMPERATURE: 97.1 F | WEIGHT: 264.8 LBS | HEART RATE: 99 BPM | DIASTOLIC BLOOD PRESSURE: 78 MMHG | SYSTOLIC BLOOD PRESSURE: 112 MMHG

## 2020-12-02 DIAGNOSIS — G47.00 INSOMNIA, UNSPECIFIED TYPE: ICD-10-CM

## 2020-12-02 DIAGNOSIS — F41.9 ANXIETY: ICD-10-CM

## 2020-12-02 RX ORDER — HYDROXYZINE HYDROCHLORIDE 25 MG/1
25-50 TABLET, FILM COATED ORAL
Qty: 30 TABLET | Refills: 0 | Status: SHIPPED | OUTPATIENT
Start: 2020-12-02 | End: 2020-12-02

## 2020-12-02 RX ORDER — HYDROXYZINE HYDROCHLORIDE 25 MG/1
25-50 TABLET, FILM COATED ORAL
Qty: 30 TABLET | Refills: 0 | Status: SHIPPED | OUTPATIENT
Start: 2020-12-02 | End: 2020-12-21

## 2021-01-02 DIAGNOSIS — G47.00 INSOMNIA, UNSPECIFIED TYPE: ICD-10-CM

## 2021-01-02 DIAGNOSIS — F41.9 ANXIETY: ICD-10-CM

## 2021-01-05 RX ORDER — HYDROXYZINE HYDROCHLORIDE 25 MG/1
25-50 TABLET, FILM COATED ORAL
Qty: 30 TABLET | Refills: 1 | Status: SHIPPED | OUTPATIENT
Start: 2021-01-05 | End: 2021-01-15

## 2021-01-05 NOTE — TELEPHONE ENCOUNTER
"Requested Prescriptions   Pending Prescriptions Disp Refills     hydrOXYzine (ATARAX) 25 MG tablet [Pharmacy Med Name: HYDROXYZINE HCL 25 MG TABLET] 30 tablet 0     Sig: TAKE 1-2 TABLETS (25-50 MG) BY MOUTH NIGHTLY AS NEEDED FOR ITCHING       Antihistamines Protocol Passed - 1/2/2021  7:30 AM        Passed - Recent (12 mo) or future (30 days) visit within the authorizing provider's specialty     Patient has had an office visit with the authorizing provider or a provider within the authorizing providers department within the previous 12 mos or has a future within next 30 days. See \"Patient Info\" tab in inbasket, or \"Choose Columns\" in Meds & Orders section of the refill encounter.              Passed - Patient is age 3 or older     Apply age and/or weight-based dosing for peds patients age 3 and older.    Forward request to provider for patients under the age of 3.          Passed - Medication is active on med list           Last OV 11/13/20  Prescription approved per [facility] Refill Protocol.  Rebecca OCONNELL-RN  Triage Nurse  [facility]: [facility]      "

## 2021-01-06 ENCOUNTER — TELEPHONE (OUTPATIENT)
Dept: FAMILY MEDICINE | Facility: CLINIC | Age: 46
End: 2021-01-06

## 2021-01-06 DIAGNOSIS — F43.23 ADJUSTMENT DISORDER WITH MIXED ANXIETY AND DEPRESSED MOOD: Primary | ICD-10-CM

## 2021-01-06 NOTE — TELEPHONE ENCOUNTER
Insurance will not cover 2 tablets of SERTRALINE/ day.  Please send RX for higher strength.  Thank you!

## 2021-01-06 NOTE — TELEPHONE ENCOUNTER
Routing refill request to provider for review/approval because:  Pended for 100 mg once daily.  Sertraline 5 mg, 2 tabs daily discontinued

## 2021-01-11 RX ORDER — SERTRALINE HYDROCHLORIDE 100 MG/1
100 TABLET, FILM COATED ORAL DAILY
Qty: 30 TABLET | Refills: 0 | Status: SHIPPED | OUTPATIENT
Start: 2021-01-11 | End: 2021-01-15

## 2021-01-13 DIAGNOSIS — G47.00 INSOMNIA, UNSPECIFIED TYPE: ICD-10-CM

## 2021-01-13 DIAGNOSIS — F41.9 ANXIETY: ICD-10-CM

## 2021-01-15 ENCOUNTER — VIRTUAL VISIT (OUTPATIENT)
Dept: FAMILY MEDICINE | Facility: CLINIC | Age: 46
End: 2021-01-15
Payer: COMMERCIAL

## 2021-01-15 DIAGNOSIS — F43.23 ADJUSTMENT DISORDER WITH MIXED ANXIETY AND DEPRESSED MOOD: ICD-10-CM

## 2021-01-15 DIAGNOSIS — F41.0 ANXIETY ATTACK: ICD-10-CM

## 2021-01-15 PROCEDURE — 99213 OFFICE O/P EST LOW 20 MIN: CPT | Mod: GT | Performed by: NURSE PRACTITIONER

## 2021-01-15 RX ORDER — ALPRAZOLAM 0.25 MG
0.25 TABLET ORAL DAILY PRN
Qty: 30 TABLET | Refills: 3 | Status: SHIPPED | OUTPATIENT
Start: 2021-01-15 | End: 2021-09-24

## 2021-01-15 RX ORDER — SERTRALINE HYDROCHLORIDE 100 MG/1
200 TABLET, FILM COATED ORAL DAILY
Qty: 180 TABLET | Refills: 1 | Status: SHIPPED | OUTPATIENT
Start: 2021-01-15 | End: 2021-07-16

## 2021-01-15 RX ORDER — HYDROXYZINE HYDROCHLORIDE 25 MG/1
25-50 TABLET, FILM COATED ORAL
Qty: 30 TABLET | Refills: 1 | Status: SHIPPED | OUTPATIENT
Start: 2021-01-15 | End: 2023-10-02

## 2021-01-15 ASSESSMENT — ANXIETY QUESTIONNAIRES
IF YOU CHECKED OFF ANY PROBLEMS ON THIS QUESTIONNAIRE, HOW DIFFICULT HAVE THESE PROBLEMS MADE IT FOR YOU TO DO YOUR WORK, TAKE CARE OF THINGS AT HOME, OR GET ALONG WITH OTHER PEOPLE: NOT DIFFICULT AT ALL
GAD7 TOTAL SCORE: 11
6. BECOMING EASILY ANNOYED OR IRRITABLE: MORE THAN HALF THE DAYS
3. WORRYING TOO MUCH ABOUT DIFFERENT THINGS: NEARLY EVERY DAY
7. FEELING AFRAID AS IF SOMETHING AWFUL MIGHT HAPPEN: MORE THAN HALF THE DAYS
2. NOT BEING ABLE TO STOP OR CONTROL WORRYING: MORE THAN HALF THE DAYS
5. BEING SO RESTLESS THAT IT IS HARD TO SIT STILL: NOT AT ALL
1. FEELING NERVOUS, ANXIOUS, OR ON EDGE: MORE THAN HALF THE DAYS

## 2021-01-15 ASSESSMENT — PATIENT HEALTH QUESTIONNAIRE - PHQ9
SUM OF ALL RESPONSES TO PHQ QUESTIONS 1-9: 2
5. POOR APPETITE OR OVEREATING: NOT AT ALL

## 2021-01-15 NOTE — PROGRESS NOTES
Zaida is a 46 year old who is being evaluated via a billable video visit.      How would you like to obtain your AVS? MyChart  If the video visit is dropped, the invitation should be resent by: Text to cell phone: [phone]  Will anyone else be joining your video visit? No    Video Start Time: 0925  Assessment & Plan     Anxiety attack    - ALPRAZolam (XANAX) 0.25 MG tablet; Take 1 tablet (0.25 mg) by mouth daily as needed for anxiety Do not mix with alcohol or drive after taking    Adjustment disorder with mixed anxiety and depressed mood    - sertraline (ZOLOFT) 100 MG tablet; Take 2 tablets (200 mg) by mouth daily      20 minutes spent on the date of the encounter doing chart review, history and exam, documentation and further activities as noted above       See Patient Instructions: advised to follow up sooner if symptoms worsen or if she has other health care questions or concerns.  Pt in agreement.     Return in about 6 months (around 7/15/2021), or if symptoms worsen or fail to improve.    SOPHIE Hernandes  [facility] CAROLSHAJI Cerda is a 46 year old who presents to clinic today for the following health issues     HPI       Depression and Anxiety Follow-Up    How are you doing with your depression since your last visit? Improved - she feels a lot better than previous, she feels her last antidepressant was not the right fit. Advised since she feels improvement, we will increase dose as this usually increases benefit.  If she felt better on 100 mg daily, she can always drop back down to that dose.     How are you doing with your anxiety since your last visit?  Improved     Are you having other symptoms that might be associated with depression or anxiety? No    Have you had a significant life event? No     Do you have any concerns with your use of alcohol or other drugs? No    Social History     Tobacco Use     Smoking status: Former Smoker     Packs/day: 0.00     Years: 10.00      Pack years: 0.00     Types: Cigarettes     Start date: 6/1/1994     Quit date: 1/30/2005     Years since quitting: 15.9     Smokeless tobacco: Never Used     Tobacco comment: Social smoker   Substance Use Topics     Alcohol use: Yes     Comment: Socially     Drug use: No     PHQ 2/21/2020 3/6/2020 11/13/2020   PHQ-9 Total Score 3 3 5   Q9: Thoughts of better off dead/self-harm past 2 weeks Not at all Not at all Not at all     ROMANA-7 SCORE 2/21/2020 3/6/2020 11/13/2020   Total Score - - -   Total Score 12 (moderate anxiety) 13 (moderate anxiety) -   Total Score 12 13 15   Total Score - - -     Last PHQ-9 1/15/2021   1.  Little interest or pleasure in doing things 0   2.  Feeling down, depressed, or hopeless 0   3.  Trouble falling or staying asleep, or sleeping too much 0   4.  Feeling tired or having little energy 0   5.  Poor appetite or overeating 2   6.  Feeling bad about yourself 0   7.  Trouble concentrating 0   8.  Moving slowly or restless 0   Q9: Thoughts of better off dead/self-harm past 2 weeks 0   PHQ-9 Total Score 2   Difficulty at work, home, or with people Not difficult at all     ROMANA-7  1/15/2021   1. Feeling nervous, anxious, or on edge 2   2. Not being able to stop or control worrying 2   3. Worrying too much about different things 3   4. Trouble relaxing 0   5. Being so restless that it is hard to sit still 0   6. Becoming easily annoyed or irritable 2   7. Feeling afraid, as if something awful might happen 2   ROMANA-7 Total Score 11   If you checked any problems, how difficult have they made it for you to do your work, take care of things at home, or get along with other people? Not difficult at all       Suicide Assessment Five-step Evaluation and Treatment (SAFE-T)      How many servings of fruits and vegetables do you eat daily?  4 or more    On average, how many sweetened beverages do you drink each day (Examples: soda, juice, sweet tea, etc.  Do NOT count diet or artificially sweetened  beverages)?   1    How many days per week do you exercise enough to make your heart beat faster? 3 or less    How many minutes a day do you exercise enough to make your heart beat faster? 30 - 60    How many days per week do you miss taking your medication? 0      Review of Systems   Constitutional, HEENT, cardiovascular, pulmonary, GI, , musculoskeletal, neuro, skin, endocrine and psych systems are negative, except as otherwise noted.      Objective           Vitals:  No vitals were obtained today due to virtual visit.    Physical Exam   GENERAL: Healthy, alert and no distress  EYES: Eyes grossly normal to inspection.  No discharge or erythema, or obvious scleral/conjunctival abnormalities.  RESP: No audible wheeze, cough, or visible cyanosis.  No visible retractions or increased work of breathing.    SKIN: Visible skin clear. No significant rash, abnormal pigmentation or lesions.  NEURO: Cranial nerves grossly intact.  Mentation and speech appropriate for age.  PSYCH: Mentation appears normal, affect normal/bright, judgement and insight intact, normal speech and appearance well-groomed.        Video-Visit Details    Type of service:  Video Visit    Video End Time:9:39 AM    Originating Location (pt. Location): Home    Distant Location (provider location):  [email or web address]     Platform used for Video Visit: iSentium

## 2021-01-15 NOTE — PATIENT INSTRUCTIONS
Patient Education        Return if you are having worsening thoughts/feelings of harming yourself or others, or any urgent/life-threatening concerns.     DISCHARGE RESOURCES:    formerly [facility] [phone]     [facility] [phone] (detox), [phone] (outpatient & [facility])      Crisis Intervention: [phone] or [phone] (TTY: 646.275.9551).  Call anytime.    Suicide Awareness Voices of Education (SAVE) (www.save.org): 337-772-VWGC (7183)    National Suicide Prevention Line (www.mentalhealthmn.org): 446-738-ZPRY (7656)    National [place] on Mental Illness (www.mn.jina.org): 176.162.8379 or 752-833-5382.    Xgmy6aqje: text the word LIFE to 93520 for immediate support and crisis intervention    Mental Health Consumer/Survivor Network of [place] (www.mhcsn.net): 793.512.1130 or 140-567-5587    Mental Health Association of [place] (www.mentalhealth.org): 702.392.1055 or 412-164-9937

## 2021-01-15 NOTE — TELEPHONE ENCOUNTER
Prescription approved per [facility].  Rosalie Chamberlain RN

## 2021-01-16 ASSESSMENT — ANXIETY QUESTIONNAIRES: GAD7 TOTAL SCORE: 11

## 2021-01-29 ENCOUNTER — IMMUNIZATION (OUTPATIENT)
Dept: NURSING | Facility: CLINIC | Age: 46
End: 2021-01-29
Payer: COMMERCIAL

## 2021-01-29 PROCEDURE — 91300 PR COVID VAC PFIZER DIL RECON 30 MCG/0.3 ML IM: CPT

## 2021-01-29 PROCEDURE — 0001A PR COVID VAC PFIZER DIL RECON 30 MCG/0.3 ML IM: CPT

## 2021-02-01 DIAGNOSIS — F41.9 ANXIETY: ICD-10-CM

## 2021-02-01 DIAGNOSIS — G47.00 INSOMNIA, UNSPECIFIED TYPE: ICD-10-CM

## 2021-02-01 RX ORDER — HYDROXYZINE HYDROCHLORIDE 25 MG/1
25-50 TABLET, FILM COATED ORAL
Qty: 30 TABLET | Refills: 1 | Status: CANCELLED | OUTPATIENT
Start: 2021-02-01

## 2021-02-07 ENCOUNTER — MYC MEDICAL ADVICE (OUTPATIENT)
Dept: FAMILY MEDICINE | Facility: CLINIC | Age: 46
End: 2021-02-07

## 2021-02-07 DIAGNOSIS — F43.23 ADJUSTMENT DISORDER WITH MIXED ANXIETY AND DEPRESSED MOOD: ICD-10-CM

## 2021-02-08 NOTE — TELEPHONE ENCOUNTER
Magdaleno message sent to patient, to transfer med to the pharmacy she would like it at.    Rebecca BSN-RN  Triage Nurse  [facility]: [facility]

## 2021-02-09 RX ORDER — SERTRALINE HYDROCHLORIDE 100 MG/1
TABLET, FILM COATED ORAL
Qty: 30 TABLET | OUTPATIENT
Start: 2021-02-09

## 2021-02-09 NOTE — TELEPHONE ENCOUNTER
Duplicate.  Refilled 1/15/21 at another [facility].  Rosemary Lundy RN  [facility]

## 2021-02-19 ENCOUNTER — IMMUNIZATION (OUTPATIENT)
Dept: NURSING | Facility: CLINIC | Age: 46
End: 2021-02-19
Attending: FAMILY MEDICINE
Payer: COMMERCIAL

## 2021-02-19 PROCEDURE — 0002A PR COVID VAC PFIZER DIL RECON 30 MCG/0.3 ML IM: CPT

## 2021-02-19 PROCEDURE — 91300 PR COVID VAC PFIZER DIL RECON 30 MCG/0.3 ML IM: CPT

## 2021-04-02 DIAGNOSIS — Z12.31 ENCOUNTER FOR SCREENING MAMMOGRAM FOR BREAST CANCER: ICD-10-CM

## 2021-04-02 PROCEDURE — 77063 BREAST TOMOSYNTHESIS BI: CPT | Mod: TC | Performed by: RADIOLOGY

## 2021-04-02 PROCEDURE — 77067 SCR MAMMO BI INCL CAD: CPT | Mod: TC | Performed by: RADIOLOGY

## 2021-04-30 DIAGNOSIS — I10 HYPERTENSION, UNSPECIFIED TYPE: ICD-10-CM

## 2021-04-30 NOTE — TELEPHONE ENCOUNTER
"Requested Prescriptions   Pending Prescriptions Disp Refills    lisinopril (ZESTRIL) 5 MG tablet 90 tablet 1     Sig: Take 1 tablet (5 mg) by mouth daily       ACE Inhibitors (Including Combos) Protocol Failed - 4/30/2021  9:08 AM        Failed - Normal serum creatinine on file in past 12 months     Recent Labs   Lab Test 06/30/17  1108   CR 0.72       Ok to refill medication if creatinine is low          Failed - Normal serum potassium on file in past 12 months     Recent Labs   Lab Test 06/30/17  1108   POTASSIUM 4.3             Passed - Blood pressure under 140/90 in past 12 months     BP Readings from Last 3 Encounters:   12/01/20 112/78   03/17/20 (!) 190/123   03/09/20 132/76                 Passed - Recent (12 mo) or future (30 days) visit within the authorizing provider's specialty     Patient has had an office visit with the authorizing provider or a provider within the authorizing providers department within the previous 12 mos or has a future within next 30 days. See \"Patient Info\" tab in inbasket, or \"Choose Columns\" in Meds & Orders section of the refill encounter.              Passed - Medication is active on med list        Passed - Patient is age 18 or older        Passed - No active pregnancy on record        Passed - No positive pregnancy test within past 12 months           Routing refill request to provider for review/approval because:  Failed protocol.  Rebecca SEWELLN-RN  [facility]    "

## 2021-05-04 RX ORDER — LISINOPRIL 5 MG/1
5 TABLET ORAL DAILY
Qty: 90 TABLET | Refills: 0 | Status: SHIPPED | OUTPATIENT
Start: 2021-05-04 | End: 2021-08-04

## 2021-07-16 DIAGNOSIS — F43.23 ADJUSTMENT DISORDER WITH MIXED ANXIETY AND DEPRESSED MOOD: ICD-10-CM

## 2021-07-16 RX ORDER — SERTRALINE HYDROCHLORIDE 100 MG/1
TABLET, FILM COATED ORAL
Qty: 60 TABLET | Refills: 0 | Status: SHIPPED | OUTPATIENT
Start: 2021-07-16 | End: 2021-08-10

## 2021-07-31 DIAGNOSIS — I10 HYPERTENSION, UNSPECIFIED TYPE: ICD-10-CM

## 2021-08-02 NOTE — TELEPHONE ENCOUNTER
Routing refill request to provider for review/approval because:  Labs not current:  creatinine and potassium    Creatinine   Date Value Ref Range Status   06/30/2017 0.72 0.52 - 1.04 mg/dL Final      Potassium   Date Value Ref Range Status   06/30/2017 4.3 3.4 - 5.3 mmol/L Final              Pending Prescriptions:                       Disp   Refills    lisinopril (ZESTRIL) 5 MG tablet [Pharmacy*90 tab*0        Sig: TAKE 1 TABLET BY MOUTH EVERY DAY        Jad Tilley RN

## 2021-08-04 RX ORDER — LISINOPRIL 5 MG/1
TABLET ORAL
Qty: 90 TABLET | Refills: 0 | Status: SHIPPED | OUTPATIENT
Start: 2021-08-04 | End: 2021-09-24

## 2021-08-08 DIAGNOSIS — F43.23 ADJUSTMENT DISORDER WITH MIXED ANXIETY AND DEPRESSED MOOD: ICD-10-CM

## 2021-08-10 RX ORDER — SERTRALINE HYDROCHLORIDE 100 MG/1
TABLET, FILM COATED ORAL
Qty: 60 TABLET | Refills: 0 | Status: SHIPPED | OUTPATIENT
Start: 2021-08-10 | End: 2021-09-07

## 2021-08-10 NOTE — TELEPHONE ENCOUNTER
Routing refill request to provider for review/approval because:  Myrtle given x1 and patient did not follow up, please advise

## 2021-09-05 DIAGNOSIS — F43.23 ADJUSTMENT DISORDER WITH MIXED ANXIETY AND DEPRESSED MOOD: ICD-10-CM

## 2021-09-07 RX ORDER — SERTRALINE HYDROCHLORIDE 100 MG/1
TABLET, FILM COATED ORAL
Qty: 60 TABLET | Refills: 0 | Status: SHIPPED | OUTPATIENT
Start: 2021-09-07 | End: 2021-09-24

## 2021-09-07 NOTE — TELEPHONE ENCOUNTER
Routing refill request to provider for review/approval because:  Labs not current:  phq9  Patient needs to be seen because:  Pt was due 7/15/21    Medication pended for approval, 30 day supply with reminder.   Pt has appt 9/24/21

## 2021-09-19 ENCOUNTER — HEALTH MAINTENANCE LETTER (OUTPATIENT)
Age: 46
End: 2021-09-19

## 2021-09-22 ASSESSMENT — ANXIETY QUESTIONNAIRES
5. BEING SO RESTLESS THAT IT IS HARD TO SIT STILL: NOT AT ALL
7. FEELING AFRAID AS IF SOMETHING AWFUL MIGHT HAPPEN: SEVERAL DAYS
2. NOT BEING ABLE TO STOP OR CONTROL WORRYING: NEARLY EVERY DAY
1. FEELING NERVOUS, ANXIOUS, OR ON EDGE: NEARLY EVERY DAY
GAD7 TOTAL SCORE: 14
3. WORRYING TOO MUCH ABOUT DIFFERENT THINGS: NEARLY EVERY DAY
7. FEELING AFRAID AS IF SOMETHING AWFUL MIGHT HAPPEN: SEVERAL DAYS
4. TROUBLE RELAXING: MORE THAN HALF THE DAYS
GAD7 TOTAL SCORE: 14
6. BECOMING EASILY ANNOYED OR IRRITABLE: MORE THAN HALF THE DAYS
GAD7 TOTAL SCORE: 14
8. IF YOU CHECKED OFF ANY PROBLEMS, HOW DIFFICULT HAVE THESE MADE IT FOR YOU TO DO YOUR WORK, TAKE CARE OF THINGS AT HOME, OR GET ALONG WITH OTHER PEOPLE?: NOT DIFFICULT AT ALL

## 2021-09-22 ASSESSMENT — PATIENT HEALTH QUESTIONNAIRE - PHQ9
10. IF YOU CHECKED OFF ANY PROBLEMS, HOW DIFFICULT HAVE THESE PROBLEMS MADE IT FOR YOU TO DO YOUR WORK, TAKE CARE OF THINGS AT HOME, OR GET ALONG WITH OTHER PEOPLE: NOT DIFFICULT AT ALL
SUM OF ALL RESPONSES TO PHQ QUESTIONS 1-9: 9
SUM OF ALL RESPONSES TO PHQ QUESTIONS 1-9: 9

## 2021-09-23 ASSESSMENT — PATIENT HEALTH QUESTIONNAIRE - PHQ9: SUM OF ALL RESPONSES TO PHQ QUESTIONS 1-9: 9

## 2021-09-23 ASSESSMENT — ANXIETY QUESTIONNAIRES: GAD7 TOTAL SCORE: 14

## 2021-09-23 NOTE — PROGRESS NOTES
"    Assessment & Plan     Advance care planning      Class 3 severe obesity with serious comorbidity and body mass index (BMI) of 40.0 to 44.9 in adult, unspecified obesity type (H)      Adjustment disorder with mixed anxiety and depressed mood    - sertraline (ZOLOFT) 100 MG tablet; Take 2 tablets (200 mg) by mouth daily    Hypertension, unspecified type    - Basic metabolic panel  (Ca, Cl, CO2, Creat, Gluc, K, Na, BUN); Future  - lisinopril (ZESTRIL) 5 MG tablet; Take 1 tablet (5 mg) by mouth daily  - Basic metabolic panel  (Ca, Cl, CO2, Creat, Gluc, K, Na, BUN)    Anxiety attack    - ALPRAZolam (XANAX) 0.25 MG tablet; Take 1 tablet (0.25 mg) by mouth daily as needed for anxiety Do not mix with alcohol or drive after taking    Wheezing    - albuterol (PROAIR HFA/PROVENTIL HFA/VENTOLIN HFA) 108 (90 Base) MCG/ACT inhaler; Inhale 1-2 puffs into the lungs every 6 hours    Cough    - albuterol (PROAIR HFA/PROVENTIL HFA/VENTOLIN HFA) 108 (90 Base) MCG/ACT inhaler; Inhale 1-2 puffs into the lungs every 6 hours    Screening for malignant neoplasm of cervix    - Pap imaged thin layer diagnostic reflex to HPV if ASCUS    History of HPV infection    - Pap imaged thin layer diagnostic reflex to HPV if ASCUS  - Adult Urology Referral; Future    Hx of abnormal cervical Pap smear    - Pap imaged thin layer diagnostic reflex to HPV if ASCUS  - Adult Urology Referral; Future    Menorrhagia with regular cycle    - Adult Urology Referral; Future    Recurrent UTI    - Adult Urology Referral; Future    30 minutes spent on the date of the encounter doing chart review, history and exam, documentation and further activities per the note     BMI:   Estimated body mass index is 41.46 kg/m  as calculated from the following:    Height as of this encounter: 1.687 m (5' 6.4\").    Weight as of this encounter: 117.9 kg (260 lb).   Weight management plan: Discussed healthy diet and exercise guidelines    See Patient Instructions: follow up as " needed.     Return in about 1 year (around 2022), or if symptoms worsen or fail to improve.    SOPHIE Hernandes  [facility] CAROL Cerda is a 46 year old who presents for the following health issues   Patient has had irregular paps.  Refill of medications- blood pressure, inhaler, antidepressant, prn anxiety medication- medications work well for her.     Vaginal Problem     History of Present Illness       Mental Health Follow-up:  Patient presents to follow-up on Depression & Anxiety.Patient's depression since last visit has been:  Medium  The patient is not having other symptoms associated with depression.  Patient's anxiety since last visit has been:  Medium  The patient is having other symptoms associated with anxiety.  Any significant life events: job concerns  Patient is feeling anxious or having panic attacks.  Patient has no concerns about alcohol or drug use.     Social History  Tobacco Use    Smoking status: Former Smoker      Packs/day: 0.00      Years: 10.00      Pack years: 0      Types: Cigarettes      Start date: 1994      Quit date: 2005      Years since quittin.6    Smokeless tobacco: Never Used    Tobacco comment: Social smoker  Alcohol use: Yes    Comment: Socially  Drug use: No      Today's PHQ-9         PHQ-9 Total Score:     (P) 9   PHQ-9 Q9 Thoughts of better off dead/self-harm past 2 weeks :   (P) Not at all   Thoughts of suicide or self harm:      Self-harm Plan:        Self-harm Action:          Safety concerns for self or others:           She eats 2-3 servings of fruits and vegetables daily.She consumes 0 sweetened beverage(s) daily.She exercises with enough effort to increase her heart rate 30 to 60 minutes per day.  She exercises with enough effort to increase her heart rate 4 days per week.   She is taking medications regularly.     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      CONSTITUTIONAL: NEGATIVE for fever, chills, change in weight  INTEGUMENTARY/SKIN: NEGATIVE for worrisome rashes, moles or lesions  EYES: NEGATIVE for vision changes or irritation  ENT/MOUTH: NEGATIVE for ear, mouth and throat problems  RESP: NEGATIVE for significant cough or SOB  BREAST: NEGATIVE for masses, tenderness or discharge  CV: NEGATIVE for chest pain, palpitations or peripheral edema  GI: NEGATIVE for nausea, abdominal pain, heartburn, or change in bowel habits  : NEGATIVE for frequency, dysuria, or hematuria POSITIVE for excessive menstruation and chronic UTI- 3 times in last 2 months.   MUSCULOSKELETAL: NEGATIVE for significant arthralgias or myalgia  NEURO: NEGATIVE for weakness, dizziness or paresthesias  ENDOCRINE: NEGATIVE for temperature intolerance, skin/hair changes  HEME: NEGATIVE for bleeding problems  PSYCHIATRIC: NEGATIVE for changes in mood or affect      Objective    /82   Pulse 109   Temp 97.6  F (36.4  C) (Tympanic)   Resp 20   Ht 1.687 m (5' 6.4\")   Wt 117.9 kg (260 lb)   LMP 09/01/2021   SpO2 99%   BMI 41.46 kg/m    Body mass index is 41.46 kg/m .  Physical Exam   GENERAL: healthy, alert and no distress  EYES: Eyes grossly normal to inspection, PERRL and conjunctivae and sclerae normal  HENT: ear canals and TM's normal, nose and mouth without ulcers or lesions  NECK: no adenopathy, no asymmetry, masses, or scars and thyroid normal to palpation  RESP: lungs clear to auscultation - no rales, rhonchi or wheezes  BREAST: deferred per guidelines- asymptomatic per pt  CV: regular rate and rhythm, normal S1 S2, no S3 or S4, no murmur, click or rub, no peripheral edema and peripheral pulses strong  ABDOMEN: soft, nontender, no hepatosplenomegaly, no masses and bowel sounds normal   (female): normal female external genitalia, normal urethral meatus, vaginal mucosa pink, moist, " well rugated, and normal cervix/adnexa/uterus without masses or discharge  MS: no gross musculoskeletal defects noted, no edema, no CVA tenderness  SKIN: no suspicious lesions or rashes  NEURO: Normal strength and tone, cranial nerves intact, mentation intact and speech normal  PSYCH: mentation appears normal, affect normal/bright  LYMPH: no cervical, supraclavicular, axillary, or inguinal adenopathy    See orders          Answers for HPI/ROS submitted by the patient on 9/22/2021  If you checked off any problems, how difficult have these problems made it for you to do your work, take care of things at home, or get along with other people?: Not difficult at all  PHQ9 TOTAL SCORE: 9  ROMANA 7 TOTAL SCORE: 14

## 2021-09-24 ENCOUNTER — OFFICE VISIT (OUTPATIENT)
Dept: FAMILY MEDICINE | Facility: CLINIC | Age: 46
End: 2021-09-24
Payer: COMMERCIAL

## 2021-09-24 VITALS
TEMPERATURE: 97.6 F | RESPIRATION RATE: 20 BRPM | BODY MASS INDEX: 41.78 KG/M2 | HEIGHT: 66 IN | OXYGEN SATURATION: 99 % | DIASTOLIC BLOOD PRESSURE: 82 MMHG | WEIGHT: 260 LBS | SYSTOLIC BLOOD PRESSURE: 132 MMHG | HEART RATE: 109 BPM

## 2021-09-24 DIAGNOSIS — F41.0 ANXIETY ATTACK: ICD-10-CM

## 2021-09-24 DIAGNOSIS — N39.0 RECURRENT UTI: ICD-10-CM

## 2021-09-24 DIAGNOSIS — R06.2 WHEEZING: ICD-10-CM

## 2021-09-24 DIAGNOSIS — N92.0 MENORRHAGIA WITH REGULAR CYCLE: ICD-10-CM

## 2021-09-24 DIAGNOSIS — Z71.89 ADVANCE CARE PLANNING: ICD-10-CM

## 2021-09-24 DIAGNOSIS — Z87.42 HX OF ABNORMAL CERVICAL PAP SMEAR: ICD-10-CM

## 2021-09-24 DIAGNOSIS — E66.813 CLASS 3 SEVERE OBESITY WITH SERIOUS COMORBIDITY AND BODY MASS INDEX (BMI) OF 40.0 TO 44.9 IN ADULT, UNSPECIFIED OBESITY TYPE (H): ICD-10-CM

## 2021-09-24 DIAGNOSIS — Z00.00 ROUTINE GENERAL MEDICAL EXAMINATION AT A HEALTH CARE FACILITY: Primary | ICD-10-CM

## 2021-09-24 DIAGNOSIS — E66.01 CLASS 3 SEVERE OBESITY WITH SERIOUS COMORBIDITY AND BODY MASS INDEX (BMI) OF 40.0 TO 44.9 IN ADULT, UNSPECIFIED OBESITY TYPE (H): ICD-10-CM

## 2021-09-24 DIAGNOSIS — Z12.4 SCREENING FOR MALIGNANT NEOPLASM OF CERVIX: ICD-10-CM

## 2021-09-24 DIAGNOSIS — R05.9 COUGH: ICD-10-CM

## 2021-09-24 DIAGNOSIS — Z86.19 HISTORY OF HPV INFECTION: ICD-10-CM

## 2021-09-24 DIAGNOSIS — I10 HYPERTENSION, UNSPECIFIED TYPE: ICD-10-CM

## 2021-09-24 DIAGNOSIS — F43.23 ADJUSTMENT DISORDER WITH MIXED ANXIETY AND DEPRESSED MOOD: ICD-10-CM

## 2021-09-24 LAB
ANION GAP SERPL CALCULATED.3IONS-SCNC: 9 MMOL/L (ref 3–14)
BUN SERPL-MCNC: 13 MG/DL (ref 7–30)
CALCIUM SERPL-MCNC: 8.9 MG/DL (ref 8.5–10.1)
CHLORIDE BLD-SCNC: 105 MMOL/L (ref 94–109)
CO2 SERPL-SCNC: 26 MMOL/L (ref 20–32)
CREAT SERPL-MCNC: 0.81 MG/DL (ref 0.52–1.04)
GFR SERPL CREATININE-BSD FRML MDRD: 89 ML/MIN/1.73M2
GLUCOSE BLD-MCNC: 107 MG/DL (ref 70–99)
POTASSIUM BLD-SCNC: 4.2 MMOL/L (ref 3.4–5.3)
SODIUM SERPL-SCNC: 140 MMOL/L (ref 133–144)

## 2021-09-24 PROCEDURE — 80048 BASIC METABOLIC PNL TOTAL CA: CPT | Performed by: NURSE PRACTITIONER

## 2021-09-24 PROCEDURE — 36415 COLL VENOUS BLD VENIPUNCTURE: CPT | Performed by: NURSE PRACTITIONER

## 2021-09-24 PROCEDURE — 90686 IIV4 VACC NO PRSV 0.5 ML IM: CPT | Performed by: NURSE PRACTITIONER

## 2021-09-24 PROCEDURE — 88175 CYTOPATH C/V AUTO FLUID REDO: CPT | Performed by: NURSE PRACTITIONER

## 2021-09-24 PROCEDURE — 87624 HPV HI-RISK TYP POOLED RSLT: CPT | Performed by: NURSE PRACTITIONER

## 2021-09-24 PROCEDURE — 99214 OFFICE O/P EST MOD 30 MIN: CPT | Mod: 25 | Performed by: NURSE PRACTITIONER

## 2021-09-24 PROCEDURE — 90471 IMMUNIZATION ADMIN: CPT | Performed by: NURSE PRACTITIONER

## 2021-09-24 PROCEDURE — 99396 PREV VISIT EST AGE 40-64: CPT | Mod: 25 | Performed by: NURSE PRACTITIONER

## 2021-09-24 RX ORDER — SERTRALINE HYDROCHLORIDE 100 MG/1
200 TABLET, FILM COATED ORAL DAILY
Qty: 180 TABLET | Refills: 3 | Status: SHIPPED | OUTPATIENT
Start: 2021-09-24 | End: 2022-10-10

## 2021-09-24 RX ORDER — LISINOPRIL 5 MG/1
5 TABLET ORAL DAILY
Qty: 90 TABLET | Refills: 3 | Status: SHIPPED | OUTPATIENT
Start: 2021-09-24 | End: 2022-10-10

## 2021-09-24 RX ORDER — ALBUTEROL SULFATE 90 UG/1
1-2 AEROSOL, METERED RESPIRATORY (INHALATION) EVERY 6 HOURS
Qty: 6.7 G | Refills: 1 | Status: SHIPPED | OUTPATIENT
Start: 2021-09-24 | End: 2023-10-02

## 2021-09-24 RX ORDER — ALPRAZOLAM 0.25 MG
0.25 TABLET ORAL DAILY PRN
Qty: 30 TABLET | Refills: 3 | Status: SHIPPED | OUTPATIENT
Start: 2021-09-24 | End: 2023-10-02

## 2021-09-24 ASSESSMENT — MIFFLIN-ST. JEOR: SCORE: 1852.45

## 2021-09-24 ASSESSMENT — PAIN SCALES - GENERAL: PAINLEVEL: NO PAIN (0)

## 2021-09-24 NOTE — PATIENT INSTRUCTIONS
Patient Education   Please make an appointment to follow up with your therapist and/or Psychiatric Clinic (phone: [phone]) within 1-3 days.     Return to the ED if you are having worsening thoughts/feelings of harming yourself or others, or any urgent/life-threatening concerns.     DISCHARGE RESOURCES:    [facility] [phone]     [facility] [phone] (detox), [phone] (outpatient & Lodging [facility])      Crisis Intervention: [phone] or [phone] (TTY: 593.745.2383).  Call anytime.    Suicide Awareness Voices of Education (SAVE) (www.save.org): 507-016-WDHE (0423)    National Suicide Prevention Line (www.mentalhealthmn.org): 125-482-DQLG (9659)    National [place] on Mental Illness (www.mn.jina.org): 309.269.9548 or 779-821-0798.    Tncl1igbp: text the word LIFE to 18460 for immediate support and crisis intervention    Mental Health Consumer/Survivor Network of [place] (www.mhcsn.net): 995.550.3990 or 824-491-5537    Mental Health Association of [place] (www.mentalhealth.org): 667.698.6699 or 361-755-5266

## 2021-09-28 LAB
BKR LAB AP GYN ADEQUACY: NORMAL
BKR LAB AP GYN INTERPRETATION: NORMAL
PATH REPORT.COMMENTS IMP SPEC: NORMAL

## 2021-09-29 DIAGNOSIS — Z11.51 SPECIAL SCREENING EXAMINATION FOR HUMAN PAPILLOMAVIRUS (HPV): Primary | ICD-10-CM

## 2021-09-30 DIAGNOSIS — Z11.51 SPECIAL SCREENING EXAMINATION FOR HUMAN PAPILLOMAVIRUS (HPV): Primary | ICD-10-CM

## 2021-10-01 PROBLEM — N92.0 MENORRHAGIA WITH REGULAR CYCLE: Status: ACTIVE | Noted: 2021-10-01

## 2021-10-01 PROBLEM — F41.0 ANXIETY ATTACK: Status: ACTIVE | Noted: 2021-10-01

## 2021-10-01 PROBLEM — Z87.42 HX OF ABNORMAL CERVICAL PAP SMEAR: Status: ACTIVE | Noted: 2021-10-01

## 2021-10-01 PROBLEM — N39.0 RECURRENT UTI: Status: ACTIVE | Noted: 2021-10-01

## 2021-10-01 PROBLEM — F43.23 ADJUSTMENT DISORDER WITH MIXED ANXIETY AND DEPRESSED MOOD: Status: ACTIVE | Noted: 2021-10-01

## 2021-10-06 ENCOUNTER — PATIENT OUTREACH (OUTPATIENT)
Dept: FAMILY MEDICINE | Facility: CLINIC | Age: 46
End: 2021-10-06

## 2021-10-06 LAB
HUMAN PAPILLOMA VIRUS 16 DNA: NEGATIVE
HUMAN PAPILLOMA VIRUS 18 DNA: NEGATIVE
HUMAN PAPILLOMA VIRUS FINAL DIAGNOSIS: NORMAL
HUMAN PAPILLOMA VIRUS OTHER HR: NEGATIVE

## 2021-10-27 ENCOUNTER — OFFICE VISIT (OUTPATIENT)
Dept: UROLOGY | Facility: CLINIC | Age: 46
End: 2021-10-27
Attending: NURSE PRACTITIONER
Payer: COMMERCIAL

## 2021-10-27 VITALS
BODY MASS INDEX: 40.51 KG/M2 | SYSTOLIC BLOOD PRESSURE: 112 MMHG | DIASTOLIC BLOOD PRESSURE: 68 MMHG | WEIGHT: 258.1 LBS | HEART RATE: 93 BPM | HEIGHT: 67 IN

## 2021-10-27 DIAGNOSIS — N39.0 RECURRENT UTI: ICD-10-CM

## 2021-10-27 DIAGNOSIS — N92.0 MENORRHAGIA WITH REGULAR CYCLE: ICD-10-CM

## 2021-10-27 DIAGNOSIS — Z87.42 HX OF ABNORMAL CERVICAL PAP SMEAR: ICD-10-CM

## 2021-10-27 DIAGNOSIS — N39.3 SUI (STRESS URINARY INCONTINENCE, FEMALE): ICD-10-CM

## 2021-10-27 DIAGNOSIS — R31.29 MICROSCOPIC HEMATURIA: ICD-10-CM

## 2021-10-27 DIAGNOSIS — N20.0 KIDNEY STONE: Primary | ICD-10-CM

## 2021-10-27 DIAGNOSIS — Z86.19 HISTORY OF HPV INFECTION: ICD-10-CM

## 2021-10-27 PROCEDURE — 87086 URINE CULTURE/COLONY COUNT: CPT | Performed by: OBSTETRICS & GYNECOLOGY

## 2021-10-27 PROCEDURE — G0463 HOSPITAL OUTPT CLINIC VISIT: HCPCS

## 2021-10-27 PROCEDURE — 99204 OFFICE O/P NEW MOD 45 MIN: CPT | Performed by: OBSTETRICS & GYNECOLOGY

## 2021-10-27 PROCEDURE — 51701 INSERT BLADDER CATHETER: CPT

## 2021-10-27 ASSESSMENT — MIFFLIN-ST. JEOR: SCORE: 1845.42

## 2021-10-27 ASSESSMENT — PATIENT HEALTH QUESTIONNAIRE - PHQ9: SUM OF ALL RESPONSES TO PHQ QUESTIONS 1-9: 9

## 2021-10-27 ASSESSMENT — PAIN SCALES - GENERAL: PAINLEVEL: MILD PAIN (2)

## 2021-10-27 NOTE — LETTER
"10/27/2021       RE: Zaida Lao  [address] [address]     Dear Colleague,    Thank you for referring your patient, Zaida Lao, to the [facility] [employer] [facility]. Please see a copy of my visit note below.    October 27, 2021    Referring Provider: Yara Tinajero NP  [address] [address] [facility]  [place] [address]    Primary Care Provider: Elzbieta Pulido    CC: Recurrent UTI, urinary incontinence     HPI:  Zaida Lao is a 46 year old female who presents for evaluation of her urinary incontinence.       \"Constantly leaking, Underwear always smells\"  No flank pain, no labial pain  Does have some chronic back pain  History of nephrolithiasis, had extreme right flank pain with that that which was distinctly different from her chronic back pain    Also concerned about heavy menstrual bleeding (see below)    Prolapse:  Do you feel a vaginal bulge? No                                      Pressure? No   Do you have to place your fingers in the vagina or in the rectum to have a bowel movement? no  Impact to quality of life? no     Stress Incontinence:  Do you leak urine with cough, sneeze, exercise? \"maybe\"  How often do you leak with cough, sneeze, exercise?  Unsure  How much do you usually leak? \"Constant\"   Do you wear a pad? No pad, absorbant underwear   Impact to quality of life? Very frustrated with smell of urine    Urge Incontinence:  Do you often get sudden urges to urinate? no  How often do have urges? Never  If so, do you leak with these urges? x  How much do you usually leak? x  Impact to quality of life? x    Voids/day:5-6  Nocturia: Yes (leakage), typically <1 void  Fluid intake: 5-10 8 oz drinks  Caffeine: 1-2 8 oz drinks    Urinating:  Difficulty starting urination or strain to void? no  Weak or intermittent stream? no  Incomplete emptying or dribbling? yes  Pain or burning with " urination? When having UTI symptoms  Any blood in your urine? yes    GI:  Constipation? no  Frequency stools daily     Straining for stools no  Stool consistency normal     Ever leak stool (Accidental Bowel Leakage)? no      If so, how often?                     If so, do you leak?                         Soiling without sensation? no  History of irritable bowel or Crohn's? no    Sexual/Pain:  Are you currently having sex? Yes  Pain with sex?   no   Sexual Partner:   Do any of these symptoms interfere with sex? No  Impact to quality of life? No    Prior therapy:  Ever done pelvic floor physical therapy? Not formal  Trial of medication? No  Have you ever tried a pessary? No    Medical History:  Do you have?   High Cholesterol? no     Diabetes? no  High Blood pressure? Yes (? White coat HTN)     Recurrent UTIs? Yes  ~12 over past two years, 6 in . No issues when young  UTIs: symptoms include urge to urinate, occasionally hematuria      Sleep Apnea? No, nocturnal hypoxemia s/p ENT surgery   Other medical problems: Anxiety       Surgical History:    Hysterectomy? No  tibial ligation 2014   Bladder Surgery? no   Other? C-sec x2  ENT Adenoidectomy, turbinoplasy, septoplasty 3/2020  Lithotripsy     OB/Gyn History:  Pregnancies? 2  Deliveries? 2  Vaginal 0  Section 2  (Polyhydramnios with both preg)  Current birth control? None  Periods? Yes, Heavy. 3-4 days bleeding, 5 days of spotting.  Diva cup, day 2 heavy emptying 4 times a day. sometimes doesn't work    When was the first day of your last period? Current  Last Pap smear? One month ago,  Any abnormal? Yes, colposcopy negative in late teens  Last mammogram? Summer 2021  Last colonoscopy? Never had     Medications/Vitamins/Supplements: 200 sertraline, 5 mg lisinopril, xanax as needed    Drug Allergies: None    Latex Allergy: no  Iodine Allergy no    Family History: (list relationship and age at diagnosis)  Breast cancer? Paternal aunt 50s, MGM  70s   Ovarian cancer? None   Colon cancer? MGM 70s  Other? PGM stroke in 80s, Aunt Afib, Myocarditis (PGF, Aaunt)     Social History  Marital status: yes  Do you/ have you ever smoke(d)  cigarettes? Socially, rare. When young  Drink more than 1 alcoholic beverage a day? 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 No                   Unusual headaches? Very rare  Leg pain w/ walking/exercise? No                       Easy bruising? No  Difficulty breathing w/ walking/exercise? No  Problems with vision? No  Dizziness, falls, or fainting? no  Excessive bleeding from cuts, gums, surgery? No  Other: Anxiety     Past Medical History:   Diagnosis Date     Cervical high risk HPV (human papillomavirus) test positive 2020     Depressive disorder     I take Lexapro     HTN (hypertension)      Human papillomavirus in conditions classified elsewhere and of unspecified site     genital warts , resolved     Mild or unspecified pre-eclampsia, antepartum      Pre-eclampsia      Seasonal allergies     Zyrtec helps and has an Rx for Flonase       Past Surgical History:   Procedure Laterality Date      SECTION  2011    Procedure: SECTION; Surgeon:CHRISSY BRITTON; Location:[facility] L[facility]      SECTION  2014    Procedure:  SECTION;  Surgeon: Chrissy Britton MD;  Location:  L+D     GYN SURGERY      tubal ligation     HC TOOTH EXTRACTION W/FORCEP      one dry socket     RW DENTAL (ABSTRACTED)       SEPTOPLASTY, TURBINOPLASTY, COMBINED Bilateral 3/9/2020    Procedure: SEPTOPLASTY, BILATERAL INFERIOR TURBINATE REDUCTION;  Surgeon: Jb Nava MD;  Location:  OR     SURGICAL HISTORY OF -       lithotripsy     TONSILLECTOMY, ADENOIDECTOMY, COMBINED Bilateral 3/9/2020    Procedure: BILATERAL TONSILLECTOMY AND ADENOIDECTOMY;  Surgeon: Jb Nava MD;  Location:  OR        Social History     Socioeconomic History     Marital status:      Spouse name: Not on file     Number of children: Not on file     Years of education: Not on file     Highest education level: Not on file   Occupational History     Occupation: student     Employer: [employer]     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   Tobacco Use     Smoking status: Former Smoker     Packs/day: 0.00     Years: 10.00     Pack years: 0.00     Types: Cigarettes     Start date: 1994     Quit date: 2005     Years since quittin.7     Smokeless tobacco: Never Used     Tobacco comment: Social smoker   Vaping Use     Vaping Use: Never used   Substance and Sexual Activity     Alcohol use: Yes     Comment: Socially     Drug use: Yes     Types: Marijuana     Comment: Gummies 2-3 per month     Sexual activity: Yes     Partners: Male     Birth control/protection: Female Surgical     Comment: Tubal ligation post c section    Other Topics Concern     Parent/sibling w/ CABG, MI or angioplasty before 65F 55M? No   Social History Narrative    Caffeine intake/servings daily - 0    Calcium intake/servings daily - 3    Exercise 5 times weekly - describe gym    Sunscreen used - Yes    Seatbelts used - Yes    Guns stored in the home - No    Self Breast Exam - Yes    Pap test up to date -  Yes    Eye exam up to date -  Yes    Dental exam up to date -  Yes    DEXA scan up to date -  No    Flex Sig/Colonoscopy up to date -  No    Mammography up to date -  No    Immunizations reviewed and up to date - Yes    Abuse: Current or Past (Physical, Sexual or Emotional) - No    Do you feel safe in your environment - Yes    Do you cope well with stress - Yes    Do you suffer from insomnia - No    Last updated by: Radha Mendoza  2013                             Social Determinants of Health     Financial Resource Strain:      Difficulty of Paying Living Expenses:    Food Insecurity:      Worried About Running Out of Food in the  Last Year:      Ran Out of Food in the Last Year:    Transportation Needs:      Lack of Transportation (Medical):      Lack of Transportation (Non-Medical):    Physical Activity:      Days of Exercise per Week:      Minutes of Exercise per Session:    Stress:      Feeling of Stress :    Social Connections:      Frequency of Communication with Friends and Family:      Frequency of Social Gatherings with Friends and Family:      Attends Anglican Services:      Active Member of Clubs or Organizations:      Attends Club or Organization Meetings:      Marital Status:    Intimate Partner Violence:      Fear of Current or Ex-Partner:      Emotionally Abused:      Physically Abused:      Sexually Abused:        Family History   Problem Relation Age of Onset     C.A.D. Paternal Grandfather          of MI     Breast Cancer Maternal Grandmother              Cancer - colorectal Maternal Grandmother      Circulatory Maternal Grandmother         aneurism     Prostate Cancer Maternal Grandfather              Eye Surgery Maternal Grandfather         cataracts, late in life     Eye Disorder Father         detached retina     Genitourinary Problems Father         kidney stones     Macular Degeneration Father      Hypertension Father      Nephrolithiasis Father      Cerebrovascular Disease Paternal Grandmother              Glaucoma No family hx of        ROS    Allergies   Allergen Reactions     Seasonal Allergies      Cold symptoms, stuffy nose, itchy throat and eyes     Sulfa Drugs      not effective       Current Outpatient Medications   Medication     albuterol (PROAIR HFA/PROVENTIL HFA/VENTOLIN HFA) 108 (90 Base) MCG/ACT inhaler     ALPRAZolam (XANAX) 0.25 MG tablet     fluticasone (FLONASE) 50 MCG/ACT spray     hydrOXYzine (ATARAX) 25 MG tablet     lisinopril (ZESTRIL) 5 MG tablet     sertraline (ZOLOFT) 100 MG tablet     No current facility-administered medications for this visit.       /68    "Pulse 93   Ht 1.689 m (5' 6.5\")   Wt 117.1 kg (258 lb 1.6 oz)   LMP 10/25/2021   BMI 41.03 kg/m   Patient's last menstrual period was 10/25/2021. Body mass index is 41.03 kg/m .  Ms. Lao is alert, comfortable in no acute distress, non-labored breathing.   Abdomen is soft, non-tender, non-distended, no CVAT.    Normal external female genitalia. The urethra was normal appearing without mass, NT.    She has good support on supine strain.  Speculum and bimanual exam are remarkable for small amount of menstrual blood c/w her period.      3/5 kegels.    Rectal exam with normal tone, no masses or tenderness.     pos SST  VOID 400 ml  PVR 5 mL in and out cath  Straight cath urine culture sent    A/P: Zaida Lao is a 46 year old F with Stress incontinence, recurrent UTIs and menorrhagia.     Recurrent UTI  Unclear why patient having recurrent UTIS,  Nephrolithiasis is a possible etiology. She did have small stone seen 2/2020. Occasional hematuria reported and seen on prior UA's; will schedule cystoscopy to rule out bladder pathology.     -CT urogram   -Straight Cath urine culture today  -schedule Cystoscopy   -Bactrim prescription provided if UTI symptoms occur with upcoming travel to [place]    Stress incontinence  Will refer to pelvic floor PT. Did discuss potential need for procedural intervention if not improving.     Menorrhagia   No concern to fibroids and Uterus normal size on 2020 CT and on exam, so will not pursue pelvic US. Mostly likely hyperplasia 2/2 anovulation.     -endometrial biopsy when no longer menstruating.          The patient was seen and discussed with Dr. Thurston, They agree with the assessment and plan as documented.     Hany Thurston MD  Professor, OB/GYN  Urogynecologist        CC  Patient Care Team:  Elzbieta Pulido PA-C as PCP - General (Physician Assistant)  Jb Nava MD as Assigned Surgical Provider  Yara Tinajero NP as Assigned PCP  Hany Thurston MD " as MD (OB/Gyn)

## 2021-10-27 NOTE — NURSING NOTE
Patient tested positive for urinary stress incontinence  Voided 400 ml   pvr was 5 ml  - this was obtained by simple catheter .  Patient tolerated it will  and uc will be sent to lab

## 2021-10-27 NOTE — PROGRESS NOTES
"October 27, 2021    Referring Provider: Yara Tinajero, XAVIER  [address] [place] [address]    Primary Care Provider: Elzbieta Pulido    CC: Recurrent UTI, urinary incontinence     HPI:  Zaida Lao is a 46 year old female who presents for evaluation of her urinary incontinence.       \"Constantly leaking, Underwear always smells\"  No flank pain, no labial pain  Does have some chronic back pain  History of nephrolithiasis, had extreme right flank pain with that that which was distinctly different from her chronic back pain    Also concerned about heavy menstrual bleeding (see below)    Prolapse:  Do you feel a vaginal bulge? No                                      Pressure? No   Do you have to place your fingers in the vagina or in the rectum to have a bowel movement? no  Impact to quality of life? no     Stress Incontinence:  Do you leak urine with cough, sneeze, exercise? \"maybe\"  How often do you leak with cough, sneeze, exercise?  Unsure  How much do you usually leak? \"Constant\"   Do you wear a pad? No pad, absorbant underwear   Impact to quality of life? Very frustrated with smell of urine    Urge Incontinence:  Do you often get sudden urges to urinate? no  How often do have urges? Never  If so, do you leak with these urges? x  How much do you usually leak? x  Impact to quality of life? x    Voids/day:5-6  Nocturia: Yes (leakage), typically <1 void  Fluid intake: 5-10 8 oz drinks  Caffeine: 1-2 8 oz drinks    Urinating:  Difficulty starting urination or strain to void? no  Weak or intermittent stream? no  Incomplete emptying or dribbling? yes  Pain or burning with urination? When having UTI symptoms  Any blood in your urine? yes    GI:  Constipation? no  Frequency stools daily     Straining for stools no  Stool consistency normal     Ever leak stool (Accidental Bowel Leakage)? no      If so, how often?                     If so, do you leak?                         Soiling without " sensation? no  History of irritable bowel or Crohn's? no    Sexual/Pain:  Are you currently having sex? Yes  Pain with sex?   no   Sexual Partner:   Do any of these symptoms interfere with sex? No  Impact to quality of life? No    Prior therapy:  Ever done pelvic floor physical therapy? Not formal  Trial of medication? No  Have you ever tried a pessary? No    Medical History:  Do you have?   High Cholesterol? no     Diabetes? no  High Blood pressure? Yes (? White coat HTN)     Recurrent UTIs? Yes  ~12 over past two years, 6 in . No issues when young  UTIs: symptoms include urge to urinate, occasionally hematuria      Sleep Apnea? No, nocturnal hypoxemia s/p ENT surgery   Other medical problems: Anxiety       Surgical History:    Hysterectomy? No  tibial ligation 2014   Bladder Surgery? no   Other? C-sec x2  ENT Adenoidectomy, turbinoplasy, septoplasty 3/2020  Lithotripsy     OB/Gyn History:  Pregnancies? 2  Deliveries? 2  Vaginal 0  Section 2  (Polyhydramnios with both preg)  Current birth control? None  Periods? Yes, Heavy. 3-4 days bleeding, 5 days of spotting.  Diva cup, day 2 heavy emptying 4 times a day. sometimes doesn't work    When was the first day of your last period? Current  Last Pap smear? One month ago,  Any abnormal? Yes, colposcopy negative in late teens  Last mammogram? Summer 2021  Last colonoscopy? Never had     Medications/Vitamins/Supplements: 200 sertraline, 5 mg lisinopril, xanax as needed    Drug Allergies: None    Latex Allergy: no  Iodine Allergy no    Family History: (list relationship and age at diagnosis)  Breast cancer? Paternal aunt 50s, MGM 70s   Ovarian cancer? None   Colon cancer? MGM 70s  Other? PGM stroke in 80s, Aunt Afib, Myocarditis (PGF, Aaunt)     Social History  Marital status: yes  Do you/ have you ever smoke(d)  cigarettes? Socially, rare. When young  Drink more than 1 alcoholic beverage a day? 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 No                   Unusual headaches? Very rare  Leg pain w/ walking/exercise? No                       Easy bruising? No  Difficulty breathing w/ walking/exercise? No  Problems with vision? No  Dizziness, falls, or fainting? no  Excessive bleeding from cuts, gums, surgery? No  Other: Anxiety     Past Medical History:   Diagnosis Date     Cervical high risk HPV (human papillomavirus) test positive 2020     Depressive disorder     I take Lexapro     HTN (hypertension)      Human papillomavirus in conditions classified elsewhere and of unspecified site     genital warts , resolved     Mild or unspecified pre-eclampsia, antepartum      Pre-eclampsia      Seasonal allergies     Zyrtec helps and has an Rx for Flonase       Past Surgical History:   Procedure Laterality Date      SECTION  2011    Procedure: SECTION; Surgeon:CHRISSY BRITTON; Location:[facility]      SECTION  2014    Procedure:  SECTION;  Surgeon: Chrissy Britton MD;  Location: [facility]     GYN SURGERY      tubal ligation     HC TOOTH EXTRACTION W/FORCEP      one dry socket     RW DENTAL (ABSTRACTED)       SEPTOPLASTY, TURBINOPLASTY, COMBINED Bilateral 3/9/2020    Procedure: SEPTOPLASTY, BILATERAL INFERIOR TURBINATE REDUCTION;  Surgeon: Jb Nava MD;  Location:  OR     SURGICAL HISTORY OF -       lithotripsy     TONSILLECTOMY, ADENOIDECTOMY, COMBINED Bilateral 3/9/2020    Procedure: BILATERAL TONSILLECTOMY AND ADENOIDECTOMY;  Surgeon: Jb Nava MD;  Location:  OR       Social History     Socioeconomic History     Marital status:      Spouse name: Not on file     Number of children: Not on file     Years of education: Not on file     Highest education level: Not on file   Occupational History     Occupation: student     Employer: [employer]     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   Tobacco  Use     Smoking status: Former Smoker     Packs/day: 0.00     Years: 10.00     Pack years: 0.00     Types: Cigarettes     Start date: 1994     Quit date: 2005     Years since quittin.7     Smokeless tobacco: Never Used     Tobacco comment: Social smoker   Vaping Use     Vaping Use: Never used   Substance and Sexual Activity     Alcohol use: Yes     Comment: Socially     Drug use: Yes     Types: Marijuana     Comment: Gummies 2-3 per month     Sexual activity: Yes     Partners: Male     Birth control/protection: Female Surgical     Comment: Tubal ligation post c section    Other Topics Concern     Parent/sibling w/ CABG, MI or angioplasty before 65F 55M? No   Social History Narrative    Caffeine intake/servings daily - 0    Calcium intake/servings daily - 3    Exercise 5 times weekly - describe gym    Sunscreen used - Yes    Seatbelts used - Yes    Guns stored in the home - No    Self Breast Exam - Yes    Pap test up to date -  Yes    Eye exam up to date -  Yes    Dental exam up to date -  Yes    DEXA scan up to date -  No    Flex Sig/Colonoscopy up to date -  No    Mammography up to date -  No    Immunizations reviewed and up to date - Yes    Abuse: Current or Past (Physical, Sexual or Emotional) - No    Do you feel safe in your environment - Yes    Do you cope well with stress - Yes    Do you suffer from insomnia - No    Last updated by: Radha Mendoza  2013                             Social Determinants of Health     Financial Resource Strain:      Difficulty of Paying Living Expenses:    Food Insecurity:      Worried About Running Out of Food in the Last Year:      Ran Out of Food in the Last Year:    Transportation Needs:      Lack of Transportation (Medical):      Lack of Transportation (Non-Medical):    Physical Activity:      Days of Exercise per Week:      Minutes of Exercise per Session:    Stress:      Feeling of Stress :    Social Connections:      Frequency of  "Communication with Friends and Family:      Frequency of Social Gatherings with Friends and Family:      Attends Yarsani Services:      Active Member of Clubs or Organizations:      Attends Club or Organization Meetings:      Marital Status:    Intimate Partner Violence:      Fear of Current or Ex-Partner:      Emotionally Abused:      Physically Abused:      Sexually Abused:        Family History   Problem Relation Age of Onset     C.A.D. Paternal Grandfather          of MI     Breast Cancer Maternal Grandmother              Cancer - colorectal Maternal Grandmother      Circulatory Maternal Grandmother         aneurism     Prostate Cancer Maternal Grandfather              Eye Surgery Maternal Grandfather         cataracts, late in life     Eye Disorder Father         detached retina     Genitourinary Problems Father         kidney stones     Macular Degeneration Father      Hypertension Father      Nephrolithiasis Father      Cerebrovascular Disease Paternal Grandmother              Glaucoma No family hx of        ROS    Allergies   Allergen Reactions     Seasonal Allergies      Cold symptoms, stuffy nose, itchy throat and eyes     Sulfa Drugs      not effective       Current Outpatient Medications   Medication     albuterol (PROAIR HFA/PROVENTIL HFA/VENTOLIN HFA) 108 (90 Base) MCG/ACT inhaler     ALPRAZolam (XANAX) 0.25 MG tablet     fluticasone (FLONASE) 50 MCG/ACT spray     hydrOXYzine (ATARAX) 25 MG tablet     lisinopril (ZESTRIL) 5 MG tablet     sertraline (ZOLOFT) 100 MG tablet     No current facility-administered medications for this visit.       /68   Pulse 93   Ht 1.689 m (5' 6.5\")   Wt 117.1 kg (258 lb 1.6 oz)   LMP 10/25/2021   BMI 41.03 kg/m   Patient's last menstrual period was 10/25/2021. Body mass index is 41.03 kg/m .  Ms. Lao is alert, comfortable in no acute distress, non-labored breathing.   Abdomen is soft, non-tender, non-distended, no CVAT.  "   Normal external female genitalia. The urethra was normal appearing without mass, NT.    She has good support on supine strain.  Speculum and bimanual exam are remarkable for small amount of menstrual blood c/w her period.      3/5 kegels.    Rectal exam with normal tone, no masses or tenderness.     pos SST  VOID 400 ml  PVR 5 mL in and out cath  Straight cath urine culture sent    A/P: Zaida Lao is a 46 year old F with Stress incontinence, recurrent UTIs and menorrhagia.     Recurrent UTI  Unclear why patient having recurrent UTIS,  Nephrolithiasis is a possible etiology. She did have small stone seen 2/2020. Occasional hematuria reported and seen on prior UA's; will schedule cystoscopy to rule out bladder pathology.     -CT urogram   -Straight Cath urine culture today  -schedule Cystoscopy   -Bactrim prescription provided if UTI symptoms occur with upcoming travel to [place]    Stress incontinence  Will refer to pelvic floor PT. Did discuss potential need for procedural intervention if not improving.     Menorrhagia   No concern to fibroids and Uterus normal size on 2020 CT and on exam, so will not pursue pelvic US. Mostly likely hyperplasia 2/2 anovulation.     -endometrial biopsy when no longer menstruating.          The patient was seen and discussed with Dr. Thurston, They agree with the assessment and plan as documented.     Hany Thurston MD  Professor, OB/GYN  Urogynecologist        CC  Patient Care Team:  Elzbieta Pulido PA-C as PCP - General (Physician Assistant)  Jb Nava MD as Assigned Surgical Provider  Yara Tinajero NP as Assigned PCP  Hany Thurston MD as MD (OB/Gyn)

## 2021-10-29 ENCOUNTER — TELEPHONE (OUTPATIENT)
Dept: UROLOGY | Facility: CLINIC | Age: 46
End: 2021-10-29

## 2021-10-29 DIAGNOSIS — N39.0 RECURRENT UTI: Primary | ICD-10-CM

## 2021-10-29 LAB — BACTERIA UR CULT: ABNORMAL

## 2021-10-29 RX ORDER — NITROFURANTOIN 25; 75 MG/1; MG/1
100 CAPSULE ORAL 2 TIMES DAILY
Qty: 28 CAPSULE | Refills: 0 | Status: SHIPPED | OUTPATIENT
Start: 2021-10-29 | End: 2021-11-05

## 2021-11-01 ENCOUNTER — PRE VISIT (OUTPATIENT)
Dept: UROLOGY | Facility: CLINIC | Age: 46
End: 2021-11-01

## 2021-11-01 NOTE — TELEPHONE ENCOUNTER
Reason for visit: Cystoscopy     Relevant information:   - Recurrent UTI - macrobid placed by Dr. Thurston  -  Stress incontinence - pelvic floor PT?  - Menorrhagia - endometrial biopsy?    Records/imaging/labs/orders: CT urogram on 11/10; UC on 10/27    Pt called: N/A    At Rooming: Urine dip

## 2021-11-10 ENCOUNTER — HOSPITAL ENCOUNTER (OUTPATIENT)
Dept: CT IMAGING | Facility: CLINIC | Age: 46
End: 2021-11-10
Attending: OBSTETRICS & GYNECOLOGY
Payer: COMMERCIAL

## 2021-11-10 ENCOUNTER — OFFICE VISIT (OUTPATIENT)
Dept: UROLOGY | Facility: CLINIC | Age: 46
End: 2021-11-10
Attending: OBSTETRICS & GYNECOLOGY
Payer: COMMERCIAL

## 2021-11-10 VITALS — HEART RATE: 88 BPM | SYSTOLIC BLOOD PRESSURE: 114 MMHG | DIASTOLIC BLOOD PRESSURE: 76 MMHG

## 2021-11-10 DIAGNOSIS — N92.0 MENORRHAGIA WITH REGULAR CYCLE: Primary | ICD-10-CM

## 2021-11-10 DIAGNOSIS — R30.0 DYSURIA: ICD-10-CM

## 2021-11-10 DIAGNOSIS — N39.0 RECURRENT UTI: ICD-10-CM

## 2021-11-10 DIAGNOSIS — N20.0 KIDNEY STONE: ICD-10-CM

## 2021-11-10 DIAGNOSIS — R31.29 MICROSCOPIC HEMATURIA: ICD-10-CM

## 2021-11-10 LAB
ALBUMIN UR-MCNC: NEGATIVE MG/DL
APPEARANCE UR: CLEAR
BILIRUB UR QL STRIP: NEGATIVE
COLOR UR AUTO: YELLOW
GLUCOSE UR STRIP-MCNC: NEGATIVE MG/DL
HGB UR QL STRIP: ABNORMAL
KETONES UR STRIP-MCNC: NEGATIVE MG/DL
LEUKOCYTE ESTERASE UR QL STRIP: ABNORMAL
NITRATE UR QL: NEGATIVE
PH UR STRIP: 6 [PH] (ref 5–8)
SP GR UR STRIP: 1.02 (ref 1–1.03)
UROBILINOGEN UR STRIP-ACNC: 0.2 E.U./DL

## 2021-11-10 PROCEDURE — 88305 TISSUE EXAM BY PATHOLOGIST: CPT | Mod: 26 | Performed by: PATHOLOGY

## 2021-11-10 PROCEDURE — 58100 BIOPSY OF UTERUS LINING: CPT | Performed by: OBSTETRICS & GYNECOLOGY

## 2021-11-10 PROCEDURE — 74178 CT ABD&PLV WO CNTR FLWD CNTR: CPT

## 2021-11-10 PROCEDURE — 81003 URINALYSIS AUTO W/O SCOPE: CPT | Performed by: OBSTETRICS & GYNECOLOGY

## 2021-11-10 PROCEDURE — 87086 URINE CULTURE/COLONY COUNT: CPT | Performed by: OBSTETRICS & GYNECOLOGY

## 2021-11-10 PROCEDURE — 74178 CT ABD&PLV WO CNTR FLWD CNTR: CPT | Mod: 26 | Performed by: RADIOLOGY

## 2021-11-10 PROCEDURE — 250N000011 HC RX IP 250 OP 636: Performed by: OBSTETRICS & GYNECOLOGY

## 2021-11-10 PROCEDURE — 88305 TISSUE EXAM BY PATHOLOGIST: CPT | Mod: TC | Performed by: OBSTETRICS & GYNECOLOGY

## 2021-11-10 PROCEDURE — G0463 HOSPITAL OUTPT CLINIC VISIT: HCPCS

## 2021-11-10 PROCEDURE — 250N000009 HC RX 250: Performed by: OBSTETRICS & GYNECOLOGY

## 2021-11-10 RX ORDER — IOPAMIDOL 755 MG/ML
150 INJECTION, SOLUTION INTRAVASCULAR ONCE
Status: COMPLETED | OUTPATIENT
Start: 2021-11-10 | End: 2021-11-10

## 2021-11-10 RX ADMIN — SODIUM CHLORIDE 132 ML: 9 INJECTION, SOLUTION INTRAVENOUS at 14:53

## 2021-11-10 RX ADMIN — IOPAMIDOL 126 ML: 755 INJECTION, SOLUTION INTRAVENOUS at 14:53

## 2021-11-10 ASSESSMENT — PAIN SCALES - GENERAL: PAINLEVEL: MODERATE PAIN (4)

## 2021-11-10 NOTE — LETTER
"11/10/2021       RE: Zaida Lao  [address] [facility] [address]     Dear Colleague,    Thank you for referring your patient, Zaida Lao, to the [facility] [place] [facility]. Please see a copy of my visit note below.    Endometrial Biopsy    Zaida Lao is a 46 year old P2 who presents today for endometrial biopsy secondary to menomenorrhagia.                                                                        Time Out - \"Pause for the Cause\"  Just before the procedure begins, through verbal and active participation of team members, verify:    Initials   Patient Name    JRF   Patient Date of Birth JRF   Procedure to be performed  JRF   Site, laterality, level or multiples, noting patient position   JRF   Relevant images or diagnostics available/displayed   JRF   Implants, special equipment or special requirements        JRF     Consent:  Written consent signed and scanned into medical record.    Pregnancy test: ND, pt s/p BTL    Using a Graves speculum, the cervix was visualized. The cervix was prepped with betadine. The endometrial pipelle was advanced through the cervix without difficulty to a depth of 8cm and a sample collected. No additional pass was made.       EBL: <5cc  Complications:  none  Pathology: EMB sample was sent to pathology.  Tolerance of Procedure:  Patient tolerated the procedure well.     Patient was instructed to call if she experiences any heavy bleeding, severe cramping, or abnormal vaginal discharge.  May take ibuprofen 400-800 mg PO TID PRN or naproxen 500 mg PO BID for cramping.    A: 46 year old P2 here undergoing EMB 2/2 to [facility].    Plan:  -Will send specimen to pathology  -Post procedure instructions given to patient  -Will call patient to discuss results when they are available    Hany Thurston MD  Professor, OB/GYN  Urogynecologist      "

## 2021-11-11 LAB — BACTERIA UR CULT: ABNORMAL

## 2021-11-12 ENCOUNTER — HOSPITAL ENCOUNTER (OUTPATIENT)
Dept: PHYSICAL THERAPY | Facility: REHABILITATION | Age: 46
End: 2021-11-12
Attending: OBSTETRICS & GYNECOLOGY
Payer: COMMERCIAL

## 2021-11-12 ENCOUNTER — TELEPHONE (OUTPATIENT)
Dept: UROLOGY | Facility: CLINIC | Age: 46
End: 2021-11-12

## 2021-11-12 DIAGNOSIS — N39.3 SUI (STRESS URINARY INCONTINENCE, FEMALE): ICD-10-CM

## 2021-11-12 LAB
PATH REPORT.COMMENTS IMP SPEC: NORMAL
PATH REPORT.COMMENTS IMP SPEC: NORMAL
PATH REPORT.FINAL DX SPEC: NORMAL
PATH REPORT.GROSS SPEC: NORMAL
PATH REPORT.MICROSCOPIC SPEC OTHER STN: NORMAL
PATH REPORT.RELEVANT HX SPEC: NORMAL
PHOTO IMAGE: NORMAL

## 2021-11-12 PROCEDURE — 97110 THERAPEUTIC EXERCISES: CPT | Mod: GP | Performed by: PHYSICAL THERAPIST

## 2021-11-12 PROCEDURE — 97161 PT EVAL LOW COMPLEX 20 MIN: CPT | Mod: GP | Performed by: PHYSICAL THERAPIST

## 2021-11-12 NOTE — TELEPHONE ENCOUNTER
LM with pt that the sensitivities on her urine culture show that it is sensitive to Macrobid and that she should continue to take it for 7 days total.

## 2021-11-17 ENCOUNTER — PRE VISIT (OUTPATIENT)
Dept: UROLOGY | Facility: CLINIC | Age: 46
End: 2021-11-17
Payer: COMMERCIAL

## 2021-11-26 ENCOUNTER — OFFICE VISIT (OUTPATIENT)
Dept: UROLOGY | Facility: CLINIC | Age: 46
End: 2021-11-26
Payer: COMMERCIAL

## 2021-11-26 VITALS
WEIGHT: 250 LBS | HEIGHT: 66 IN | DIASTOLIC BLOOD PRESSURE: 95 MMHG | HEART RATE: 96 BPM | BODY MASS INDEX: 40.18 KG/M2 | SYSTOLIC BLOOD PRESSURE: 136 MMHG

## 2021-11-26 DIAGNOSIS — R31.29 MICROSCOPIC HEMATURIA: ICD-10-CM

## 2021-11-26 DIAGNOSIS — R31.21 ASYMPTOMATIC MICROSCOPIC HEMATURIA: ICD-10-CM

## 2021-11-26 DIAGNOSIS — N39.0 RECURRENT UTI: Primary | ICD-10-CM

## 2021-11-26 LAB
ALBUMIN UR-MCNC: NEGATIVE MG/DL
APPEARANCE UR: CLEAR
BILIRUB UR QL STRIP: NEGATIVE
COLOR UR AUTO: YELLOW
GLUCOSE UR STRIP-MCNC: NEGATIVE MG/DL
HGB UR QL STRIP: ABNORMAL
KETONES UR STRIP-MCNC: NEGATIVE MG/DL
LEUKOCYTE ESTERASE UR QL STRIP: NEGATIVE
NITRATE UR QL: NEGATIVE
PH UR STRIP: 7 [PH] (ref 5–8)
SP GR UR STRIP: <1.005 (ref 1–1.03)
UROBILINOGEN UR STRIP-ACNC: 0.2 E.U./DL

## 2021-11-26 PROCEDURE — 52000 CYSTOURETHROSCOPY: CPT | Performed by: OBSTETRICS & GYNECOLOGY

## 2021-11-26 RX ORDER — LIDOCAINE HYDROCHLORIDE 20 MG/ML
JELLY TOPICAL ONCE
Status: DISCONTINUED | OUTPATIENT
Start: 2021-11-26 | End: 2022-05-17

## 2021-11-26 ASSESSMENT — PAIN SCALES - GENERAL: PAINLEVEL: NO PAIN (0)

## 2021-11-26 ASSESSMENT — MIFFLIN-ST. JEOR: SCORE: 1800.74

## 2021-11-26 NOTE — LETTER
11/26/2021       RE: Zaida Lao  [address] [facility] [address]     Dear Colleague,    Thank you for referring your patient, Zaida Lao, to the [facility] [place] at [facility]. Please see a copy of my visit note below.    Reason for Visit:  Cystoscopy    Clinical Data: Ms. Zaida Lao is a 46 year old female with a hx of recurrent UTIs and microscopic hematuria    Cystoscopy procedure:  Pt. Was consented and placed in the lithotomy position.  She was cleaned and preparred in the usual fashion.  Lidocain gel was inserted into the urethra and given time to take effect.  A 16 fr flexible cystoscope was then inserted through the urethra and into the bladder.  The urethra was wnl.  The bladder was without trabeculation.  No tumors, diverticulae, or stones.  Bilateral u/o's were effluxing clear urine.  The cystoscope was then withdrawn.  The pt. Tolerated the procedure well.    A/P:  46 year old female with recurrent UTIs and microscopic hematuria    Thank you for allowing me to participate in the care of  Ms. Zaida Lao and I will keep you updated on her progress.    Reviewed results to date with patient. EMB showed proliferative endometrium, cysto today was WNL. CT showed 2mm stones in both kidneys. Will refer to Dr. Flores for treatment. Will refer back to [facility] for IUD placement for MMR.       Hany Thurston MD

## 2021-11-26 NOTE — PROGRESS NOTES
Reason for Visit:  Cystoscopy    Clinical Data: Ms. Zaida Lao is a 46 year old female with a hx of recurrent UTIs and microscopic hematuria    Cystoscopy procedure:  Pt. Was consented and placed in the lithotomy position.  She was cleaned and preparred in the usual fashion.  Lidocain gel was inserted into the urethra and given time to take effect.  A 16 fr flexible cystoscope was then inserted through the urethra and into the bladder.  The urethra was wnl.  The bladder was without trabeculation.  No tumors, diverticulae, or stones.  Bilateral u/o's were effluxing clear urine.  The cystoscope was then withdrawn.  The pt. Tolerated the procedure well.    A/P:  46 year old female with recurrent UTIs and microscopic hematuria    Thank you for allowing me to participate in the care of  Ms. Zaida Lao and I will keep you updated on her progress.    Reviewed results to date with patient. EMB showed proliferative endometrium, cysto today was WNL. CT showed 2mm stones in both kidneys. Will refer to Dr. Flores for treatment. Will refer back to [facility] for IUD placement for MMR.       Hany Thurston MD

## 2021-11-26 NOTE — NURSING NOTE
"Chief Complaint   Patient presents with     Cystoscopy     Alena; hematuria       Blood pressure (!) 136/95, pulse 96, height 1.676 m (5' 6\"), weight 113.4 kg (250 lb), not currently breastfeeding. Body mass index is 40.35 kg/m .    Patient Active Problem List   Diagnosis     Anxiety     CARDIOVASCULAR SCREENING; LDL GOAL LESS THAN 160     Seasonal allergies     Kidney stones     Bilateral occipital neuralgia     Nasal polyp     Hypertension, unspecified type     LOC (obstructive sleep apnea)     Insomnia, unspecified type     Nasal obstruction     Nasal septal deviation     Nasal turbinate hypertrophy     Adenoid hypertrophy     Chronic tonsillitis     Class 3 severe obesity with serious comorbidity and body mass index (BMI) of 40.0 to 44.9 in adult, unspecified obesity type (H)     Cervical high risk HPV (human papillomavirus) test positive     Recurrent UTI     Menorrhagia with regular cycle     Hx of abnormal cervical Pap smear     Anxiety attack     Adjustment disorder with mixed anxiety and depressed mood       Allergies   Allergen Reactions     Seasonal Allergies      Cold symptoms, stuffy nose, itchy throat and eyes       Current Outpatient Medications   Medication Sig Dispense Refill     albuterol (PROAIR HFA/PROVENTIL HFA/VENTOLIN HFA) 108 (90 Base) MCG/ACT inhaler Inhale 1-2 puffs into the lungs every 6 hours 6.7 g 1     ALPRAZolam (XANAX) 0.25 MG tablet Take 1 tablet (0.25 mg) by mouth daily as needed for anxiety Do not mix with alcohol or drive after taking 30 tablet 3     fluticasone (FLONASE) 50 MCG/ACT spray Spray 1 spray into both nostrils daily       hydrOXYzine (ATARAX) 25 MG tablet TAKE 1-2 TABLETS (25-50 MG) BY MOUTH NIGHTLY AS NEEDED FOR ITCHING 30 tablet 1     lisinopril (ZESTRIL) 5 MG tablet Take 1 tablet (5 mg) by mouth daily 90 tablet 3     sertraline (ZOLOFT) 100 MG tablet Take 2 tablets (200 mg) by mouth daily 180 tablet 3       Social History     Tobacco Use     Smoking status: Former " Smoker     Packs/day: 0.00     Years: 10.00     Pack years: 0.00     Types: Cigarettes     Start date: 1994     Quit date: 2005     Years since quittin.8     Smokeless tobacco: Never Used     Tobacco comment: Social smoker   Vaping Use     Vaping Use: Never used   Substance Use Topics     Alcohol use: Yes     Comment: Socially     Drug use: Yes     Types: Marijuana     Comment: Gummies 2-3 per month       Invasive Procedure Safety Checklist:    Procedure: Cystoscopy    Action: Complete sections and checkboxes as appropriate.    Pre-procedure:  1. Patient ID Verified with 2 identifiers (Nayana and  or MRN) : YES    2. Procedure and site verified with patient/designee (when able) : YES    3. Accurate consent documentation in medical record : YES    4. H&P (or appropriate assessment) documented in medical record : N/A  H&P must be up to 30 days prior to procedure an updated within 24 hours of                 Procedure as applicable.     5. Relevant diagnostic and radiology test results appropriately labeled and displayed as applicable : YES    6. Blood products, implants, devices, and/or special equipment available for the procedure as applicable : YES    7. Procedure site(s) marked with provider initials [Exclusions: none] : NO    8. Marking not required. Reason : Yes  Procedure does not require site marking    Time Out:     Time-Out performed immediately prior to starting procedure, including verbal and active participation of all team members addressing: YES    1. Correct patient identity.  2. Confirmed that the correct side and site are marked.  3. An accurate procedure to be done.  4. Agreement on the procedure to be done.  5. Correct patient position.  6. Relevant images and results are properly labeled and appropriately displayed.  7. The need to administer antibiotics or fluids for irrigation purposes during the procedure as applicable.  8. Safety precautions based on patient history or  medication use.    During Procedure: Verification of correct person, site, and procedure occurs any time the responsibility for care of the patient is transferred to another member of the care team.    The following medication was given:     MEDICATION:  Lidocaine without epinephrine 2% jelly  ROUTE: urethral   SITE: urethral   DOSE: 10 mL  LOT #: [id number]  : [employer]  EXPIRATION DATE: 5/23  NDC#: [address]-8   Was there drug waste? No    Prior to med admin, verified patient identity using patient's name and date of birth.  Due to med administration, patient instructed to remain in clinic for 15 minutes  afterwards, and to report any adverse reaction to me immediately.    Drug Amount Wasted:  None.  Vial/Syringe: Syringe      BILL Sharif  11/26/2021  1:28 PM

## 2021-12-29 ENCOUNTER — TELEPHONE (OUTPATIENT)
Dept: UROLOGY | Facility: CLINIC | Age: 46
End: 2021-12-29

## 2021-12-29 DIAGNOSIS — N39.0 RECURRENT UTI: Primary | ICD-10-CM

## 2021-12-29 NOTE — TELEPHONE ENCOUNTER
Returned pt's call. She is concerned she might have a UTI. UA C&S order placed. Pt to go to a [facility] lab to complete.  Hany Thurston MD  Professor, OB/GYN  Urogynecologist

## 2022-01-20 ENCOUNTER — TELEPHONE (OUTPATIENT)
Dept: OBGYN | Facility: CLINIC | Age: 47
End: 2022-01-20
Payer: COMMERCIAL

## 2022-01-20 NOTE — TELEPHONE ENCOUNTER
Called patient to schedule consult for possible leep.   Asked patient to call back     Thank you     Mahendra

## 2022-01-20 NOTE — TELEPHONE ENCOUNTER
----- Message from Sofia Farley MD sent at 1/19/2022 12:24 PM CST -----  Regarding: FW: Ablation  Please mary grace for consult with me or other ob doc  ----- Message -----  From: Hany Thurston MD  Sent: 1/17/2022   1:56 PM CST  To: Sofia Farley MD  Subject: Ablation                                         Hi Sofia,    This very nice patient has MMR and would like to be considered for an ablation. Would you mind seeing her?  Her EMB in Nov was proliferative.  Thank you,    Hany

## 2022-01-27 ENCOUNTER — TELEPHONE (OUTPATIENT)
Dept: OBGYN | Facility: CLINIC | Age: 47
End: 2022-01-27
Payer: COMMERCIAL

## 2022-01-27 NOTE — TELEPHONE ENCOUNTER
[facility] Call Center    Phone Message    May a detailed message be left on voicemail: yes     Reason for Call: Other: Zaida called wondering if she has to have the initial appt with Dr. Farley tomorrow 1/28 and just have the ablasion done instead. She states she has been working with Dr. Thurston and wants to know if there is anyway she can go around it. Please call Zaida back to discuss, thank you.     Action Taken: Other: whs    Travel Screening: Not Applicable

## 2022-01-28 ENCOUNTER — VIRTUAL VISIT (OUTPATIENT)
Dept: OBGYN | Facility: CLINIC | Age: 47
End: 2022-01-28
Attending: OBSTETRICS & GYNECOLOGY
Payer: COMMERCIAL

## 2022-01-28 DIAGNOSIS — E66.813 CLASS 3 SEVERE OBESITY WITH SERIOUS COMORBIDITY AND BODY MASS INDEX (BMI) OF 40.0 TO 44.9 IN ADULT, UNSPECIFIED OBESITY TYPE (H): ICD-10-CM

## 2022-01-28 DIAGNOSIS — N92.0 MENORRHAGIA WITH REGULAR CYCLE: Primary | ICD-10-CM

## 2022-01-28 DIAGNOSIS — E66.01 CLASS 3 SEVERE OBESITY WITH SERIOUS COMORBIDITY AND BODY MASS INDEX (BMI) OF 40.0 TO 44.9 IN ADULT, UNSPECIFIED OBESITY TYPE (H): ICD-10-CM

## 2022-01-28 PROCEDURE — 99214 OFFICE O/P EST MOD 30 MIN: CPT | Mod: TEL | Performed by: OBSTETRICS & GYNECOLOGY

## 2022-01-28 NOTE — LETTER
2022       RE: Zaida Lao  [address] [facility] [address]     Dear Colleague,    Thank you for referring your patient, Zaida Lao, to the [facility] [place] [facility]. Please see a copy of my visit note below.    Telehealth appt for consult for ongoing menorrhagia.     46 yo  consult for endometrial ablation versus hysterectomy    CTD:  Tubal 2014 with  so stopped using birth control  3 menses in last 50 days which are excessively heavy  Distant history of abnormal paps (normal 2020 with neg HPV)  2021: negative endometrial biopsy    For many years has struggled with menorrhagia -- soaking through pads onto chairs at work, using pad on top of pad with tampon to manage flow -- without relief. 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     Patient Active Problem List   Diagnosis     Anxiety     CARDIOVASCULAR SCREENING; LDL GOAL LESS THAN 160     Seasonal allergies     Kidney stones     Bilateral occipital neuralgia     Nasal polyp     Hypertension, unspecified type     Insomnia, unspecified type     Nasal obstruction     Nasal septal deviation     Nasal turbinate hypertrophy     Adenoid hypertrophy     Chronic tonsillitis     Class 3 severe obesity with serious comorbidity and body mass index (BMI) of 40.0 to 44.9 in adult, unspecified obesity type (H)     Cervical high risk HPV (human papillomavirus) test positive     Recurrent UTI     Menorrhagia with regular cycle     Hx of abnormal cervical Pap smear     Anxiety attack     Adjustment disorder with mixed anxiety and depressed mood     Past Medical History:   Diagnosis Date     Cervical high risk HPV (human papillomavirus) test positive 2020     Depressive disorder     I take Lexapro     HTN (hypertension)      Human papillomavirus in conditions classified elsewhere and of unspecified site     genital  warts , resolved     Mild or unspecified pre-eclampsia, antepartum      LOC (obstructive sleep apnea)      Pre-eclampsia      Seasonal allergies     Zyrtec helps and has an Rx for Flonase     Past Surgical History:   Procedure Laterality Date      SECTION  2011    Procedure: SECTION; Surgeon:CHRISSY GAFFNEY; [facility]      SECTION  2014    Procedure:  SECTION;  Surgeon: Chrissy Gaffney MD;  [facility]: [facility]     GYN SURGERY      tubal ligation     HC TOOTH EXTRACTION W/FORCEP      one dry socket     RW DENTAL (ABSTRACTED)       SEPTOPLASTY, TURBINOPLASTY, COMBINED Bilateral 3/9/2020    Procedure: SEPTOPLASTY, BILATERAL INFERIOR TURBINATE REDUCTION;  Surgeon: Jb Nava MD;  Location: [facility]     SURGICAL HISTORY OF -       lithotripsy     TONSILLECTOMY, ADENOIDECTOMY, COMBINED Bilateral 3/9/2020    Procedure: BILATERAL TONSILLECTOMY AND ADENOIDECTOMY;  Surgeon: Jb Nava MD;  Location: [facility] OR     OB History    Para Term  AB Living   2 2 2 0 0 2   SAB IAB Ectopic Multiple Live Births   0 0 0 0 2      # Outcome Date GA Lbr Hema/2nd Weight Sex Delivery Anes PTL Lv   2 Term 14 37w1d  3.26 kg (7 lb 3 oz) F   N SAMANTHA      Birth Comments: Passed hearing and oximetry screens    Hepatitis B vaccine    Bilirubin fine in hospital - [facility]      Name: DINESH SWANSON      Apgar1: 8  Apgar5: 8   1 Term 11 38w1d  3.118 kg (6 lb 14 oz) M CS-LTranv  N SAMANTHA      Name: ALEX HERNÁNDEZ      Apgar1: 9  Apgar5: 9         Risks, benefits, alternatives were discussed for 20 minutes. Recovery expectations reviewed in detail for all options.     Desires definitive therapy. If seems reasonable will combine with urogynecological procedure.     She has a Vacation 22.    A/P:  Menorrhagia with negative embx and BMI 40  Laparoscopic hysterectomy with bilateral salpingectomy    Orders placed      Telemedicine Visit: The  patient's condition can be safely assessed and treated in telemedicine encounter.      Reason for Telemedicine Visit: Patient has requested telehealth visit COVID 19    Originating Site (Patient Location): Patient's home    Distant Site (Provider Location): [facility]: [facility]    Consent:  The patient/guardian has verbally consented to: the potential risks and benefits of telemedicine versus in person care; bill my insurance or make self-payment for services provided; and responsibility for payment of non-covered services.     Mode of Communication:  Doximity Phone    As the provider I attest to compliance with applicable laws and regulations related to telemedicine.          Sofia Farley MD

## 2022-01-28 NOTE — PROGRESS NOTES
Telehealth appt for consult for ongoing menorrhagia.     48 yo  consult for endometrial ablation versus hysterectomy    CTD:  Tubal 2014 with  so stopped using birth control  3 menses in last 50 days which are excessively heavy  Distant history of abnormal paps (normal 2020 with neg HPV)  2021: negative endometrial biopsy    For many years has struggled with menorrhagia -- soaking through pads onto chairs at work, using pad on top of pad with tampon to manage flow -- without relief. 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     Patient Active Problem List   Diagnosis     Anxiety     CARDIOVASCULAR SCREENING; LDL GOAL LESS THAN 160     Seasonal allergies     Kidney stones     Bilateral occipital neuralgia     Nasal polyp     Hypertension, unspecified type     Insomnia, unspecified type     Nasal obstruction     Nasal septal deviation     Nasal turbinate hypertrophy     Adenoid hypertrophy     Chronic tonsillitis     Class 3 severe obesity with serious comorbidity and body mass index (BMI) of 40.0 to 44.9 in adult, unspecified obesity type (H)     Cervical high risk HPV (human papillomavirus) test positive     Recurrent UTI     Menorrhagia with regular cycle     Hx of abnormal cervical Pap smear     Anxiety attack     Adjustment disorder with mixed anxiety and depressed mood     Past Medical History:   Diagnosis Date     Cervical high risk HPV (human papillomavirus) test positive 2020     Depressive disorder     I take Lexapro     HTN (hypertension)      Human papillomavirus in conditions classified elsewhere and of unspecified site     genital warts , resolved     Mild or unspecified pre-eclampsia, antepartum      LOC (obstructive sleep apnea)      Pre-eclampsia      Seasonal allergies     Zyrtec helps and has an Rx for Flonase     Past Surgical History:   Procedure Laterality Date      SECTION  2011    Procedure: SECTION; Surgeon:GULSHAN  CHRISSY THEODORE; [facility]      SECTION  2014    Procedure:  SECTION;  Surgeon: Chrissy Britton MD;  [facility] L[facility]     GYN SURGERY      tubal ligation     HC TOOTH EXTRACTION W/FORCEP      one dry socket     RW DENTAL (ABSTRACTED)       SEPTOPLASTY, TURBINOPLASTY, COMBINED Bilateral 3/9/2020    Procedure: SEPTOPLASTY, BILATERAL INFERIOR TURBINATE REDUCTION;  Surgeon: Jb Nava MD;  [facility]     SURGICAL HISTORY OF -       lithotripsy     TONSILLECTOMY, ADENOIDECTOMY, COMBINED Bilateral 3/9/2020    Procedure: BILATERAL TONSILLECTOMY AND ADENOIDECTOMY;  Surgeon: Jb Nava MD;  Location: [facility]     OB History    Para Term  AB Living   2 2 2 0 0 2   SAB IAB Ectopic Multiple Live Births   0 0 0 0 2      # Outcome Date GA Lbr Hema/2nd Weight Sex Delivery Anes PTL Lv   2 Term 14 37w1d  3.26 kg (7 lb 3 oz) F   N SAMANTHA      Birth Comments: Passed hearing and oximetry screens    Hepatitis B vaccine    Bilirubin fine in hospital - [facility]      Name: DINESH SWANSON      Apgar1: 8  Apgar5: 8   1 Term 11 38w1d  3.118 kg (6 lb 14 oz) M CS-LTranv  N SAMANTHA      Name: ALEX HERNÁNDEZ ISELA      Apgar1: 9  Apgar5: 9         Risks, benefits, alternatives were discussed for 20 minutes. Recovery expectations reviewed in detail for all options.     Desires definitive therapy. If seems reasonable will combine with urogynecological procedure.     She has a Vacation 22.    A/P:  Menorrhagia with negative embx and BMI 40  Laparoscopic hysterectomy with bilateral salpingectomy    Orders placed    Time in discussion: 37 minutes.       Telemedicine Visit: The patient's condition can be safely assessed and treated in telemedicine encounter.      Reason for Telemedicine Visit: Patient has requested telehealth visit COVID 19    Originating Site (Patient Location): Patient's home    Distant Site (Provider Location): [facility]    Consent:  The patient/guardian has verbally consented to: the potential risks and benefits of telemedicine versus in person care; bill my insurance or make self-payment for services provided; and responsibility for payment of non-covered services.     Mode of Communication:  Doximity Phone    As the provider I attest to compliance with applicable laws and regulations related to telemedicine.          Sofia Farley MD

## 2022-01-28 NOTE — TELEPHONE ENCOUNTER
Tried to reach Zaida,  but received voicemail.  Left message that consult appointment is needed, but could possibly do a phone visit if she would prefer.  To call back if she wants to change visit type

## 2022-02-02 RX ORDER — ACETAMINOPHEN 325 MG/1
975 TABLET ORAL ONCE
Status: CANCELLED | OUTPATIENT
Start: 2022-02-02 | End: 2022-02-02

## 2022-02-02 RX ORDER — CEFAZOLIN SODIUM 2 G/100ML
2 INJECTION, SOLUTION INTRAVENOUS
Status: CANCELLED | OUTPATIENT
Start: 2022-02-02

## 2022-02-02 RX ORDER — CEFAZOLIN SODIUM 2 G/100ML
2 INJECTION, SOLUTION INTRAVENOUS SEE ADMIN INSTRUCTIONS
Status: CANCELLED | OUTPATIENT
Start: 2022-02-02

## 2022-02-08 ENCOUNTER — TELEPHONE (OUTPATIENT)
Dept: OBGYN | Facility: CLINIC | Age: 47
End: 2022-02-08
Payer: COMMERCIAL

## 2022-02-09 NOTE — TELEPHONE ENCOUNTER
[facility] Call Center    Phone Message    May a detailed message be left on voicemail: yes     Reason for Call: The patient called back regarding message below. Please advise. Thank you.    Action Taken: Message routed to:  Other: [facility]    Travel Screening: Not Applicable

## 2022-03-23 ENCOUNTER — LAB (OUTPATIENT)
Dept: LAB | Facility: CLINIC | Age: 47
End: 2022-03-23
Payer: COMMERCIAL

## 2022-03-23 DIAGNOSIS — N39.0 RECURRENT UTI: ICD-10-CM

## 2022-03-23 LAB
ALBUMIN UR-MCNC: NEGATIVE MG/DL
APPEARANCE UR: CLEAR
BILIRUB UR QL STRIP: NEGATIVE
COLOR UR AUTO: ABNORMAL
GLUCOSE UR STRIP-MCNC: NEGATIVE MG/DL
HGB UR QL STRIP: ABNORMAL
KETONES UR STRIP-MCNC: NEGATIVE MG/DL
LEUKOCYTE ESTERASE UR QL STRIP: ABNORMAL
MUCOUS THREADS #/AREA URNS LPF: PRESENT /LPF
NITRATE UR QL: NEGATIVE
PH UR STRIP: 6.5 [PH] (ref 5–7)
RBC URINE: 2 /HPF
SP GR UR STRIP: 1.03 (ref 1–1.03)
SQUAMOUS EPITHELIAL: <1 /HPF
UROBILINOGEN UR STRIP-ACNC: ABNORMAL
WBC URINE: 10 /HPF

## 2022-03-23 PROCEDURE — 81001 URINALYSIS AUTO W/SCOPE: CPT

## 2022-03-23 PROCEDURE — 87086 URINE CULTURE/COLONY COUNT: CPT

## 2022-03-23 PROCEDURE — 87186 SC STD MICRODIL/AGAR DIL: CPT

## 2022-03-25 ENCOUNTER — TELEPHONE (OUTPATIENT)
Dept: UROLOGY | Facility: CLINIC | Age: 47
End: 2022-03-25
Payer: COMMERCIAL

## 2022-03-25 DIAGNOSIS — N39.0 RECURRENT UTI: Primary | ICD-10-CM

## 2022-03-25 LAB — BACTERIA UR CULT: ABNORMAL

## 2022-03-25 RX ORDER — NITROFURANTOIN 25; 75 MG/1; MG/1
100 CAPSULE ORAL 2 TIMES DAILY
Qty: 14 CAPSULE | Refills: 0 | Status: SHIPPED | OUTPATIENT
Start: 2022-03-25 | End: 2022-04-01

## 2022-03-25 NOTE — TELEPHONE ENCOUNTER
Urine culture positive for 10-50 CFU E. Coli. WIll treat with nitrofurantoin. Discussed starting prophylaxis. Pt declined at present. WIll start if she has another UTI.  Hany Thurston MD  Professor, OB/GYN  Urogynecologist

## 2022-04-04 ENCOUNTER — TRANSCRIBE ORDERS (OUTPATIENT)
Dept: OBGYN | Facility: CLINIC | Age: 47
End: 2022-04-04
Payer: COMMERCIAL

## 2022-04-04 DIAGNOSIS — Z01.812 ENCOUNTER FOR PRE-OPERATIVE LABORATORY TESTING: Primary | ICD-10-CM

## 2022-04-11 NOTE — TELEPHONE ENCOUNTER
FUTURE VISIT INFORMATION      SURGERY INFORMATION:    Date: 5/17/22    [facility]    Surgeon:  Shahida Coyne MD    Anesthesia Type:  general    Procedure: HYSTERECTOMY, TOTAL, ROBOT-ASSISTED, LAPAROSCOPIC, WITH BILATERAL SALPINGECTOMY    RECORDS REQUESTED FROM:        Primary Care Provider[facility]    Pertinent Medical History: hypertension    Most recent PFT's: 3/31/19

## 2022-04-14 NOTE — TELEPHONE ENCOUNTER
FUTURE VISIT INFORMATION        SURGERY INFORMATION:    Date: 5/17/22    Location: [facility] or    Surgeon:  Shahida Coyne MD    Anesthesia Type:  general    Procedure: HYSTERECTOMY, TOTAL, ROBOT-ASSISTED, LAPAROSCOPIC, WITH BILATERAL SALPINGECTOMY     RECORDS REQUESTED FROM:          [facility] Care [facility]     Pertinent Medical History: hypertension     Most recent PFT's: 3/31/19

## 2022-04-29 ENCOUNTER — PRE VISIT (OUTPATIENT)
Dept: SURGERY | Facility: CLINIC | Age: 47
End: 2022-04-29

## 2022-05-02 ENCOUNTER — OFFICE VISIT (OUTPATIENT)
Dept: SURGERY | Facility: CLINIC | Age: 47
End: 2022-05-02

## 2022-05-02 ENCOUNTER — LAB (OUTPATIENT)
Dept: LAB | Facility: CLINIC | Age: 47
End: 2022-05-02
Payer: COMMERCIAL

## 2022-05-02 ENCOUNTER — PRE VISIT (OUTPATIENT)
Facility: CLINIC | Age: 47
End: 2022-05-02

## 2022-05-02 VITALS
RESPIRATION RATE: 16 BRPM | TEMPERATURE: 97.8 F | HEART RATE: 82 BPM | OXYGEN SATURATION: 97 % | SYSTOLIC BLOOD PRESSURE: 119 MMHG | DIASTOLIC BLOOD PRESSURE: 83 MMHG | WEIGHT: 264.8 LBS | BODY MASS INDEX: 42.56 KG/M2 | HEIGHT: 66 IN

## 2022-05-02 DIAGNOSIS — Z01.818 PRE-OPERATIVE GENERAL PHYSICAL EXAMINATION: Primary | ICD-10-CM

## 2022-05-02 DIAGNOSIS — N92.1 MENORRHAGIA WITH IRREGULAR CYCLE: ICD-10-CM

## 2022-05-02 DIAGNOSIS — Z01.818 PRE-OPERATIVE GENERAL PHYSICAL EXAMINATION: ICD-10-CM

## 2022-05-02 DIAGNOSIS — N39.0 RECURRENT UTI: ICD-10-CM

## 2022-05-02 LAB
ABO/RH(D): NORMAL
ALBUMIN UR-MCNC: NEGATIVE MG/DL
ANION GAP SERPL CALCULATED.3IONS-SCNC: 8 MMOL/L (ref 3–14)
ANTIBODY SCREEN: NEGATIVE
APPEARANCE UR: CLEAR
BILIRUB UR QL STRIP: NEGATIVE
BUN SERPL-MCNC: 12 MG/DL (ref 7–30)
CALCIUM SERPL-MCNC: 9.3 MG/DL (ref 8.5–10.1)
CHLORIDE BLD-SCNC: 104 MMOL/L (ref 94–109)
CO2 SERPL-SCNC: 30 MMOL/L (ref 20–32)
COLOR UR AUTO: NORMAL
CREAT SERPL-MCNC: 0.69 MG/DL (ref 0.52–1.04)
ERYTHROCYTE [DISTWIDTH] IN BLOOD BY AUTOMATED COUNT: 13.2 % (ref 10–15)
GFR SERPL CREATININE-BSD FRML MDRD: >90 ML/MIN/1.73M2
GLUCOSE BLD-MCNC: 95 MG/DL (ref 70–99)
GLUCOSE UR STRIP-MCNC: NEGATIVE MG/DL
HCT VFR BLD AUTO: 41.1 % (ref 35–47)
HGB BLD-MCNC: 13.6 G/DL (ref 11.7–15.7)
HGB UR QL STRIP: NEGATIVE
KETONES UR STRIP-MCNC: NEGATIVE MG/DL
LEUKOCYTE ESTERASE UR QL STRIP: NEGATIVE
MCH RBC QN AUTO: 29.1 PG (ref 26.5–33)
MCHC RBC AUTO-ENTMCNC: 33.1 G/DL (ref 31.5–36.5)
MCV RBC AUTO: 88 FL (ref 78–100)
NITRATE UR QL: NEGATIVE
PH UR STRIP: 7 [PH] (ref 5–7)
PLATELET # BLD AUTO: 312 10E3/UL (ref 150–450)
POTASSIUM BLD-SCNC: 4.6 MMOL/L (ref 3.4–5.3)
RBC # BLD AUTO: 4.68 10E6/UL (ref 3.8–5.2)
SODIUM SERPL-SCNC: 142 MMOL/L (ref 133–144)
SP GR UR STRIP: 1.01 (ref 1–1.03)
SPECIMEN EXPIRATION DATE: NORMAL
UROBILINOGEN UR STRIP-MCNC: NORMAL MG/DL
WBC # BLD AUTO: 8.7 10E3/UL (ref 4–11)

## 2022-05-02 PROCEDURE — 86850 RBC ANTIBODY SCREEN: CPT | Mod: 90 | Performed by: PATHOLOGY

## 2022-05-02 PROCEDURE — 85027 COMPLETE CBC AUTOMATED: CPT | Performed by: PATHOLOGY

## 2022-05-02 PROCEDURE — 99205 OFFICE O/P NEW HI 60 MIN: CPT | Performed by: PHYSICIAN ASSISTANT

## 2022-05-02 PROCEDURE — 86900 BLOOD TYPING SEROLOGIC ABO: CPT | Mod: 90 | Performed by: PATHOLOGY

## 2022-05-02 PROCEDURE — 36415 COLL VENOUS BLD VENIPUNCTURE: CPT | Performed by: PATHOLOGY

## 2022-05-02 PROCEDURE — 80048 BASIC METABOLIC PNL TOTAL CA: CPT | Performed by: PATHOLOGY

## 2022-05-02 PROCEDURE — 86901 BLOOD TYPING SEROLOGIC RH(D): CPT | Mod: 90 | Performed by: PATHOLOGY

## 2022-05-02 PROCEDURE — 81003 URINALYSIS AUTO W/O SCOPE: CPT | Performed by: PATHOLOGY

## 2022-05-02 PROCEDURE — 99000 SPECIMEN HANDLING OFFICE-LAB: CPT | Performed by: PATHOLOGY

## 2022-05-02 ASSESSMENT — PAIN SCALES - GENERAL: PAINLEVEL: NO PAIN (0)

## 2022-05-02 ASSESSMENT — LIFESTYLE VARIABLES: TOBACCO_USE: 1

## 2022-05-02 NOTE — H&P
Pre-Operative H & P     CC:  Preoperative exam to assess for increased cardiopulmonary risk while undergoing surgery and anesthesia.    Date of Encounter: 5/2/2022  Primary Care Physician:  Elzbieta Pulido     Reason for visit: pre-operative physical; excessive mentrual bleeding    HPI  Zaida Lao is a 47 year old female who presents for pre-operative H & P in preparation for  Procedure Information     Date/Time: 5/17/22     Procedure: Total Robotic abdominal hysterectomy    Anesthesia type: general    Pre-op diagnosis: menorrhagia; proliferative type endometrium     Location: [facility]    Providers: Dr. Shahida Coyne          This is a 47 year old female who reports heavy periods, with an episode of 3 menses in 50 days in January 2022. She had a negative endometrial biopsy in 2021. She was using a Rolando cup, but had recurrent UTIs so thinking this may be a source she switched to tampons. Since January she has experienced menses every 24 day with 2 heavy days with leakage from tampons. LMP 4/25/22  She has no abdominal pain. She does have a history of recurrent UTIs - last one 3/25/22 S/P course of Nitrofurantoin.    Co morbidities include obesity, HTN, depression  History is obtained from the patient.    Hx of abnormal bleeding or anti-platelet use: as above    Menstrual history: Patient's last menstrual period was 04/25/2022 (exact date).:     Past Medical History  Past Medical History:   Diagnosis Date     Cervical high risk HPV (human papillomavirus) test positive 11/13/2020     Depressive disorder 2000    I take Lexapro     HTN (hypertension)      Human papillomavirus in conditions classified elsewhere and of unspecified site     genital warts 1996, resolved     Mild or unspecified pre-eclampsia, antepartum      LOC (obstructive sleep apnea)- pt feels it resolved since tonsillectomy and septoplasty - no testing after the surgery       Pre-eclampsia      Seasonal allergies     Zyrtec helps and has an Rx for Flonase       Past Surgical History  Past Surgical History:   Procedure Laterality Date      SECTION  2011    Procedure: SECTION; Surgeon:CHRISSY BRITTON; [facility]      SECTION  2014    Procedure:  SECTION;  Surgeon: Chrissy Britton MD;  [facility] L[facility]     GYN SURGERY      tubal ligation     HC TOOTH EXTRACTION W/FORCEP      one dry socket     RW DENTAL (ABSTRACTED)       SEPTOPLASTY, TURBINOPLASTY, COMBINED Bilateral 3/9/2020    Procedure: SEPTOPLASTY, BILATERAL INFERIOR TURBINATE REDUCTION;  Surgeon: Jb Nava MD;  Location: [facility]     SURGICAL HISTORY OF -       lithotripsy     TONSILLECTOMY, ADENOIDECTOMY, COMBINED Bilateral 3/9/2020    Procedure: BILATERAL TONSILLECTOMY AND ADENOIDECTOMY;  Surgeon: Jb Nava MD;  Location: [facility] OR       Prior to Admission Medications  Current Outpatient Medications   Medication Sig Dispense Refill     albuterol (PROAIR HFA/PROVENTIL HFA/VENTOLIN HFA) 108 (90 Base) MCG/ACT inhaler Inhale 1-2 puffs into the lungs every 6 hours 6.7 g 1     ALPRAZolam (XANAX) 0.25 MG tablet Take 1 tablet (0.25 mg) by mouth daily as needed for anxiety Do not mix with alcohol or drive after taking 30 tablet 3     fluticasone (FLONASE) 50 MCG/ACT spray Spray 1 spray into both nostrils daily       hydrOXYzine (ATARAX) 25 MG tablet TAKE 1-2 TABLETS (25-50 MG) BY MOUTH NIGHTLY AS NEEDED FOR ITCHING 30 tablet 1     lisinopril (ZESTRIL) 5 MG tablet Take 1 tablet (5 mg) by mouth daily (Patient taking differently: Take 5 mg by mouth every morning) 90 tablet 3     sertraline (ZOLOFT) 100 MG tablet Take 2 tablets (200 mg) by mouth daily (Patient taking differently: Take 200 mg by mouth every morning) 180 tablet 3       Allergies  Allergies   Allergen Reactions     Seasonal Allergies      Cold symptoms, stuffy nose, itchy throat and eyes       Social  History  Social History     Socioeconomic History     Marital status:    Occupational History     Occupation: student     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   Tobacco Use     Smoking status: Former Smoker     Packs/day: 0.00     Years: 10.00     Pack years: 0.00     Types: Cigarettes     Start date: 1994     Quit date: 2005     Years since quittin.2     Smokeless tobacco: Never Used     Tobacco comment: Social smoker   Vaping Use     Vaping Use: Never used   Substance and Sexual Activity     Alcohol use: Yes     Comment: Socially     Drug use: Yes     Types: Marijuana     Comment: Gummies 2-3 per month     Sexual activity: Yes     Partners: Male     Birth control/protection: Female Surgical     Comment: Tubal ligation post c section    Other Topics Concern     Parent/sibling w/ CABG, MI or angioplasty before 65F 55M? No     Family History  Family History   Problem Relation Age of Onset     C.A.D. Paternal Grandfather          of MI     Breast Cancer Maternal Grandmother              Cancer - colorectal Maternal Grandmother      Circulatory Maternal Grandmother         aneurism     Prostate Cancer Maternal Grandfather              Eye Surgery Maternal Grandfather         cataracts, late in life     Eye Disorder Father         detached retina     Genitourinary Problems Father         kidney stones     Macular Degeneration Father      Hypertension Father      Nephrolithiasis Father      Cerebrovascular Disease Paternal Grandmother              Glaucoma No family hx of        Review of Systems  The complete review of systems is negative other than noted in the HPI or here.   Anesthesia Evaluation   Pt has had prior anesthetic. Type: General.    History of anesthetic complications  - PONV.      ROS/MED HX  ENT/Pulmonary: Comment: Stopped CPAP after 3 months - she could not tolerate  Referred to ENT  And recommendation was T & A and septoplasty.  Rare snoring now  Quit  "smoking 2005    (+) sleep apnea, doesn't use CPAP, LOC risk factors, hypertension, obese, tobacco use, Past use, 10  Pack-Year Hx,      Neurologic:       Cardiovascular:     (+) hypertension-----Previous cardiac testing     METS/Exercise Tolerance:  Comment: HTN diagnosed in 2020.  On 5 mg Lisinopril - normotensive   Hematologic:  - neg hematologic  ROS     Musculoskeletal:  - neg musculoskeletal ROS     GI/Hepatic:  - neg GI/hepatic ROS     Renal/Genitourinary:     (+) Nephrolithiasis ,     Endo:     (+) Obesity,     Psychiatric/Substance Use:     (+) psychiatric history depression     Infectious Disease:  - neg infectious disease ROS     Malignancy:  - neg malignancy ROS     Other:            /83 (BP Location: Right arm, Patient Position: Sitting, Cuff Size: Adult Large)   Pulse 82   Temp 97.8  F (36.6  C) (Oral)   Resp 16   Ht 1.676 m (5' 6\")   Wt 120.1 kg (264 lb 12.8 oz)   LMP 04/25/2022 (Exact Date)   SpO2 97%   Breastfeeding No   BMI 42.74 kg/m      Physical Exam   Constitutional: Awake, alert, cooperative, no apparent distress, and appears stated age.  Eyes: Pupils equal, round and reactive to light, extra ocular muscles intact, sclera clear, conjunctiva normal.  HENT: Normocephalic, oral pharynx with moist mucus membranes, good dentition. No goiter appreciated.   Respiratory: Clear to auscultation bilaterally, no crackles or wheezing.  Cardiovascular: Regular rate and rhythm, normal S1 and S2, and no murmur noted.  Carotids +2, no bruits. No edema. Palpable pulses to radial  DP and PT arteries.   GI: Normal bowel sounds, soft, non-distended, non-tender, no masses palpated, no hepatosplenomegaly.  Lymph/Hematologic: No cervical lymphadenopathy and no supraclavicular lymphadenopathy.  Genitourinary:  deferred  Skin: Warm and dry.  No rashes at anticipated surgical site.   Musculoskeletal: Full ROM of neck. There is no redness, warmth, or swelling of the joints. Gross motor strength is normal.  "   Neurologic: Awake, alert, oriented to name, place and time. Cranial nerves II-XII are grossly intact. Gait is normal.   Neuropsychiatric: Calm, cooperative. Normal affect.     Prior Labs/Diagnostic Studies     No results found.    The patient's records and results personally reviewed by this provider.     [facility]  EKG 2009: normal      LAB/DIAGNOSTIC STUDIES TODAY:  Type and screen on hold by surgeon - will order today, along with CBC, BMP    Assessment      Zaida Lao is a 47 year old female seen as a PAC referral for risk assessment and optimization for anesthesia.    Plan/Recommendations  Pt will be optimized for the proposed procedure.  See below for details on the assessment, risk, and preoperative recommendations    NEUROLOGY  - No history of TIA, CVA or seizure  -Post Op delirium risk factors:  No risk identified    ENT  - No current airway concerns.  Will need to be reassessed day of surgery.  Mallampati: IV  TM: > 3    CARDIAC  - Hypertension.   Well controlled on Lisinopril. No CP, MUNIZ, palpitations.  - METS (Metabolic Equivalents)  Patient performs 4 or more METS exercise without symptoms      RCRI-Very low risk: Class 1:  0.4% complication rate for major adverse cardiac events           PULMONARY  LOC diagnosed prior to 2020 and patient put on CPAP. She could not tolerate after 3 months and saw an ENT provider. SHe had tonsillectomy and septoplasty and was not tested after this procedure.  Snoring  LOC Low Risk            Total Score: 2    LOC: Hypertension    LOC: BMI over 35 kg/m2      - Denies asthma or inhaler use  - Tobacco History      History   Smoking Status     Former Smoker     Packs/day: 0.00     Years: 10.00     Types: Cigarettes     Start date: 6/1/1994     Quit date: 1/30/2005   Smokeless Tobacco     Never Used     Comment: Social smoker       GI  - no sx of reflux  PONV Medium Risk  Total Score: 2           1 AN  "PONV: Pt is Female    1 AN PONV: Patient is not a current smoker        /RENAL  H/o renal stones and lithotripsy  Recurrent UTIs - last one 3/25/22  Urinalysis   - Baseline Creatinine  Sent to    ENDOCRINE    - BMI: Estimated body mass index is 42.74 kg/m  as calculated from the following:    Height as of this encounter: 1.676 m (5' 6\").    Weight as of this encounter: 120.1 kg (264 lb 12.8 oz).    - No history of Diabetes Mellitus    HEME  VTE Low Risk 0.26%            Total Score: 1    VTE: BMI greater than 39        MSK  Patient is NOT Frail             Patient was discussed with Dr Benitez    The patient is optimized for their procedure. AVS with information on surgery time/arrival time, meds and NPO status given by nursing staff. No further diagnostic testing indicated.      On the day of service:     Prep time: 20 minutes  Visit time: 20 minutes  Documentation time: 20 minutes  ------------------------------------------  Total time: 60 minutes      WALTER Soto  Preoperative Assessment Center  [facility]  Phone: [phone]  Fax: [phone]  "

## 2022-05-02 NOTE — H&P (VIEW-ONLY)
Pre-Operative H & P     CC:  Preoperative exam to assess for increased cardiopulmonary risk while undergoing surgery and anesthesia.    Date of Encounter: 5/2/2022  Primary Care Physician:  Elzbieta Pulido     Reason for visit: pre-operative physical; excessive mentrual bleeding    HPI  Zaida Lao is a 47 year old female who presents for pre-operative H & P in preparation for  Procedure Information     Date/Time: 5/17/22     Procedure: Total Robotic abdominal hysterectomy    Anesthesia type: general    Pre-op diagnosis: menorrhagia; proliferative type endometrium     Location: [facility]    Providers: Dr. Shahida Coyne          This is a 47 year old female who reports heavy periods, with an episode of 3 menses in 50 days in January 2022. She had a negative endometrial biopsy in 2021. She was using a Rolando cup, but had recurrent UTIs so thinking this may be a source she switched to tampons. Since January she has experienced menses every 24 day with 2 heavy days with leakage from tampons. LMP 4/25/22  She has no abdominal pain. She does have a history of recurrent UTIs - last one 3/25/22 S/P course of Nitrofurantoin.    Co morbidities include obesity, HTN, depression  History is obtained from the patient.    Hx of abnormal bleeding or anti-platelet use: as above    Menstrual history: Patient's last menstrual period was 04/25/2022 (exact date).:     Past Medical History  Past Medical History:   Diagnosis Date     Cervical high risk HPV (human papillomavirus) test positive 11/13/2020     Depressive disorder 2000    I take Lexapro     HTN (hypertension)      Human papillomavirus in conditions classified elsewhere and of unspecified site     genital warts 1996, resolved     Mild or unspecified pre-eclampsia, antepartum      LOC (obstructive sleep apnea)- pt feels it resolved since tonsillectomy and septoplasty - no testing after the surgery       Pre-eclampsia      Seasonal allergies     Zyrtec helps and has an Rx for Flonase       Past Surgical History  Past Surgical History:   Procedure Laterality Date      SECTION  2011    Procedure: SECTION; Surgeon:CHRISSY BRITTON; Location:[facility] L[facility]      SECTION  2014    Procedure:  SECTION;  Surgeon: Chrissy Britton MD;  Location: [facility] L[facility]     GYN SURGERY      tubal ligation     HC TOOTH EXTRACTION W/FORCEP      one dry socket     RW DENTAL (ABSTRACTED)       SEPTOPLASTY, TURBINOPLASTY, COMBINED Bilateral 3/9/2020    Procedure: SEPTOPLASTY, BILATERAL INFERIOR TURBINATE REDUCTION;  Surgeon: Jb Nava MD;  Location: [facility] OR     SURGICAL HISTORY OF -       lithotripsy     TONSILLECTOMY, ADENOIDECTOMY, COMBINED Bilateral 3/9/2020    Procedure: BILATERAL TONSILLECTOMY AND ADENOIDECTOMY;  Surgeon: Jb Nava MD;  Location: [facility] OR       Prior to Admission Medications  Current Outpatient Medications   Medication Sig Dispense Refill     albuterol (PROAIR HFA/PROVENTIL HFA/VENTOLIN HFA) 108 (90 Base) MCG/ACT inhaler Inhale 1-2 puffs into the lungs every 6 hours 6.7 g 1     ALPRAZolam (XANAX) 0.25 MG tablet Take 1 tablet (0.25 mg) by mouth daily as needed for anxiety Do not mix with alcohol or drive after taking 30 tablet 3     fluticasone (FLONASE) 50 MCG/ACT spray Spray 1 spray into both nostrils daily       hydrOXYzine (ATARAX) 25 MG tablet TAKE 1-2 TABLETS (25-50 MG) BY MOUTH NIGHTLY AS NEEDED FOR ITCHING 30 tablet 1     lisinopril (ZESTRIL) 5 MG tablet Take 1 tablet (5 mg) by mouth daily (Patient taking differently: Take 5 mg by mouth every morning) 90 tablet 3     sertraline (ZOLOFT) 100 MG tablet Take 2 tablets (200 mg) by mouth daily (Patient taking differently: Take 200 mg by mouth every morning) 180 tablet 3       Allergies  Allergies   Allergen Reactions     Seasonal Allergies      Cold symptoms, stuffy nose, itchy throat and eyes       Social  History  Social History     Socioeconomic History     Marital status:    Occupational History     Occupation: student     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   Tobacco Use     Smoking status: Former Smoker     Packs/day: 0.00     Years: 10.00     Pack years: 0.00     Types: Cigarettes     Start date: 1994     Quit date: 2005     Years since quittin.2     Smokeless tobacco: Never Used     Tobacco comment: Social smoker   Vaping Use     Vaping Use: Never used   Substance and Sexual Activity     Alcohol use: Yes     Comment: Socially     Drug use: Yes     Types: Marijuana     Comment: Gummies 2-3 per month     Sexual activity: Yes     Partners: Male     Birth control/protection: Female Surgical     Comment: Tubal ligation post c section    Other Topics Concern     Parent/sibling w/ CABG, MI or angioplasty before 65F 55M? No     Family History  Family History   Problem Relation Age of Onset     C.A.D. Paternal Grandfather          of MI     Breast Cancer Maternal Grandmother              Cancer - colorectal Maternal Grandmother      Circulatory Maternal Grandmother         aneurism     Prostate Cancer Maternal Grandfather              Eye Surgery Maternal Grandfather         cataracts, late in life     Eye Disorder Father         detached retina     Genitourinary Problems Father         kidney stones     Macular Degeneration Father      Hypertension Father      Nephrolithiasis Father      Cerebrovascular Disease Paternal Grandmother              Glaucoma No family hx of        Review of Systems  The complete review of systems is negative other than noted in the HPI or here.   Anesthesia Evaluation   Pt has had prior anesthetic. Type: General.    History of anesthetic complications  - PONV.      ROS/MED HX  ENT/Pulmonary: Comment: Stopped CPAP after 3 months - she could not tolerate  Referred to ENT  And recommendation was T & A and septoplasty.  Rare snoring now  Quit  "smoking 2005    (+) sleep apnea, doesn't use CPAP, LOC risk factors, hypertension, obese, tobacco use, Past use, 10  Pack-Year Hx,      Neurologic:       Cardiovascular:     (+) hypertension-----Previous cardiac testing     METS/Exercise Tolerance:  Comment: HTN diagnosed in 2020.  On 5 mg Lisinopril - normotensive   Hematologic:  - neg hematologic  ROS     Musculoskeletal:  - neg musculoskeletal ROS     GI/Hepatic:  - neg GI/hepatic ROS     Renal/Genitourinary:     (+) Nephrolithiasis ,     Endo:     (+) Obesity,     Psychiatric/Substance Use:     (+) psychiatric history depression     Infectious Disease:  - neg infectious disease ROS     Malignancy:  - neg malignancy ROS     Other:            /83 (BP Location: Right arm, Patient Position: Sitting, Cuff Size: Adult Large)   Pulse 82   Temp 97.8  F (36.6  C) (Oral)   Resp 16   Ht 1.676 m (5' 6\")   Wt 120.1 kg (264 lb 12.8 oz)   LMP 04/25/2022 (Exact Date)   SpO2 97%   Breastfeeding No   BMI 42.74 kg/m      Physical Exam   Constitutional: Awake, alert, cooperative, no apparent distress, and appears stated age.  Eyes: Pupils equal, round and reactive to light, extra ocular muscles intact, sclera clear, conjunctiva normal.  HENT: Normocephalic, oral pharynx with moist mucus membranes, good dentition. No goiter appreciated.   Respiratory: Clear to auscultation bilaterally, no crackles or wheezing.  Cardiovascular: Regular rate and rhythm, normal S1 and S2, and no murmur noted.  Carotids +2, no bruits. No edema. Palpable pulses to radial  DP and PT arteries.   GI: Normal bowel sounds, soft, non-distended, non-tender, no masses palpated, no hepatosplenomegaly.  Lymph/Hematologic: No cervical lymphadenopathy and no supraclavicular lymphadenopathy.  Genitourinary:  deferred  Skin: Warm and dry.  No rashes at anticipated surgical site.   Musculoskeletal: Full ROM of neck. There is no redness, warmth, or swelling of the joints. Gross motor strength is normal.  "   Neurologic: Awake, alert, oriented to name, place and time. Cranial nerves II-XII are grossly intact. Gait is normal.   Neuropsychiatric: Calm, cooperative. Normal affect.     Prior Labs/Diagnostic Studies     No results found.    The patient's records and results personally reviewed by this provider.     Outside records reviewed from: [facility]- looked at EKG from 2009  EKG 2009: normal      LAB/DIAGNOSTIC STUDIES TODAY:  Type and screen on hold by surgeon - will order today, along with CBC, BMP    Assessment      Zaida Lao is a 47 year old female seen as a PAC referral for risk assessment and optimization for anesthesia.    Plan/Recommendations  Pt will be optimized for the proposed procedure.  See below for details on the assessment, risk, and preoperative recommendations    NEUROLOGY  - No history of TIA, CVA or seizure  -Post Op delirium risk factors:  No risk identified    ENT  - No current airway concerns.  Will need to be reassessed day of surgery.  Mallampati: IV  TM: > 3    CARDIAC  - Hypertension.   Well controlled on Lisinopril. No CP, MUNIZ, palpitations.  - METS (Metabolic Equivalents)  Patient performs 4 or more METS exercise without symptoms      RCRI-Very low risk: Class 1:  0.4% complication rate for major adverse cardiac events           PULMONARY  LOC diagnosed prior to 2020 and patient put on CPAP. She could not tolerate after 3 months and saw an ENT provider. SHe had tonsillectomy and septoplasty and was not tested after this procedure.  Snoring  LOC Low Risk            Total Score: 2    LOC: Hypertension    LOC: BMI over 35 kg/m2      - Denies asthma or inhaler use  - Tobacco History      History   Smoking Status     Former Smoker     Packs/day: 0.00     Years: 10.00     Types: Cigarettes     Start date: 6/1/1994     Quit date: 1/30/2005   Smokeless Tobacco     Never Used     Comment: Social smoker       GI  - no sx of reflux  PONV Medium Risk  Total Score: 2           1 AN  "PONV: Pt is Female    1 AN PONV: Patient is not a current smoker        /RENAL  H/o renal stones and lithotripsy  Recurrent UTIs - last one 3/25/22  Urinalysis   - Baseline Creatinine  Sent to    ENDOCRINE    - BMI: Estimated body mass index is 42.74 kg/m  as calculated from the following:    Height as of this encounter: 1.676 m (5' 6\").    Weight as of this encounter: 120.1 kg (264 lb 12.8 oz).    - No history of Diabetes Mellitus    HEME  VTE Low Risk 0.26%            Total Score: 1    VTE: BMI greater than 39        MSK  Patient is NOT Frail             Patient was discussed with Dr Benitez    The patient is optimized for their procedure. AVS with information on surgery time/arrival time, meds and NPO status given by nursing staff. No further diagnostic testing indicated.      On the day of service:     Prep time: 20 minutes  Visit time: 20 minutes  Documentation time: 20 minutes  ------------------------------------------  Total time: 60 minutes      WALTER Soto  Preoperative Assessment Center  [facility]  Clinic and Surgery Center  Phone: [phone]  Fax: [phone]  "

## 2022-05-02 NOTE — PATIENT INSTRUCTIONS
Preparing for Your Surgery      Name:  Zaida Lao   MRN:  [id number]   :  1975   Today's Date:  2022       Arriving for surgery:  Surgery date:  2022  Arrival time:  7:45 am    Restrictions due to COVID 19       Effective 22 [facility] is implementing the following visitor policy:     1 person may accompany the patient through the Pre-Op process.      That same person may wait in the Surgery Waiting room, provided there is enough room to social distance         Inpatients are allowed 2 visitors per day for the duration of their stay.        Visitors must wear a mask.      Visitors must not be ill.      Visiting hours are 8 am to 8 pm.    [facility] parking is available for anyone with mobility limitations or disabilities.  ([place]  24 hours/ 7 days a week; [facility]  7 am- 3:30 pm, Mon- Fri)    Please come to:     [facility] [address]  [facility]   [address]  [address]    - parking is available in front of [facility] from 5:15AM to 8:00PM. If you prefer, park your car in the Green Lot.    -Proceed to the 3rd floor, check in at the Adult Surgery Waiting Lounge. [phone]    If an escort is needed stop at the Information Desk in the lobby.    What can I eat or drink?  -  You may eat and drink normally for up to 8 hours before your surgery. (Until Midnight)  -  You may have clear liquids until 2 hours before surgery. (Until 7:45 am arrival time)    Examples of clear liquids:  Water  Clear broth  Juices (apple, white grape, white cranberry  and cider) without pulp  Noncarbonated, powder based beverages  (lemonade and Cory-Aid)  Sodas (Sprite, 7-Up, ginger ale and seltzer)  Coffee or tea (without milk or cream)  Gatorade    -  No Alcohol for at least 24 hours before surgery     Which medicines can I take?    Hold Aspirin for 7 days before surgery.   Hold  Multivitamins for 7 days before surgery.  Hold Supplements for 7 days before surgery.  Hold Ibuprofen (Advil, Motrin) for 1 day before surgery--unless otherwise directed by surgeon.  Hold Naproxen (Aleve) for 4 days before surgery.    -  DO NOT take these medications the day of surgery:  Lisinopril      -  PLEASE TAKE these medications the day of surgery:  Inhaler as usual and bring to hospital  Alprazolam if needed   Flonase   Sertraline       How do I prepare myself?  - Please take 2 showers before surgery using Scrubcare or Hibiclens soap.    Use this soap only from the neck to your toes.     Leave the soap on your skin for one minute--then rinse thoroughly.      You may use your own shampoo and conditioner; no other hair products.   - Please remove all jewelry and body piercings.  - No lotions, deodorants or fragrance.  - No makeup or fingernail polish.   - Bring your ID and insurance card.    -If you have a Deep Brain Stimulator, Spinal Cord Stimulator or any neuro stimulator device---you must bring the remote control to the hospital     - All patients are required to have a Covid-19 test within 4 days of surgery/procedure.      -Patients will be contacted by the [facility] scheduling team within 1 week of surgery to make an appointment.      - Patients may call the Scheduling team at [phone] if they have not been scheduled within 4 days of  surgery.      ALL PATIENTS GOING HOME THE SAME DAY OF SURGERY ARE REQUIRED TO HAVE A RESPONSIBLE ADULT TO DRIVE AND BE IN ATTENDANCE WITH THEM FOR 24 HOURS FOLLOWING SURGERY.      Questions or Concerns:    - For any questions regarding the day of surgery or your hospital stay, please contact the Pre Admission Nursing Office at [phone].       - If you have health changes between today and your surgery please call your surgeon.       For questions after surgery please call your surgeons office.

## 2022-05-04 LAB
BLOOD BANK CHART COMMENT: NORMAL
SPECIMEN EXPIRATION DATE: NORMAL

## 2022-05-05 ENCOUNTER — PREP FOR PROCEDURE (OUTPATIENT)
Dept: UROLOGY | Facility: CLINIC | Age: 47
End: 2022-05-05
Payer: COMMERCIAL

## 2022-05-05 NOTE — PROGRESS NOTES
Patient would like Dr. Thurston to address previous issues with a sling. Case mod order placed.    Adri Pennington  Clinical Services Assistant

## 2022-05-13 ENCOUNTER — LAB (OUTPATIENT)
Dept: LAB | Facility: CLINIC | Age: 47
End: 2022-05-13
Attending: OBSTETRICS & GYNECOLOGY
Payer: COMMERCIAL

## 2022-05-13 DIAGNOSIS — Z01.812 ENCOUNTER FOR PRE-OPERATIVE LABORATORY TESTING: ICD-10-CM

## 2022-05-13 PROCEDURE — U0003 INFECTIOUS AGENT DETECTION BY NUCLEIC ACID (DNA OR RNA); SEVERE ACUTE RESPIRATORY SYNDROME CORONAVIRUS 2 (SARS-COV-2) (CORONAVIRUS DISEASE [COVID-19]), AMPLIFIED PROBE TECHNIQUE, MAKING USE OF HIGH THROUGHPUT TECHNOLOGIES AS DESCRIBED BY CMS-2020-01-R: HCPCS

## 2022-05-13 PROCEDURE — U0005 INFEC AGEN DETEC AMPLI PROBE: HCPCS

## 2022-05-14 LAB — SARS-COV-2 RNA RESP QL NAA+PROBE: NEGATIVE

## 2022-05-16 ENCOUNTER — ANESTHESIA EVENT (OUTPATIENT)
Dept: SURGERY | Facility: CLINIC | Age: 47
DRG: 742 | End: 2022-05-16
Payer: COMMERCIAL

## 2022-05-17 ENCOUNTER — ANESTHESIA (OUTPATIENT)
Dept: SURGERY | Facility: CLINIC | Age: 47
DRG: 742 | End: 2022-05-17
Payer: COMMERCIAL

## 2022-05-17 ENCOUNTER — HOSPITAL ENCOUNTER (INPATIENT)
Facility: CLINIC | Age: 47
LOS: 1 days | Discharge: HOME | DRG: 742 | End: 2022-05-19
Attending: OBSTETRICS & GYNECOLOGY | Admitting: OBSTETRICS & GYNECOLOGY
Payer: COMMERCIAL

## 2022-05-17 DIAGNOSIS — Z90.710 S/P LAPAROSCOPIC HYSTERECTOMY: Primary | ICD-10-CM

## 2022-05-17 LAB
ABO/RH(D): NORMAL
ANTIBODY SCREEN: NEGATIVE
B-HCG SERPL-ACNC: <1 IU/L (ref 0–5)
BASOPHILS # BLD AUTO: 0 10E3/UL (ref 0–0.2)
BASOPHILS NFR BLD AUTO: 0 %
EOSINOPHIL # BLD AUTO: 0.3 10E3/UL (ref 0–0.7)
EOSINOPHIL NFR BLD AUTO: 3 %
ERYTHROCYTE [DISTWIDTH] IN BLOOD BY AUTOMATED COUNT: 13.3 % (ref 10–15)
GLUCOSE BLDC GLUCOMTR-MCNC: 100 MG/DL (ref 70–99)
HCT VFR BLD AUTO: 42.3 % (ref 35–47)
HGB BLD-MCNC: 13.9 G/DL (ref 11.7–15.7)
IMM GRANULOCYTES # BLD: 0.1 10E3/UL
IMM GRANULOCYTES NFR BLD: 1 %
LYMPHOCYTES # BLD AUTO: 2 10E3/UL (ref 0.8–5.3)
LYMPHOCYTES NFR BLD AUTO: 21 %
MCH RBC QN AUTO: 28.8 PG (ref 26.5–33)
MCHC RBC AUTO-ENTMCNC: 32.9 G/DL (ref 31.5–36.5)
MCV RBC AUTO: 88 FL (ref 78–100)
MONOCYTES # BLD AUTO: 1 10E3/UL (ref 0–1.3)
MONOCYTES NFR BLD AUTO: 10 %
NEUTROPHILS # BLD AUTO: 6 10E3/UL (ref 1.6–8.3)
NEUTROPHILS NFR BLD AUTO: 65 %
NRBC # BLD AUTO: 0 10E3/UL
NRBC BLD AUTO-RTO: 0 /100
PLATELET # BLD AUTO: 314 10E3/UL (ref 150–450)
RBC # BLD AUTO: 4.82 10E6/UL (ref 3.8–5.2)
SPECIMEN EXPIRATION DATE: NORMAL
WBC # BLD AUTO: 9.4 10E3/UL (ref 4–11)

## 2022-05-17 PROCEDURE — 250N000013 HC RX MED GY IP 250 OP 250 PS 637: Performed by: STUDENT IN AN ORGANIZED HEALTH CARE EDUCATION/TRAINING PROGRAM

## 2022-05-17 PROCEDURE — 0UT94ZZ RESECTION OF UTERUS, PERCUTANEOUS ENDOSCOPIC APPROACH: ICD-10-PCS | Performed by: OBSTETRICS & GYNECOLOGY

## 2022-05-17 PROCEDURE — 36415 COLL VENOUS BLD VENIPUNCTURE: CPT | Performed by: OBSTETRICS & GYNECOLOGY

## 2022-05-17 PROCEDURE — 250N000011 HC RX IP 250 OP 636: Performed by: OBSTETRICS & GYNECOLOGY

## 2022-05-17 PROCEDURE — 250N000013 HC RX MED GY IP 250 OP 250 PS 637: Performed by: ANESTHESIOLOGY

## 2022-05-17 PROCEDURE — 8E0W4CZ ROBOTIC ASSISTED PROCEDURE OF TRUNK REGION, PERCUTANEOUS ENDOSCOPIC APPROACH: ICD-10-PCS | Performed by: OBSTETRICS & GYNECOLOGY

## 2022-05-17 PROCEDURE — 84702 CHORIONIC GONADOTROPIN TEST: CPT | Performed by: OBSTETRICS & GYNECOLOGY

## 2022-05-17 PROCEDURE — C1765 ADHESION BARRIER: HCPCS | Performed by: OBSTETRICS & GYNECOLOGY

## 2022-05-17 PROCEDURE — 250N000011 HC RX IP 250 OP 636: Performed by: NURSE ANESTHETIST, CERTIFIED REGISTERED

## 2022-05-17 PROCEDURE — 85025 COMPLETE CBC W/AUTO DIFF WBC: CPT | Performed by: OBSTETRICS & GYNECOLOGY

## 2022-05-17 PROCEDURE — 250N000013 HC RX MED GY IP 250 OP 250 PS 637: Performed by: OBSTETRICS & GYNECOLOGY

## 2022-05-17 PROCEDURE — 250N000024 HC ISOFLURANE, PER MIN: Performed by: OBSTETRICS & GYNECOLOGY

## 2022-05-17 PROCEDURE — 0TJD8ZZ INSPECTION OF URETHRA, VIA NATURAL OR ARTIFICIAL OPENING ENDOSCOPIC: ICD-10-PCS | Performed by: OBSTETRICS & GYNECOLOGY

## 2022-05-17 PROCEDURE — 250N000011 HC RX IP 250 OP 636: Performed by: ANESTHESIOLOGY

## 2022-05-17 PROCEDURE — 86901 BLOOD TYPING SEROLOGIC RH(D): CPT | Performed by: OBSTETRICS & GYNECOLOGY

## 2022-05-17 PROCEDURE — 710N000012 HC RECOVERY PHASE 2, PER MINUTE: Performed by: OBSTETRICS & GYNECOLOGY

## 2022-05-17 PROCEDURE — 88307 TISSUE EXAM BY PATHOLOGIST: CPT | Mod: TC | Performed by: OBSTETRICS & GYNECOLOGY

## 2022-05-17 PROCEDURE — 250N000009 HC RX 250: Performed by: OBSTETRICS & GYNECOLOGY

## 2022-05-17 PROCEDURE — 999N000141 HC STATISTIC PRE-PROCEDURE NURSING ASSESSMENT: Performed by: OBSTETRICS & GYNECOLOGY

## 2022-05-17 PROCEDURE — 57288 REPAIR BLADDER DEFECT: CPT | Performed by: OBSTETRICS & GYNECOLOGY

## 2022-05-17 PROCEDURE — 250N000009 HC RX 250: Performed by: ANESTHESIOLOGY

## 2022-05-17 PROCEDURE — 0UT74ZZ RESECTION OF BILATERAL FALLOPIAN TUBES, PERCUTANEOUS ENDOSCOPIC APPROACH: ICD-10-PCS | Performed by: OBSTETRICS & GYNECOLOGY

## 2022-05-17 PROCEDURE — 0TSD4ZZ REPOSITION URETHRA, PERCUTANEOUS ENDOSCOPIC APPROACH: ICD-10-PCS | Performed by: OBSTETRICS & GYNECOLOGY

## 2022-05-17 PROCEDURE — 86850 RBC ANTIBODY SCREEN: CPT | Performed by: OBSTETRICS & GYNECOLOGY

## 2022-05-17 PROCEDURE — 710N000010 HC RECOVERY PHASE 1, LEVEL 2, PER MIN: Performed by: OBSTETRICS & GYNECOLOGY

## 2022-05-17 PROCEDURE — 258N000003 HC RX IP 258 OP 636: Performed by: NURSE ANESTHETIST, CERTIFIED REGISTERED

## 2022-05-17 PROCEDURE — 360N000080 HC SURGERY LEVEL 7, PER MIN: Performed by: OBSTETRICS & GYNECOLOGY

## 2022-05-17 PROCEDURE — 272N000001 HC OR GENERAL SUPPLY STERILE: Performed by: OBSTETRICS & GYNECOLOGY

## 2022-05-17 PROCEDURE — 88307 TISSUE EXAM BY PATHOLOGIST: CPT | Mod: 26 | Performed by: PATHOLOGY

## 2022-05-17 PROCEDURE — 370N000017 HC ANESTHESIA TECHNICAL FEE, PER MIN: Performed by: OBSTETRICS & GYNECOLOGY

## 2022-05-17 PROCEDURE — C1771 REP DEV, URINARY, W/SLING: HCPCS | Performed by: OBSTETRICS & GYNECOLOGY

## 2022-05-17 PROCEDURE — 250N000009 HC RX 250: Performed by: NURSE ANESTHETIST, CERTIFIED REGISTERED

## 2022-05-17 PROCEDURE — 58571 TLH W/T/O 250 G OR LESS: CPT | Mod: GC | Performed by: OBSTETRICS & GYNECOLOGY

## 2022-05-17 PROCEDURE — 82962 GLUCOSE BLOOD TEST: CPT

## 2022-05-17 DEVICE — TRANSVAGINAL MID-URETHRAL SLING
Type: IMPLANTABLE DEVICE | Site: VAGINA | Status: FUNCTIONAL
Brand: ADVANTAGE FIT™  SYSTEM

## 2022-05-17 RX ORDER — OXYCODONE HYDROCHLORIDE 5 MG/1
5 TABLET ORAL
Status: DISCONTINUED | OUTPATIENT
Start: 2022-05-17 | End: 2022-05-17

## 2022-05-17 RX ORDER — PHENAZOPYRIDINE HYDROCHLORIDE 100 MG/1
100 TABLET, FILM COATED ORAL ONCE
Status: COMPLETED | OUTPATIENT
Start: 2022-05-17 | End: 2022-05-17

## 2022-05-17 RX ORDER — PROCHLORPERAZINE MALEATE 10 MG
10 TABLET ORAL EVERY 6 HOURS PRN
Status: DISCONTINUED | OUTPATIENT
Start: 2022-05-17 | End: 2022-05-19 | Stop reason: HOSPADM

## 2022-05-17 RX ORDER — LIDOCAINE 40 MG/G
CREAM TOPICAL
Status: DISCONTINUED | OUTPATIENT
Start: 2022-05-17 | End: 2022-05-19 | Stop reason: HOSPADM

## 2022-05-17 RX ORDER — LEVOCETIRIZINE DIHYDROCHLORIDE 5 MG/1
1 TABLET, FILM COATED ORAL DAILY
COMMUNITY

## 2022-05-17 RX ORDER — LABETALOL HYDROCHLORIDE 5 MG/ML
10 INJECTION, SOLUTION INTRAVENOUS
Status: DISCONTINUED | OUTPATIENT
Start: 2022-05-17 | End: 2022-05-17

## 2022-05-17 RX ORDER — ONDANSETRON 4 MG/1
4 TABLET, ORALLY DISINTEGRATING ORAL EVERY 30 MIN PRN
Status: DISCONTINUED | OUTPATIENT
Start: 2022-05-17 | End: 2022-05-17

## 2022-05-17 RX ORDER — CEFAZOLIN SODIUM/WATER 2 G/20 ML
2 SYRINGE (ML) INTRAVENOUS
Status: COMPLETED | OUTPATIENT
Start: 2022-05-17 | End: 2022-05-17

## 2022-05-17 RX ORDER — SODIUM CHLORIDE, SODIUM LACTATE, POTASSIUM CHLORIDE, CALCIUM CHLORIDE 600; 310; 30; 20 MG/100ML; MG/100ML; MG/100ML; MG/100ML
INJECTION, SOLUTION INTRAVENOUS CONTINUOUS PRN
Status: DISCONTINUED | OUTPATIENT
Start: 2022-05-17 | End: 2022-05-17

## 2022-05-17 RX ORDER — IBUPROFEN 600 MG/1
600 TABLET, FILM COATED ORAL EVERY 6 HOURS PRN
Qty: 30 TABLET | Refills: 0 | COMMUNITY
Start: 2022-05-17 | End: 2023-10-02

## 2022-05-17 RX ORDER — KETOROLAC TROMETHAMINE 30 MG/ML
INJECTION, SOLUTION INTRAMUSCULAR; INTRAVENOUS PRN
Status: DISCONTINUED | OUTPATIENT
Start: 2022-05-17 | End: 2022-05-17

## 2022-05-17 RX ORDER — AMOXICILLIN 250 MG
1 CAPSULE ORAL 2 TIMES DAILY
Status: DISCONTINUED | OUTPATIENT
Start: 2022-05-17 | End: 2022-05-19 | Stop reason: HOSPADM

## 2022-05-17 RX ORDER — ACETAMINOPHEN 325 MG/1
650 TABLET ORAL EVERY 6 HOURS
Status: DISCONTINUED | OUTPATIENT
Start: 2022-05-21 | End: 2022-05-19 | Stop reason: HOSPADM

## 2022-05-17 RX ORDER — FENTANYL CITRATE 50 UG/ML
25 INJECTION, SOLUTION INTRAMUSCULAR; INTRAVENOUS
Status: DISCONTINUED | OUTPATIENT
Start: 2022-05-17 | End: 2022-05-17

## 2022-05-17 RX ORDER — NALOXONE HYDROCHLORIDE 0.4 MG/ML
0.4 INJECTION, SOLUTION INTRAMUSCULAR; INTRAVENOUS; SUBCUTANEOUS
Status: DISCONTINUED | OUTPATIENT
Start: 2022-05-17 | End: 2022-05-19 | Stop reason: HOSPADM

## 2022-05-17 RX ORDER — OXYCODONE HYDROCHLORIDE 10 MG/1
10 TABLET ORAL EVERY 4 HOURS PRN
Status: DISCONTINUED | OUTPATIENT
Start: 2022-05-17 | End: 2022-05-19 | Stop reason: HOSPADM

## 2022-05-17 RX ORDER — OXYCODONE HYDROCHLORIDE 5 MG/1
5 TABLET ORAL EVERY 6 HOURS PRN
Qty: 6 TABLET | Refills: 0 | Status: SHIPPED | OUTPATIENT
Start: 2022-05-17 | End: 2022-05-20

## 2022-05-17 RX ORDER — ACETAMINOPHEN 325 MG/1
975 TABLET ORAL EVERY 6 HOURS
Status: DISCONTINUED | OUTPATIENT
Start: 2022-05-18 | End: 2022-05-19 | Stop reason: HOSPADM

## 2022-05-17 RX ORDER — ACETAMINOPHEN 325 MG/1
975 TABLET ORAL ONCE
Status: COMPLETED | OUTPATIENT
Start: 2022-05-17 | End: 2022-05-17

## 2022-05-17 RX ORDER — DEXAMETHASONE SODIUM PHOSPHATE 10 MG/ML
INJECTION, SOLUTION INTRAMUSCULAR; INTRAVENOUS
Status: DISCONTINUED | OUTPATIENT
Start: 2022-05-17 | End: 2022-05-17

## 2022-05-17 RX ORDER — ACETAMINOPHEN 325 MG/1
650 TABLET ORAL EVERY 6 HOURS PRN
Qty: 50 TABLET | Refills: 0 | COMMUNITY
Start: 2022-05-17 | End: 2023-10-02

## 2022-05-17 RX ORDER — SERTRALINE HYDROCHLORIDE 100 MG/1
200 TABLET, FILM COATED ORAL DAILY
Status: DISCONTINUED | OUTPATIENT
Start: 2022-05-18 | End: 2022-05-19 | Stop reason: HOSPADM

## 2022-05-17 RX ORDER — LIDOCAINE HYDROCHLORIDE 20 MG/ML
INJECTION, SOLUTION INFILTRATION; PERINEURAL PRN
Status: DISCONTINUED | OUTPATIENT
Start: 2022-05-17 | End: 2022-05-17

## 2022-05-17 RX ORDER — LIDOCAINE HYDROCHLORIDE AND EPINEPHRINE 10; 10 MG/ML; UG/ML
INJECTION, SOLUTION INFILTRATION; PERINEURAL PRN
Status: DISCONTINUED | OUTPATIENT
Start: 2022-05-17 | End: 2022-05-17 | Stop reason: HOSPADM

## 2022-05-17 RX ORDER — HYDROMORPHONE HCL IN WATER/PF 6 MG/30 ML
0.2 PATIENT CONTROLLED ANALGESIA SYRINGE INTRAVENOUS EVERY 5 MIN PRN
Status: DISCONTINUED | OUTPATIENT
Start: 2022-05-17 | End: 2022-05-17

## 2022-05-17 RX ORDER — KETOROLAC TROMETHAMINE 15 MG/ML
15 INJECTION, SOLUTION INTRAMUSCULAR; INTRAVENOUS EVERY 6 HOURS
Status: DISCONTINUED | OUTPATIENT
Start: 2022-05-18 | End: 2022-05-19 | Stop reason: HOSPADM

## 2022-05-17 RX ORDER — DEXAMETHASONE SODIUM PHOSPHATE 4 MG/ML
INJECTION, SOLUTION INTRA-ARTICULAR; INTRALESIONAL; INTRAMUSCULAR; INTRAVENOUS; SOFT TISSUE PRN
Status: DISCONTINUED | OUTPATIENT
Start: 2022-05-17 | End: 2022-05-17

## 2022-05-17 RX ORDER — FENTANYL CITRATE 50 UG/ML
INJECTION, SOLUTION INTRAMUSCULAR; INTRAVENOUS PRN
Status: DISCONTINUED | OUTPATIENT
Start: 2022-05-17 | End: 2022-05-17

## 2022-05-17 RX ORDER — SCOLOPAMINE TRANSDERMAL SYSTEM 1 MG/1
1 PATCH, EXTENDED RELEASE TRANSDERMAL ONCE
Status: COMPLETED | OUTPATIENT
Start: 2022-05-17 | End: 2022-05-18

## 2022-05-17 RX ORDER — NALOXONE HYDROCHLORIDE 0.4 MG/ML
0.2 INJECTION, SOLUTION INTRAMUSCULAR; INTRAVENOUS; SUBCUTANEOUS
Status: DISCONTINUED | OUTPATIENT
Start: 2022-05-17 | End: 2022-05-19 | Stop reason: HOSPADM

## 2022-05-17 RX ORDER — OXYCODONE HYDROCHLORIDE 5 MG/1
5 TABLET ORAL EVERY 4 HOURS PRN
Status: DISCONTINUED | OUTPATIENT
Start: 2022-05-17 | End: 2022-05-17

## 2022-05-17 RX ORDER — ALBUTEROL SULFATE 90 UG/1
1-2 AEROSOL, METERED RESPIRATORY (INHALATION) EVERY 6 HOURS PRN
Status: DISCONTINUED | OUTPATIENT
Start: 2022-05-17 | End: 2022-05-19 | Stop reason: HOSPADM

## 2022-05-17 RX ORDER — FLUTICASONE PROPIONATE 50 MCG
1 SPRAY, SUSPENSION (ML) NASAL DAILY
Status: DISCONTINUED | OUTPATIENT
Start: 2022-05-18 | End: 2022-05-19 | Stop reason: HOSPADM

## 2022-05-17 RX ORDER — SODIUM CHLORIDE, SODIUM LACTATE, POTASSIUM CHLORIDE, CALCIUM CHLORIDE 600; 310; 30; 20 MG/100ML; MG/100ML; MG/100ML; MG/100ML
INJECTION, SOLUTION INTRAVENOUS CONTINUOUS
Status: DISCONTINUED | OUTPATIENT
Start: 2022-05-17 | End: 2022-05-17

## 2022-05-17 RX ORDER — DIMENHYDRINATE 50 MG/ML
25 INJECTION, SOLUTION INTRAMUSCULAR; INTRAVENOUS
Status: DISCONTINUED | OUTPATIENT
Start: 2022-05-17 | End: 2022-05-17

## 2022-05-17 RX ORDER — SENNA AND DOCUSATE SODIUM 50; 8.6 MG/1; MG/1
1-2 TABLET, FILM COATED ORAL 2 TIMES DAILY PRN
Qty: 60 TABLET | Refills: 0 | Status: SHIPPED | OUTPATIENT
Start: 2022-05-17 | End: 2022-06-06

## 2022-05-17 RX ORDER — PROPOFOL 10 MG/ML
INJECTION, EMULSION INTRAVENOUS CONTINUOUS PRN
Status: DISCONTINUED | OUTPATIENT
Start: 2022-05-17 | End: 2022-05-17

## 2022-05-17 RX ORDER — HYDROMORPHONE HCL IN WATER/PF 6 MG/30 ML
0.2 PATIENT CONTROLLED ANALGESIA SYRINGE INTRAVENOUS
Status: DISCONTINUED | OUTPATIENT
Start: 2022-05-17 | End: 2022-05-19 | Stop reason: HOSPADM

## 2022-05-17 RX ORDER — ONDANSETRON 2 MG/ML
INJECTION INTRAMUSCULAR; INTRAVENOUS PRN
Status: DISCONTINUED | OUTPATIENT
Start: 2022-05-17 | End: 2022-05-17

## 2022-05-17 RX ORDER — OXYCODONE HYDROCHLORIDE 5 MG/1
5 TABLET ORAL EVERY 4 HOURS PRN
Status: DISCONTINUED | OUTPATIENT
Start: 2022-05-17 | End: 2022-05-19 | Stop reason: HOSPADM

## 2022-05-17 RX ORDER — BISACODYL 10 MG
10 SUPPOSITORY, RECTAL RECTAL DAILY PRN
Status: DISCONTINUED | OUTPATIENT
Start: 2022-05-17 | End: 2022-05-19 | Stop reason: HOSPADM

## 2022-05-17 RX ORDER — ONDANSETRON 4 MG/1
4 TABLET, ORALLY DISINTEGRATING ORAL EVERY 6 HOURS PRN
Status: DISCONTINUED | OUTPATIENT
Start: 2022-05-17 | End: 2022-05-19 | Stop reason: HOSPADM

## 2022-05-17 RX ORDER — ONDANSETRON 2 MG/ML
4 INJECTION INTRAMUSCULAR; INTRAVENOUS EVERY 30 MIN PRN
Status: DISCONTINUED | OUTPATIENT
Start: 2022-05-17 | End: 2022-05-17

## 2022-05-17 RX ORDER — CEFAZOLIN SODIUM/WATER 2 G/20 ML
2 SYRINGE (ML) INTRAVENOUS SEE ADMIN INSTRUCTIONS
Status: DISCONTINUED | OUTPATIENT
Start: 2022-05-17 | End: 2022-05-17 | Stop reason: HOSPADM

## 2022-05-17 RX ORDER — ONDANSETRON 2 MG/ML
4 INJECTION INTRAMUSCULAR; INTRAVENOUS EVERY 6 HOURS PRN
Status: DISCONTINUED | OUTPATIENT
Start: 2022-05-17 | End: 2022-05-19 | Stop reason: HOSPADM

## 2022-05-17 RX ORDER — PROPOFOL 10 MG/ML
INJECTION, EMULSION INTRAVENOUS PRN
Status: DISCONTINUED | OUTPATIENT
Start: 2022-05-17 | End: 2022-05-17

## 2022-05-17 RX ORDER — FENTANYL CITRATE 50 UG/ML
25 INJECTION, SOLUTION INTRAMUSCULAR; INTRAVENOUS EVERY 5 MIN PRN
Status: DISCONTINUED | OUTPATIENT
Start: 2022-05-17 | End: 2022-05-17

## 2022-05-17 RX ORDER — BUPIVACAINE HYDROCHLORIDE 2.5 MG/ML
INJECTION, SOLUTION EPIDURAL; INFILTRATION; INTRACAUDAL
Status: DISCONTINUED | OUTPATIENT
Start: 2022-05-17 | End: 2022-05-17

## 2022-05-17 RX ORDER — DEXMEDETOMIDINE HYDROCHLORIDE 4 UG/ML
INJECTION, SOLUTION INTRAVENOUS
Status: DISCONTINUED | OUTPATIENT
Start: 2022-05-17 | End: 2022-05-17

## 2022-05-17 RX ADMIN — FENTANYL CITRATE 25 MCG: 50 INJECTION, SOLUTION INTRAMUSCULAR; INTRAVENOUS at 13:11

## 2022-05-17 RX ADMIN — FENTANYL CITRATE 25 MCG: 50 INJECTION, SOLUTION INTRAMUSCULAR; INTRAVENOUS at 13:24

## 2022-05-17 RX ADMIN — Medication 2 G: at 08:16

## 2022-05-17 RX ADMIN — PROPOFOL 200 MG: 10 INJECTION, EMULSION INTRAVENOUS at 08:21

## 2022-05-17 RX ADMIN — BUPIVACAINE HYDROCHLORIDE 50 ML: 2.5 INJECTION, SOLUTION EPIDURAL; INFILTRATION; INTRACAUDAL at 13:35

## 2022-05-17 RX ADMIN — HYDROMORPHONE HYDROCHLORIDE 0.5 MG: 1 INJECTION, SOLUTION INTRAMUSCULAR; INTRAVENOUS; SUBCUTANEOUS at 10:48

## 2022-05-17 RX ADMIN — ROCURONIUM BROMIDE 20 MG: 50 INJECTION, SOLUTION INTRAVENOUS at 09:47

## 2022-05-17 RX ADMIN — PHENYLEPHRINE HYDROCHLORIDE 100 MCG: 10 INJECTION INTRAVENOUS at 09:11

## 2022-05-17 RX ADMIN — FENTANYL CITRATE 25 MCG: 50 INJECTION, SOLUTION INTRAMUSCULAR; INTRAVENOUS at 13:06

## 2022-05-17 RX ADMIN — FENTANYL CITRATE 25 MCG: 50 INJECTION, SOLUTION INTRAMUSCULAR; INTRAVENOUS at 12:46

## 2022-05-17 RX ADMIN — FENTANYL CITRATE 25 MCG: 50 INJECTION, SOLUTION INTRAMUSCULAR; INTRAVENOUS at 09:06

## 2022-05-17 RX ADMIN — PROPOFOL 30 MCG/KG/MIN: 10 INJECTION, EMULSION INTRAVENOUS at 10:12

## 2022-05-17 RX ADMIN — PROPOFOL 30 MCG/KG/MIN: 10 INJECTION, EMULSION INTRAVENOUS at 08:40

## 2022-05-17 RX ADMIN — LIDOCAINE HYDROCHLORIDE 100 MG: 20 INJECTION, SOLUTION INFILTRATION; PERINEURAL at 08:21

## 2022-05-17 RX ADMIN — DEXAMETHASONE SODIUM PHOSPHATE 6 MG: 4 INJECTION, SOLUTION INTRAMUSCULAR; INTRAVENOUS at 09:23

## 2022-05-17 RX ADMIN — ACETAMINOPHEN 975 MG: 325 TABLET ORAL at 07:24

## 2022-05-17 RX ADMIN — MIDAZOLAM 2 MG: 1 INJECTION INTRAMUSCULAR; INTRAVENOUS at 08:14

## 2022-05-17 RX ADMIN — ROCURONIUM BROMIDE 10 MG: 50 INJECTION, SOLUTION INTRAVENOUS at 11:20

## 2022-05-17 RX ADMIN — ONDANSETRON 4 MG: 2 INJECTION INTRAMUSCULAR; INTRAVENOUS at 11:24

## 2022-05-17 RX ADMIN — FENTANYL CITRATE 100 MCG: 50 INJECTION, SOLUTION INTRAMUSCULAR; INTRAVENOUS at 08:21

## 2022-05-17 RX ADMIN — DEXAMETHASONE SODIUM PHOSPHATE 2 MG: 10 INJECTION, SOLUTION INTRAMUSCULAR; INTRAVENOUS at 13:35

## 2022-05-17 RX ADMIN — ROCURONIUM BROMIDE 20 MG: 50 INJECTION, SOLUTION INTRAVENOUS at 09:06

## 2022-05-17 RX ADMIN — SODIUM CHLORIDE, POTASSIUM CHLORIDE, SODIUM LACTATE AND CALCIUM CHLORIDE: 600; 310; 30; 20 INJECTION, SOLUTION INTRAVENOUS at 10:27

## 2022-05-17 RX ADMIN — SENNOSIDES AND DOCUSATE SODIUM 1 TABLET: 50; 8.6 TABLET ORAL at 22:50

## 2022-05-17 RX ADMIN — FENTANYL CITRATE 25 MCG: 50 INJECTION, SOLUTION INTRAMUSCULAR; INTRAVENOUS at 10:47

## 2022-05-17 RX ADMIN — IBUPROFEN 800 MG: 600 TABLET ORAL at 19:12

## 2022-05-17 RX ADMIN — PHENYLEPHRINE HYDROCHLORIDE 100 MCG: 10 INJECTION INTRAVENOUS at 08:37

## 2022-05-17 RX ADMIN — FENTANYL CITRATE 25 MCG: 50 INJECTION, SOLUTION INTRAMUSCULAR; INTRAVENOUS at 11:33

## 2022-05-17 RX ADMIN — ROCURONIUM BROMIDE 50 MG: 50 INJECTION, SOLUTION INTRAVENOUS at 08:22

## 2022-05-17 RX ADMIN — PROPOFOL 40 MG: 10 INJECTION, EMULSION INTRAVENOUS at 11:53

## 2022-05-17 RX ADMIN — SUGAMMADEX 400 MG: 100 INJECTION, SOLUTION INTRAVENOUS at 11:55

## 2022-05-17 RX ADMIN — SCOPALAMINE 1 PATCH: 1 PATCH, EXTENDED RELEASE TRANSDERMAL at 07:51

## 2022-05-17 RX ADMIN — OXYCODONE HYDROCHLORIDE 5 MG: 5 TABLET ORAL at 22:51

## 2022-05-17 RX ADMIN — SODIUM CHLORIDE, POTASSIUM CHLORIDE, SODIUM LACTATE AND CALCIUM CHLORIDE: 600; 310; 30; 20 INJECTION, SOLUTION INTRAVENOUS at 08:15

## 2022-05-17 RX ADMIN — DEXMEDETOMIDINE 40 MCG: 100 INJECTION, SOLUTION, CONCENTRATE INTRAVENOUS at 13:35

## 2022-05-17 RX ADMIN — ACETAMINOPHEN 975 MG: 325 TABLET ORAL at 16:23

## 2022-05-17 RX ADMIN — HYDROMORPHONE HYDROCHLORIDE 0.3 MG: 1 INJECTION, SOLUTION INTRAMUSCULAR; INTRAVENOUS; SUBCUTANEOUS at 11:34

## 2022-05-17 RX ADMIN — ROCURONIUM BROMIDE 20 MG: 50 INJECTION, SOLUTION INTRAVENOUS at 10:27

## 2022-05-17 RX ADMIN — KETOROLAC TROMETHAMINE 30 MG: 30 INJECTION, SOLUTION INTRAMUSCULAR at 11:40

## 2022-05-17 RX ADMIN — PHENYLEPHRINE HYDROCHLORIDE 100 MCG: 10 INJECTION INTRAVENOUS at 09:42

## 2022-05-17 RX ADMIN — FENTANYL CITRATE 50 MCG: 50 INJECTION, SOLUTION INTRAMUSCULAR; INTRAVENOUS at 09:27

## 2022-05-17 RX ADMIN — PHENYLEPHRINE HYDROCHLORIDE 100 MCG: 10 INJECTION INTRAVENOUS at 09:16

## 2022-05-17 RX ADMIN — PHENAZOPYRIDINE HYDROCHLORIDE 100 MG: 100 TABLET, FILM COATED ORAL at 08:10

## 2022-05-17 RX ADMIN — OXYCODONE HYDROCHLORIDE 5 MG: 5 TABLET ORAL at 13:57

## 2022-05-17 RX ADMIN — PHENYLEPHRINE HYDROCHLORIDE 100 MCG: 10 INJECTION INTRAVENOUS at 08:40

## 2022-05-17 ASSESSMENT — LIFESTYLE VARIABLES: TOBACCO_USE: 1

## 2022-05-17 NOTE — ANESTHESIA PROCEDURE NOTES
Airway       Patient location during procedure: OR       Procedure Start/Stop Times: 5/17/2022 8:26 AM  Staff -        CRNA: Fabi Aguero APRN CRNA       Performed By: CRNA  Consent for Airway        Urgency: elective  Indications and Patient Condition       Indications for airway management: carri-procedural       Induction type:intravenous       Mask difficulty assessment: 1 - vent by mask    Final Airway Details       Final airway type: endotracheal airway       Successful airway: Oral and ETT - single  Endotracheal Airway Details        ETT size (mm): 7.0       Successful intubation technique: direct laryngoscopy       DL Blade Type: Griffith 2       Grade View of Cords: 1       Adjucts: stylet       Position: Right       Measured from: gums/teeth       Secured at (cm): 21       Bite block used: None    Post intubation assessment        Placement verified by: capnometry, equal breath sounds and chest rise        Number of attempts at approach: 1       Ease of procedure: easy       Dentition: Intact and Unchanged    Medication(s) Administered   Medication Administration Time: 5/17/2022 8:26 AM

## 2022-05-17 NOTE — ANESTHESIA CARE TRANSFER NOTE
Patient: Zaida Lao    Procedure: Procedure(s):  CREATION, VAGINAL SLING, WITH CYSTOSCOPY  HYSTERECTOMY, TOTAL, ROBOT-ASSISTED, WITH BILATERAL SALPINGECTOMY, CYSTOSCOPY       Diagnosis: Class 3 severe obesity with serious comorbidity and body mass index (BMI) of 40.0 to 44.9 in adult, unspecified obesity type (H) [E66.01, Z68.41]  Diagnosis Additional Information: No value filed.    Anesthesia Type:   General     Note:    Oropharynx: spontaneously breathing  Level of Consciousness: awake and drowsy  Oxygen Supplementation: face mask    Independent Airway: airway patency satisfactory and stable  Dentition: dentition unchanged      Patient transferred to: PACU    Handoff Report: Identifed the Patient, Identified the Reponsible Provider, Reviewed the pertinent medical history, Discussed the surgical course, Reviewed Intra-OP anesthesia mangement and issues during anesthesia, Set expectations for post-procedure period and Allowed opportunity for questions and acknowledgement of understanding      Vitals:  Vitals Value Taken Time   BP 94/82 05/17/22 1206   Temp     Pulse 90 05/17/22 1215   Resp 30 05/17/22 1215   SpO2 95 % 05/17/22 1215   Vitals shown include unvalidated device data.    Electronically Signed By: REBEKAH Isaac CRNA  May 17, 2022  12:16 PM

## 2022-05-17 NOTE — ANESTHESIA PREPROCEDURE EVALUATION
Pre-Operative H & P     CC:  Preoperative exam to assess for increased cardiopulmonary risk while undergoing surgery and anesthesia.    Date of Encounter: 5/2/2022  Primary Care Physician:  Elzbieta Pulido     Reason for visit: pre-operative physical; excessive mentrual bleeding    HPI  Zaida Lao is a 47 year old female who presents for pre-operative H & P in preparation for  Procedure Information     Date/Time: 5/17/22     Procedure: Total Robotic abdominal hysterectomy    Anesthesia type: general    Pre-op diagnosis: menorrhagia; proliferative type endometrium     Location: [facility]    Providers: Dr. Shahida Coyne          This is a 47 year old female who reports heavy periods, with an episode of 3 menses in 50 days in January 2022. She had a negative endometrial biopsy in 2021. She was using a Rolando cup, but had recurrent UTIs so thinking this may be a source she switched to tampons. Since January she has experienced menses every 24 day with 2 heavy days with leakage from tampons. LMP 4/25/22  She has no abdominal pain. She does have a history of recurrent UTIs - last one 3/25/22 S/P course of Nitrofurantoin.    Co morbidities include obesity, HTN, depression  History is obtained from the patient.    Hx of abnormal bleeding or anti-platelet use: as above    Menstrual history: Patient's last menstrual period was 04/25/2022 (exact date).:     Past Medical History  Past Medical History:   Diagnosis Date     Cervical high risk HPV (human papillomavirus) test positive 11/13/2020     Depressive disorder 2000    I take Lexapro     HTN (hypertension)      Human papillomavirus in conditions classified elsewhere and of unspecified site     genital warts 1996, resolved     Mild or unspecified pre-eclampsia, antepartum      LOC (obstructive sleep apnea)- pt feels it resolved since tonsillectomy and septoplasty - no testing after the surgery       Pre-eclampsia      Seasonal allergies     Zyrtec helps and has an Rx for Flonase       Past Surgical History  Past Surgical History:   Procedure Laterality Date      SECTION  2011    Procedure: SECTION; Surgeon:CHRISSY BRITTON; Location:[facility] L[facility]      SECTION  2014    Procedure:  SECTION;  Surgeon: Chrissy Britton MD;  Location: [facility] L[facility]     GYN SURGERY      tubal ligation     HC TOOTH EXTRACTION W/FORCEP      one dry socket     RW DENTAL (ABSTRACTED)       SEPTOPLASTY, TURBINOPLASTY, COMBINED Bilateral 3/9/2020    Procedure: SEPTOPLASTY, BILATERAL INFERIOR TURBINATE REDUCTION;  Surgeon: Jb Nava MD;  Location: [facility] OR     SURGICAL HISTORY OF -       lithotripsy     TONSILLECTOMY, ADENOIDECTOMY, COMBINED Bilateral 3/9/2020    Procedure: BILATERAL TONSILLECTOMY AND ADENOIDECTOMY;  Surgeon: Jb Nava MD;  Location: [facility] OR       Prior to Admission Medications  No current outpatient medications on file.       Allergies  Allergies   Allergen Reactions     Seasonal Allergies      Cold symptoms, stuffy nose, itchy throat and eyes       Social History  Social History     Socioeconomic History     Marital status:    Occupational History     Occupation: student     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   Tobacco Use     Smoking status: Former Smoker     Packs/day: 0.00     Years: 10.00     Pack years: 0.00     Types: Cigarettes     Start date: 1994     Quit date: 2005     Years since quittin.2     Smokeless tobacco: Never Used     Tobacco comment: Social smoker   Vaping Use     Vaping Use: Never used   Substance and Sexual Activity     Alcohol use: Yes     Comment: Socially     Drug use: Yes     Types: Marijuana     Comment: Gummies 2-3 per month     Sexual activity: Yes     Partners: Male     Birth control/protection: Female Surgical     Comment: Tubal ligation post c section    Other Topics Concern     Parent/sibling w/ CABG, MI  or angioplasty before 65F 55M? No     Family History  Family History   Problem Relation Age of Onset     C.A.D. Paternal Grandfather          of MI     Breast Cancer Maternal Grandmother              Cancer - colorectal Maternal Grandmother      Circulatory Maternal Grandmother         aneurism     Prostate Cancer Maternal Grandfather              Eye Surgery Maternal Grandfather         cataracts, late in life     Eye Disorder Father         detached retina     Genitourinary Problems Father         kidney stones     Macular Degeneration Father      Hypertension Father      Nephrolithiasis Father      Cerebrovascular Disease Paternal Grandmother              Glaucoma No family hx of        Review of Systems  The complete review of systems is negative other than noted in the HPI or here.   Anesthesia Evaluation   Pt has had prior anesthetic. Type: General.    History of anesthetic complications  - PONV.      ROS/MED HX  ENT/Pulmonary: Comment: Stopped CPAP after 3 months - she could not tolerate  Referred to ENT  And recommendation was T & A and septoplasty.  Rare snoring now  Quit smoking     (+) sleep apnea, doesn't use CPAP, LOC risk factors, hypertension, obese, tobacco use, Past use, 10  Pack-Year Hx,      Neurologic:       Cardiovascular:     (+) hypertension-----Previous cardiac testing     METS/Exercise Tolerance:  Comment: HTN diagnosed in .  On 5 mg Lisinopril - normotensive   Hematologic:  - neg hematologic  ROS     Musculoskeletal:  - neg musculoskeletal ROS     GI/Hepatic:  - neg GI/hepatic ROS     Renal/Genitourinary:     (+) Nephrolithiasis ,     Endo:     (+) Obesity,     Psychiatric/Substance Use:     (+) psychiatric history depression     Infectious Disease:  - neg infectious disease ROS     Malignancy:  - neg malignancy ROS     Other:            LMP 2022 (Exact Date)     Physical Exam   Constitutional: Awake, alert, cooperative, no apparent distress, and  appears stated age.  Eyes: Pupils equal, round and reactive to light, extra ocular muscles intact, sclera clear, conjunctiva normal.  HENT: Normocephalic, oral pharynx with moist mucus membranes, good dentition. No goiter appreciated.   Respiratory: Clear to auscultation bilaterally, no crackles or wheezing.  Cardiovascular: Regular rate and rhythm, normal S1 and S2, and no murmur noted.  Carotids +2, no bruits. No edema. Palpable pulses to radial  DP and PT arteries.   GI: Normal bowel sounds, soft, non-distended, non-tender, no masses palpated, no hepatosplenomegaly.  Lymph/Hematologic: No cervical lymphadenopathy and no supraclavicular lymphadenopathy.  Genitourinary:  deferred  Skin: Warm and dry.  No rashes at anticipated surgical site.   Musculoskeletal: Full ROM of neck. There is no redness, warmth, or swelling of the joints. Gross motor strength is normal.    Neurologic: Awake, alert, oriented to name, place and time. Cranial nerves II-XII are grossly intact. Gait is normal.   Neuropsychiatric: Calm, cooperative. Normal affect.     Prior Labs/Diagnostic Studies     No results found.    The patient's records and results personally reviewed by this provider.     Outside records reviewed from: [facility]- looked at EKG from 2009  EKG 2009: normal      LAB/DIAGNOSTIC STUDIES TODAY:  Type and screen on hold by surgeon - will order today, along with CBC, BMP    Assessment      Zaida Lao is a 47 year old female seen as a PAC referral for risk assessment and optimization for anesthesia.    Plan/Recommendations  Pt will be optimized for the proposed procedure.  See below for details on the assessment, risk, and preoperative recommendations    NEUROLOGY  - No history of TIA, CVA or seizure  -Post Op delirium risk factors:  No risk identified    ENT  - No current airway concerns.  Will need to be reassessed day of surgery.  Mallampati: IV  TM: > 3    CARDIAC  - Hypertension.   Well controlled on  "Lisinopril. No CP, MUNIZ, palpitations.  - METS (Metabolic Equivalents)  Patient performs 4 or more METS exercise without symptoms      RCRI-Very low risk: Class 1:  0.4% complication rate for major adverse cardiac events           PULMONARY  LOC diagnosed prior to 2020 and patient put on CPAP. She could not tolerate after 3 months and saw an ENT provider. SHe had tonsillectomy and septoplasty and was not tested after this procedure.  Snoring  LOC Low Risk            Total Score: 2    LOC: Hypertension    LOC: BMI over 35 kg/m2      - Denies asthma or inhaler use  - Tobacco History      History   Smoking Status     Former Smoker     Packs/day: 0.00     Years: 10.00     Types: Cigarettes     Start date: 6/1/1994     Quit date: 1/30/2005   Smokeless Tobacco     Never Used     Comment: Social smoker       GI  - no sx of reflux  PONV Medium Risk  Total Score: 2           1 AN PONV: Pt is Female    1 AN PONV: Patient is not a current smoker        /RENAL  H/o renal stones and lithotripsy  Recurrent UTIs - last one 3/25/22  Urinalysis   - Baseline Creatinine  Sent to    ENDOCRINE    - BMI: Estimated body mass index is 42.33 kg/m  as calculated from the following:    Height as of an earlier encounter on 5/17/22: 1.676 m (5' 5.98\").    Weight as of an earlier encounter on 5/17/22: 118.9 kg (262 lb 2 oz).    - No history of Diabetes Mellitus    HEME  VTE Low Risk 0.26%            Total Score: 1    VTE: BMI greater than 39        MSK  Patient is NOT Frail             Patient was discussed with Dr Benitez    The patient is optimized for their procedure. AVS with information on surgery time/arrival time, meds and NPO status given by nursing staff. No further diagnostic testing indicated.      On the day of service:     Prep time: 20 minutes  Visit time: 20 minutes  Documentation time: 20 minutes  ------------------------------------------  Total time: 60 minutes      WALTER Soto  Preoperative Assessment " [facility]  Phone: [phone]  Fax: [phone]    Physical Exam    Airway        Mallampati: II   TM distance: > 3 FB   Neck ROM: full   Mouth opening: > 3 cm    Respiratory Devices and Support         Dental           Cardiovascular             Pulmonary                     Anesthesia Plan    ASA Status:  3      Anesthesia Type: General.     - Airway: ETT   Induction: Intravenous, Propofol.   Maintenance: TIVA.        Consents    Anesthesia Plan(s) and associated risks, benefits, and realistic alternatives discussed. Questions answered and patient/representative(s) expressed understanding.    - Discussed:     - Discussed with:  Patient      - Extended Intubation/Ventilatory Support Discussed: No.      - Patient is DNR/DNI Status: No    Use of blood products discussed: No .     Postoperative Care    Pain management: IV analgesics, Multi-modal analgesia, Oral pain medications.   PONV prophylaxis: Ondansetron (or other 5HT-3), Background Propofol Infusion     Comments:

## 2022-05-17 NOTE — ADDENDUM NOTE
Addendum  created 05/17/22 1356 by Hitesh Chacon MD    Attestation recorded in Intraprocedure, Child order released for a procedure order, Clinical Note Signed, Flowsheet accepted, Intraprocedure Attestations filed, Intraprocedure Blocks edited, Result filed, SmartForm saved

## 2022-05-17 NOTE — BRIEF OP NOTE
[facility]    Brief Operative Note    Pre-operative diagnosis: Abnormal uterine bleeding, stress urinary incontinence  Post-operative diagnosis Same as pre-operative diagnosis    Procedure: Procedure(s):  CREATION, VAGINAL SLING, WITH CYSTOSCOPY  HYSTERECTOMY, TOTAL, ROBOT-ASSISTED, WITH BILATERAL SALPINGECTOMY, CYSTOSCOPY  Surgeon: Surgeon(s) and Role:  Panel 1:     * Hany Thurston MD - Primary     * Maria De Jesus Cabrera MD  Panel 2:     * Shahida Coyne MD - Primary      * Maria De Jesus Cabrera MD - Resident - Assisting  Anesthesia: General   Estimated Blood Loss: 50 mL from 5/17/2022  8:16 AM to 5/17/2022 12:04 PM    Drains: Schumacher catheter  Specimens:   ID Type Source Tests Collected by Time Destination   1 :  Tissue Uterus, Cervix, Bilateral Fallopian Tubes SURGICAL PATHOLOGY EXAM Hany Thurston MD 5/17/2022 11:24 AM      Findings:   Laparoscopic view with no evidence of injury on abdominal entry, small omental adhesion to anterior abdominal wall, normal appearing liver, omentum, bowel. Normal appearing uterus, bilateral ovaries with left dominant follicle, bilateral salpinges with evidence of previous surgical interruption. Cystoscopy at end of case with bilateral ureteral jets visualized, no evidence of bladder injury.    Complications: None.  Implants:   Implant Name Type Inv. Item Serial No.  Lot No. LRB No. Used Action   MESH SLING ADVANTAGE FIT SYSTEM [id number] - [id number] Mesh MESH SLING ADVANTAGE FIT SYSTEM [id number]  [employer] [id number] N/A 1 Implanted     Maria De Jesus Cabrera MD  Ob/Gyn Resident, PGY-4  05/17/22 12:19 PM

## 2022-05-17 NOTE — OR NURSING
PACU to Inpatient Nursing Handoff    Patient Zaida Lao is a 47 year old female who speaks English.   Procedure Procedure(s):  CREATION, VAGINAL SLING, WITH CYSTOSCOPY  HYSTERECTOMY, TOTAL, ROBOT-ASSISTED, WITH BILATERAL SALPINGECTOMY, CYSTOSCOPY   Surgeon(s) Primary: Hany Thurston MD     Allergies   Allergen Reactions     Seasonal Allergies      Cold symptoms, stuffy nose, itchy throat and eyes       Isolation  none    Past Medical History   has a past medical history of Cervical high risk HPV (human papillomavirus) test positive (11/13/2020), Depressive disorder (2000), HTN (hypertension), Human papillomavirus in conditions classified elsewhere and of unspecified site, Mild or unspecified pre-eclampsia, antepartum, LOC (obstructive sleep apnea), Pre-eclampsia, and Seasonal allergies.    Anesthesia General   Dermatome Level     Preop Meds acetaminophen (Tylenol) - time given: 0724  scopolamine patch   Nerve block Transversus abdominus plane (TAP).  Location:bilateral. Med:bupivacaine. Time given: 1320   Intraop Meds dexamethasone (Decadron)  fentanyl (Sublimaze): 225 mcg total  hydromorphone (Dilaudid): 0.8 mg total  ketorolac (Toradol): last given at 1140  ondansetron (Zofran): last given at 1124   Local Meds No   Antibiotics cefazolin (Ancef) - last given at 0816     Pain Patient Currently in Pain: yes   PACU meds  fentanyl (Sublimaze): 100 mcg (total dose) last given at 1324   oxycodone (Roxicodone): 5 mg (total dose) last given at 1357    PCA / epidural No   Capnography     Telemetry ECG Rhythm: Normal sinus rhythm   Inpatient Telemetry Monitor Ordered? No        Labs Glucose Lab Results   Component Value Date     05/17/2022    GLC 95 05/02/2022    GLC 98 11/13/2020       Hgb Lab Results   Component Value Date    HGB 13.9 05/17/2022    HGB 9.3 07/15/2014       INR No results found for: INR   PACU Imaging Not applicable     Wound/Incision Incision/Surgical Site 05/17/22 Lower Umbilicus (Active)    Incision Assessment [facility] 05/17/22 1600   Closure Liquid bandage 05/17/22 1600   Incision Drainage Amount None 05/17/22 1600   Dressing Intervention Clean, dry, intact 05/17/22 1600   Number of days: 0       Incision/Surgical Site 05/17/22 Left Abdomen (Active)   Incision Assessment [facility] 05/17/22 1600   Closure Liquid bandage 05/17/22 1600   Incision Drainage Amount None 05/17/22 1600   Dressing Intervention Clean, dry, intact 05/17/22 1600   Number of days: 0       Incision/Surgical Site 05/17/22 Left;Lower Abdomen (Active)   Incision Assessment [facility] 05/17/22 1600   Closure Adhesive strip(s) 05/17/22 1600   Incision Drainage Amount None 05/17/22 1600   Dressing Intervention Clean, dry, intact 05/17/22 1600   Number of days: 0       Incision/Surgical Site 05/17/22 Right Abdomen (Active)   Incision Assessment [facility] 05/17/22 1600   Closure Adhesive strip(s) 05/17/22 1600   Incision Drainage Amount None 05/17/22 1600   Dressing Intervention Clean, dry, intact 05/17/22 1600   Number of days: 0       Incision/Surgical Site 05/17/22 Bilateral;Lower Abdomen (Active)   Incision Assessment [facility] 05/17/22 1600   Closure Liquid bandage 05/17/22 1600   Incision Drainage Amount None 05/17/22 1600   Dressing Intervention Clean, dry, intact 05/17/22 1600   Number of days: 0      CMS        Equipment abdominal binder   Other LDA       IV Access Peripheral IV 05/17/22 Left Hand (Active)   Site Assessment [facility] 05/17/22 1700   Line Status Saline locked 05/17/22 1700   Phlebitis Scale 0-->no symptoms 05/17/22 1206   Number of days: 0      Blood Products Not applicable EBL  50 mL   Intake/Output Date 05/17/22 0700 - 05/18/22 0659   Shift 6016-1105 5165-9806 1002-4057 24 Hour Total   INTAKE   P.O. 100 120  220   I.V. 1650   1650   Shift Total(mL/kg) 1750(14.72) 120(1.01)  1870(15.73)   OUTPUT   Urine 710 300  1010   Blood 50   50   Shift Total(mL/kg) 760(6.39) 300(2.52)  1060(8.92)   Weight (kg) 118.9 118.9 118.9 118.9      Drains / Schumacher      Time of void PreOp Void Prior to Procedure: 0615 (05/17/22 0659)    PostOp Voided (mL): 300 mL (05/17/22 1700)    Diapered? No   Bladder Scan Bladder Scan Volume (mL): 17 ml (05/17/22 1700)    mL (ginger ale) (05/17/22 1600)  tolerating sips     Vitals    B/P: 112/69  T: 97.3  F (36.3  C)    Temp src: Oral  P:  Pulse: 68 (05/17/22 1630)          R: 12  O2:  SpO2: 95 %    O2 Device: None (Room air) (05/17/22 0629)    Oxygen Delivery: 1 LPM (05/17/22 1550)         Family/support present significant other   Patient belongings     Patient transported on wheelchair   DC meds/scripts (obs/outpt) Yes, meds   Inpatient Pain Meds Released? Yes       Special needs/considerations None   Tasks needing completion None       Rosalie Vann RN  ASCOM [id number]

## 2022-05-17 NOTE — OP NOTE
Gynecology Operative Note    Zaida Lao  [id number]     Date of Surgery: 22      Surgeon: Shahida Coyne MD     Assistants: Maria De Jesus Cabrera MD PGY-4; Neetu Zapata MS3     Pre-operative Diagnoses: Abnormal uterine bleeding, stress incontinence     Post-operative Diagnoses: Same as above, s/p below stated procedure     Procedure: Robot-assisted total laparoscopic hysterectomy, bilateral salpingectomy, cystoscopy; transvaginal sling placement and cystoscopy     Anesthesia: GETA     EBL: 50cc  IVF: 1450cc  UO: 650cc     Complications: None  Findings: Laparoscopic view with no evidence of injury on abdominal entry, small omental adhesion to anterior abdominal wall, normal appearing liver, omentum, bowel. Normal appearing uterus, bilateral ovaries with left dominant follicle, bilateral salpinges with evidence of previous surgical interruption. Moderate to dense adhesions between bladder and lower uterine segment. Cystoscopy at end of case with bilateral ureteral jets visualized, no evidence of bladder injury.  Specimens: Uterus, cervix, residual portions of bilateral salpinges     Indications: Zaida Lao is a 47 year old  who presented to clinic with abnormal uterine bleeding, desiring definitive management. Endometrial biopsy was benign. She also complained of stress incontinence and was seen by Urogynecology for discussion of transvaginal sling placement. The above stated procedure was recommended. She was counseled on the risks, benefits, and alternatives and desired to proceed. Written informed consent was signed.     Procedure Details: After obtaining informed consent, the patient was brought to the operating room where adequate general anesthesia was administered.  She was placed in the dorsal lithotomy position, and exam under anesthesia revealed the findings noted above. She was prepped and draped in the usual sterile fashion, and a Schumacher catheter was placed. A surgical timeout was  performed. Please see separate operative note for Dr. Thurston's portion of the procedure. A sterile speculum was placed and the cervix was dilated to 6mm with Hegar dilators and an Advincula arch manipulator was placed with a 3.5cm KOH cup. The speculum was removed. An 8mm incision was made in the superior aspect of the umbilicus. The Veress needle was placed, and intraperitoneal placement was suggested by the saline drop test and an opening pressure of less than 5mmHg.  The abdomen was then insufflated to a maximum pressure of 15mmHg. The Veress needle was removed and an 8mm robotic trocar was placed.  The laparoscope was placed, and a survey of the abdomen revealed the findings noted above. No trauma from entry was noted. Attention was turned to the right lower quadrant. At a point roughly 8cm lateral to the umbilicus, an 8mm incision was made, and an 8mm trocar was placed under direct visualization. Attention was then turned to the left lower quadrant. Similarly, at a point roughly 16cm lateral to the umbilicus, an 8mm incision was made, and an 8mm trocar was placed under direct visualization. An additional 12mm assistant port was placed under direct visualization in the left upper quadrant. No local anesthetic was used due to plan for postoperative TAP block placement. The patient was placed in steep Trendelenburg position and the robot was docked. The monopolar scissors were placed in the right arm and the fenestrated bipolar forceps were placed in the left arm.     At this point, the right ureter was identified transperitoneally, and noted to vermiculate. A small filmy adhesion of the sigmoid colon to the left pelvic sidewall was sharply divided but the left ureter was unable to be visualized due to retroperitoneal adiposity. The right mesosalpinx was divided from distal to proximal end with bipolar cautery and the monopolar scissors until the residual portion of the right salpinx could be excised. The  specimen was removed and sent for pathology, after which this process was repeated on the left.    The left uteroovarian vessels were then cauterized and divided. The left round ligament was grasped, cauterized, and divided. The posterior leaf of the broad ligament was then dissected inferomedially with cautery to the level of the uterine artery and then around the posterior aspect of the KOH cup. The anterior leaf of the broad ligament was then divided inferomedially with cautery and the dissection was then carried medially across the vesicouterine peritoneum to partially create the bladder flap. Gentle blunt dissection was used to develop the plane between the bladder and uterus. The uterine vessels were then skeletonized, cauterized, and divided. The right uteroovarian vessels were then cauterized and divided, followed by the right round ligament.  The  posterior leaf of the broad ligament was then likewise dissected to meet the left sided-incision. The anterior leaf was also likewise divided to meet the prior dissection site. Moderate adhesions were still noted in midline between the bladder and uterus and the bladder was backfilled with 150mL saline. The remaining adherent peritoneum was then scored with cautery and bluntly dissected away from the uterus and cervix to further develop the bladder flap, with monopolar cautery used to control small areas of bleeding on the cervix. The right uterine artery was skeletonized, cauterized and cut. The right cardinal ligament was cauterized and transected using cautery with care to avoid the ureter laterally. Additional blunt dissection of the bladder flap was performed on the left, followed by cautery and division of the cardinal ligament in a similar fashion as on the right. Monopolar cautery was used to create the colpotomy until the uterus was completely amputated at the level of the vaginal cuff along the KOH ring, and the specimen was removed through the vagina. A  balloon filled with air was inserted into the vagina to maintain pneumoperitoneum. The vaginal cuff was then irrigated and closed using 2-0 VLoc suture in a running fashion.  Good hemostasis was noted at all excision sites. The kirkpatrick catheter was then removed and the cystoscope was placed in the bladder. Efflux was noted from bilateral ureteral orifices. A complete survey of the bladder revealed no injuries or sutures. The robot was undocked, the abdomen was desufflated, and all ports were removed. All skin incisions were closed using 4-0 Monocryl, and covered with skin glue. The Kirkpatrick was replaced in the bladder for an active trial of void in the recovery area.     All sponge, needle, and instrument counts were correct. The patient tolerated the procedure well and was transferred to recovery in stable condition. Dr. Coyne was present for the entire procedure.    Maria De Jesus Cabrera MD  Ob/Gyn Resident, PGY-4  05/17/22 12:45 PM     Staff:  I was scrubbed and present for entire case and agree w/ above note.  Please see Dr. Thurston's op note for details of his portion of the case.     Shahida Coyne MD

## 2022-05-17 NOTE — OR NURSING
Dr Benitez paged to bedside to assess patient's desating while sleeping. Plan made to observe patient for an hour in Phase II while sleeping. Will continue to monitor.    Dr Cabrera paged regarding urine output post discontinuing Schumacher catheter and PVR bladder scan. Based on these results patient is cleared for discharge home.

## 2022-05-17 NOTE — ANESTHESIA PROCEDURE NOTES
Airway         Procedure Start/Stop Times: 5/17/2022 8:26 AM  Staff -        CRNA: Fabi Aguero APRN CRNA       Performed By: CRNA  Consent for Airway        Urgency: elective  Indications and Patient Condition       Indications for airway management: carri-procedural       Induction type:intravenous       Mask difficulty assessment: 1 - vent by mask    Final Airway Details       Final airway type: endotracheal airway       Successful airway: ETT - single  Endotracheal Airway Details        ETT size (mm): 7.0       Cuffed: yes       Successful intubation technique: direct laryngoscopy       DL Blade Type: Griffith 2       Grade View of Cords: 1       Adjucts: stylet       Position: Right       Measured from: lips       Secured at (cm): 22       Bite block used: None    Post intubation assessment        Placement verified by: capnometry and equal breath sounds        Number of attempts at approach: 1       Secured with: silk tape       Ease of procedure: easy       Dentition: Intact and Unchanged    Medication(s) Administered   Medication Administration Time: 5/17/2022 8:26 AM

## 2022-05-17 NOTE — ANESTHESIA POSTPROCEDURE EVALUATION
"Patient: Zaida Lao    Procedure: Procedure(s):  CREATION, VAGINAL SLING, WITH CYSTOSCOPY  HYSTERECTOMY, TOTAL, ROBOT-ASSISTED, WITH BILATERAL SALPINGECTOMY, CYSTOSCOPY       Anesthesia Type:  General    Note:  Disposition: Outpatient   Postop Pain Control: Uneventful            Sign Out: Well controlled pain   PONV: No   Neuro/Psych: Uneventful            Sign Out: Acceptable/Baseline neuro status   Airway/Respiratory: Uneventful            Sign Out: Acceptable/Baseline resp. status   CV/Hemodynamics: Uneventful            Sign Out: Acceptable CV status   Other NRE: NONE   DID A NON-ROUTINE EVENT OCCUR? No           Last vitals:  Vitals Value Taken Time   /98 05/17/22 1335   Temp 36.4  C (97.5  F) 05/17/22 1206   Pulse 93 05/17/22 1340   Resp 11 05/17/22 1340   SpO2 93 % 05/17/22 1340   Vitals shown include unvalidated device data.    Patient Vitals for the past 24 hrs:   BP Temp Temp src Pulse Resp SpO2 Height Weight   05/17/22 1315 123/72 -- -- 90 19 95 % -- --   05/17/22 1300 107/82 -- -- 81 15 94 % -- --   05/17/22 1245 124/68 -- -- 90 19 94 % -- --   05/17/22 1230 117/71 -- -- 86 13 94 % -- --   05/17/22 1215 111/57 -- -- 90 30 95 % -- --   05/17/22 1206 94/82 36.4  C (97.5  F) Axillary 92 14 92 % -- --   05/17/22 0629 137/89 36.7  C (98.1  F) Oral 70 16 98 % 1.676 m (5' 5.98\") 118.9 kg (262 lb 2 oz)         Electronically Signed By: Nano Hawkins MD  May 17, 2022  1:41 PM  "

## 2022-05-17 NOTE — OP NOTE
DATE OF PROCEDURE: May 17, 2022    PREOPERATIVE DIAGNOSIS:   1. Stress urinary incontinence    POSTOPERATIVE DIAGNOSIS:   1. Stress urinary incontinence    PROCEDURE:    1. Retropubic midurethral sling   2. Cystoscopy    SURGEON: Karena    RESIDENT(S): -    ANESTHESIA: General endotracheal     EBL: 10cc    FLUIDS: not recorded    URINE OUTPUT: not recorded    CONDITION: stable    COMPLICATIONS: none apparent    SPECIMEN: none    IMPLANTS: [employer] Advantage Fit Sling    MEDICATIONS: Ancef 2g IV was given prior to the start of the procedure    DRAINS, PACKS: Schumacher catheter    INDICATIONS: The patient is a 47 year old female with KING who requests surgical intervention.  Prior to the procedure, she was counseled as to the treatment options and their risks/benefits.  She was desirous of surgical intervention consisting of the procedures mentioned above.  She was counseled on the risks/benefits/alternatives/indications of the procedure and written, informed consent was obtained prior to proceeding to the OR.     FINDINGS: Normal appearing bladder without evidence of injury and bilateral ureteral function.    OPERATIVE PROCEDURE:    The patient was taken to the operating room where general endotracheal anesthesia was found to be adequate.  She was positioned in dorsal lithotomy position using a pink pad positioner and sammie stirrups.  Her arms were tucked by her sides carefully using arm foam pads. She was then prepped and draped in the normal sterile fashion.  A Schumacher catheter was placed in the bladder.    Attention was turned to the vagina. The TVT trocar sites were marked 2.5cm from the midline bilaterally just over the pubic bone.  Stab incisions were then made at these sites with the scalpel. The urethrovesical junction was then identified and an incision was made vertically on the anterior vaginal wall at the level of the mid-urethra following injection of 1% lidocaine with epinephrine.  Tracts were  created laterally from the vaginal out to the pubic rami bilaterally using Metzenbaum scissors.      The catheter guide was used to deviate the bladder and urethra away from the operative site. The left-sided TVT trocar was then placed without difficulty in the usual fashion. The same procedure was performed on the right side. With both trocars still in place, the Schumacher was removed and cystourethroscopy was performed.  A complete survey of the bladder and urethra revealed no defects or injury to the bladder or urethra.  The cystoscope was removed.  The Schumacher was replaced and the bladder again drained.    The trocars were then removed and the sling was tensioned loosely.  The plastic sheaths were removed in their entirety.  Good hemostasis was noted.  The vaginal incision was then closed with 2-0 Vicryl in running fashion.  The skin incisions were closed with Exophin.    Dr. Coyne then took over to complete the robotic hysterectomy. Please refer to her dictation for the details of that portion of the surgery.    Hany Thurston MD  Professor, OB/GYN  Urogynecologist

## 2022-05-17 NOTE — DISCHARGE SUMMARY
[facility]  Gynecology Discharge Summary    Admission Date:  22  Discharge Date:  22    Admission Attending: Doreen Singh MD  Discharge Attending: Jazzmine Cooper    Admission Diagnosis:  - Abnormal uterine bleeding, s/p below stated procedure  - Stress incontinence  - Postoperative hypoventilation    Discharge Diagnosis:  - Same as above, hypoventilation improved    Procedures Performed:  - Robot-assisted total laparoscopic hysterectomy, bilateral salpingectomy; transvaginal midurethral sling placement, cystoscopy  - TAP block    Admission History:  Zaida Lao is a 47 year old  who presented to clinic with abnormal uterine bleeding, desiring definitive management. Endometrial biopsy was benign. She also complained of stress incontinence and was seen by Urogynecology for discussion of transvaginal sling placement. The above stated procedure was recommended. She was counseled on the risks, benefits, and alternatives and desired to proceed. Written informed consent was signed.    Operative Course:  She underwent the above stated procedure, which was uncomplicated. EBL was 50 cc.    Operative Findings:   Laparoscopic view with no evidence of injury on abdominal entry, small omental adhesion to anterior abdominal wall, normal appearing liver, omentum, bowel. Normal appearing uterus, bilateral ovaries with left dominant follicle, bilateral salpinges with evidence of previous surgical interruption. Moderate to dense adhesions between bladder and lower uterine segment. Cystoscopy at end of case with bilateral ureteral jets visualized, no evidence of bladder injury.    Hospital Course:  Her postoperative course was complicated by hypoventilation in the PACU. She was admitted for postoperative observation. Her oxygen was weaned to room air by POD#1. She passed an active trial of void on PACU. On POD#0, diet was advanced and she was transitioned to PO pain  medications. On POD#1 she was tolerating regular diet, ambulating without difficulty, voiding spontaneously, and controlling pain with PO medications, maintaining her oxygen saturation, and she was deemed stable for discharge. Hemoglobin at the time of discharge was 11.7.    Discharge Plans:  - The patient was discharged to home  - PO Activity:   - No lifting >15 lbs or strenuous exercise for 6 weeks   - No driving while taking narcotics or until you can slam on the brakes without pain  - She was instructed to call [facility] or return to ED if she has any of the following:    - Temperature greater than 100.4F   - Pain not controlled by pain medications   - Uncontrolled nausea/vomiting   - Foul-smelling vaginal discharge   - Vaginal bleeding soaking 1 pad per hour for 2 hours in a row  - She will follow up with Dr. Coyne and Dr. Thurston in 2-4 weeks    - Discharge medications include:     Medication List      Started    acetaminophen 325 MG tablet  Commonly known as: TYLENOL  650 mg, Oral, EVERY 6 HOURS PRN     ibuprofen 600 MG tablet  Commonly known as: ADVIL/MOTRIN  600 mg, Oral, EVERY 6 HOURS PRN     oxyCODONE 5 MG tablet  Commonly known as: ROXICODONE  5 mg, Oral, EVERY 6 HOURS PRN     SENNA-docusate sodium 8.6-50 MG tablet  Commonly known as: SENNA S  1-2 tablets, Oral, 2 TIMES DAILY PRN          Maria De Jesus Cabrera MD  Ob/Gyn Resident, PGY-4  05/18/22 7:32 AM

## 2022-05-17 NOTE — ANESTHESIA POSTPROCEDURE EVALUATION
Patient: Zaida Lao    Procedure: Procedure(s):  CREATION, VAGINAL SLING, WITH CYSTOSCOPY  HYSTERECTOMY, TOTAL, ROBOT-ASSISTED, WITH BILATERAL SALPINGECTOMY, CYSTOSCOPY       Anesthesia Type:  General    Note:  Disposition: Admission; Disposition Change/Cancellation            Disposition Change: Unplanned admission/ICU admission   Postop Pain Control: Uneventful            Sign Out: Well controlled pain   PONV: No   Neuro/Psych: Uneventful            Sign Out: Acceptable/Baseline neuro status   Airway/Respiratory:             Events: REFRACTORY hypoxia            Sign Out: ABNORMAL respiratory status               Respiratory Exam: Normal RR; Unlabored; CTAB   CV/Hemodynamics: Uneventful            Sign Out: Acceptable CV status; No obvious hypovolemia; No obvious fluid overload   Other NRE: NONE   DID A NON-ROUTINE EVENT OCCUR? YES    Event details/Postop Comments:  I was called to assess Zaida in Phase 2 due to recurrent desaturations when she falls asleep, down to mid/high 80s. She is easily arousable and with deep breaths quickly comes back up to low/mid 90s in her O2 saturation, on room air. She has been managed with incentive spirometry without great improvement. When she is awake and conversing, she has not had any desaturations. Her lungs are clear bilaterally to auscultation, her breathing is not labored and she denies shortness of breath.  She does have a history of sleep apnea, per sleep study in 2019 with recommendation for CPAP. However, she reports being told subsequently that she needed a septoplasty, not CPAP. Since the septoplasty, she has not had another sleep study. This is most consistent with untreated LOC, likely exacerbated by sedation following general anesthesia. Given her likely need for postoperative opioids which could blunt her respiratory drive, and the possibility of untreated LOC, we have asked her primary team to admit her overnight for observation. Her  also expressed  that he would not be comfortable with Zaida coming home tonight given her frequent need to be prompted to take deep breaths. Zaida and her  are happy with the plan. They had no further questions or concerns for me at the end of our conversation.            Last vitals:  Vitals Value Taken Time   /74 05/17/22 1435   Temp 36.8  C (98.2  F) 05/17/22 1415   Pulse 69 05/17/22 1438   Resp 12 05/17/22 1438   SpO2 95 % 05/17/22 1438   Vitals shown include unvalidated device data.    Electronically Signed By: Rosalie Benitez MD  May 17, 2022  6:28 PM

## 2022-05-17 NOTE — DISCHARGE INSTRUCTIONS
Same-Day Surgery   Adult Discharge Orders & Instructions     For 24 hours after surgery:  Get plenty of rest.  A responsible adult must stay with you for at least 24 hours after you leave the hospital.   Pain medication can slow your reflexes. Do not drive or use heavy equipment.  If you have weakness or tingling, don't drive or use heavy equipment until this feeling goes away.  Mixing alcohol and pain medication can cause dizziness and slow your breathing. It can even be fatal. Do not drink alcohol while taking pain medication.  Avoid strenuous or risky activities.  Ask for help when climbing stairs.   You may feel lightheaded.  If so, sit for a few minutes before standing.  Have someone help you get up.   If you have nausea (feel sick to your stomach), drink only clear liquids such as apple juice, ginger ale, broth or 7-Up.  Rest may also help.  Be sure to drink enough fluids.  Move to a regular diet as you feel able. Take pain medications with a small amount of solid food, such as toast or crackers, to avoid nausea.   A slight fever is normal. Call the doctor if your fever is over 100 F (37.7 C) (taken under the tongue) or lasts longer than 24 hours.  You may have a dry mouth, muscle aches, trouble sleeping or a sore throat.  These symptoms should go away after 24 hours.  Do not make important or legal decisions.   Pain Management:      1. Take pain medication (if prescribed) for pain as directed by your physician.        2. WARNING: If the pain medication you have been prescribed contains Tylenol  (acetaminophen), DO NOT take additional doses of Tylenol (acetaminophen).     Call your doctor for any of the followin.  Signs of infection (fever, growing tenderness at the surgery site, severe pain, a large amount of drainage or bleeding, foul-smelling drainage, redness, swelling).    2.  It has been over 8 to 10 hours since surgery and you are still not able to urinate (pee).    3.  Headache for over 24  hours.    4.  Numbness, tingling or weakness the day after surgery (if you had spinal anesthesia).  To contact a doctor, call ____Dr. Coyne, OB/GYN, [phone]   Dr. Hany Thurston, OB/GYN clinic at [phone]  _________________________________ or:  '   [phone] and ask for the Resident On Call for:          ___Obgyn or Urology_______________________________________ (answered 24 hours a day)  '   Emergency Department:  [facility]: [phone]  [facility]: [phone]        Tylenol given at 7:24 am may take again after 1:24 pm  Ibuprofen (Toradol) 11:40 am may take again after may take again after 6:40 pm  Oxycodone given at 1:57 pm, may take again after 8pm              Rev. 10/2014

## 2022-05-18 LAB
GLUCOSE BLDC GLUCOMTR-MCNC: 99 MG/DL (ref 70–99)
HGB BLD-MCNC: 11.7 G/DL (ref 11.7–15.7)
HOLD SPECIMEN: NORMAL

## 2022-05-18 PROCEDURE — 250N000013 HC RX MED GY IP 250 OP 250 PS 637: Performed by: STUDENT IN AN ORGANIZED HEALTH CARE EDUCATION/TRAINING PROGRAM

## 2022-05-18 PROCEDURE — 36415 COLL VENOUS BLD VENIPUNCTURE: CPT | Performed by: STUDENT IN AN ORGANIZED HEALTH CARE EDUCATION/TRAINING PROGRAM

## 2022-05-18 PROCEDURE — 85018 HEMOGLOBIN: CPT | Performed by: STUDENT IN AN ORGANIZED HEALTH CARE EDUCATION/TRAINING PROGRAM

## 2022-05-18 PROCEDURE — 82962 GLUCOSE BLOOD TEST: CPT

## 2022-05-18 RX ORDER — ACETAMINOPHEN 500 MG
1000 TABLET ORAL DAILY PRN
Status: ON HOLD | COMMUNITY
End: 2022-05-18

## 2022-05-18 RX ORDER — IBUPROFEN 200 MG
600 TABLET ORAL DAILY PRN
Status: ON HOLD | COMMUNITY
End: 2022-05-18

## 2022-05-18 RX ADMIN — SERTRALINE HYDROCHLORIDE 200 MG: 100 TABLET ORAL at 07:36

## 2022-05-18 RX ADMIN — OXYCODONE HYDROCHLORIDE 5 MG: 5 TABLET ORAL at 03:36

## 2022-05-18 RX ADMIN — SENNOSIDES AND DOCUSATE SODIUM 1 TABLET: 50; 8.6 TABLET ORAL at 07:36

## 2022-05-18 RX ADMIN — OXYCODONE HYDROCHLORIDE 10 MG: 10 TABLET ORAL at 22:53

## 2022-05-18 RX ADMIN — IBUPROFEN 800 MG: 600 TABLET ORAL at 19:32

## 2022-05-18 RX ADMIN — IBUPROFEN 800 MG: 600 TABLET ORAL at 06:51

## 2022-05-18 RX ADMIN — SENNOSIDES AND DOCUSATE SODIUM 1 TABLET: 50; 8.6 TABLET ORAL at 19:32

## 2022-05-18 RX ADMIN — ACETAMINOPHEN 975 MG: 325 TABLET ORAL at 13:04

## 2022-05-18 RX ADMIN — ACETAMINOPHEN 975 MG: 325 TABLET ORAL at 19:32

## 2022-05-18 RX ADMIN — OXYCODONE HYDROCHLORIDE 10 MG: 10 TABLET ORAL at 16:37

## 2022-05-18 RX ADMIN — ACETAMINOPHEN 975 MG: 325 TABLET ORAL at 01:12

## 2022-05-18 RX ADMIN — OXYCODONE HYDROCHLORIDE 10 MG: 10 TABLET ORAL at 11:48

## 2022-05-18 RX ADMIN — IBUPROFEN 800 MG: 600 TABLET ORAL at 01:06

## 2022-05-18 RX ADMIN — ACETAMINOPHEN 975 MG: 325 TABLET ORAL at 06:51

## 2022-05-18 RX ADMIN — IBUPROFEN 800 MG: 600 TABLET ORAL at 13:04

## 2022-05-18 RX ADMIN — OXYCODONE HYDROCHLORIDE 10 MG: 10 TABLET ORAL at 07:47

## 2022-05-18 NOTE — PROGRESS NOTES
Pt arrived to 8A. A/Ox's 4. Pt rated pain as tolerable. Tylenol, Ibuprofen and Heat backs given for pain control. Pt is on 1L NC. Oxygen would dip to 88/89 on RA. Abd incisions CDI. Pt's  has the discharge meds. CMS intact. Tolerated regular diet. Denied any nausea, CP, SOB, lightheadedness or dizziness. Voiding without pain or difficulty.  Up with SBA. Resting in bed at this time with call light in reach. Able to make needs known. Continue to monitor.

## 2022-05-18 NOTE — PROGRESS NOTES
GYN PROGRESS NOTE    Date: 5/18/22  POD#1 s/p RA-TLH, BS, TVT, cysto    Disease: AUB, stress incontinence    24 hour events:   - Underwent above stated surgery  - Admitted for postoperative observation given hypoventilation in PACU    Subjective: Patient is feeling well this morning although still quite tired.  Pain is overall controlled with PO medications, still fairly sore when moving around. Eating and drinking normally.  Not yet passing flatus or bowel movements. Voiding spontaneously, has ambulated to bathroom independently.     Objective:   Patient Vitals for the past 24 hrs:   BP Temp Temp src Pulse Resp SpO2   05/18/22 0106 130/70 -- -- 90 -- 94 %   05/17/22 2100 119/67 -- -- 63 -- 93 %   05/17/22 2030 114/66 -- -- 75 -- --   05/17/22 2000 118/68 -- -- 64 -- 93 %   05/17/22 1950 -- -- -- -- -- (!) 88 %   05/17/22 1945 115/65 -- -- 65 -- --   05/17/22 1930 123/64 -- -- 77 -- --   05/17/22 1926 133/73 98.2  F (36.8  C) Oral 84 15 94 %   05/17/22 1910 -- 99.3  F (37.4  C) -- -- -- --   05/17/22 1900 -- -- -- -- -- 94 %   05/17/22 1855 -- -- -- -- -- 93 %   05/17/22 1850 -- -- -- -- -- 93 %   05/17/22 1845 -- -- -- -- -- 92 %   05/17/22 1830 -- -- -- -- -- 94 %   05/17/22 1825 -- -- -- -- -- 91 %   05/17/22 1820 -- -- -- -- -- 92 %   05/17/22 1815 -- -- -- -- -- (!) 89 %   05/17/22 1810 -- -- -- -- -- 90 %   05/17/22 1805 -- -- -- -- -- 93 %   05/17/22 1800 -- -- -- -- -- (!) 89 %   05/17/22 1745 -- -- -- -- -- 90 %   05/17/22 1730 -- -- -- -- -- 95 %   05/17/22 1715 -- -- -- -- -- 92 %   05/17/22 1707 -- -- -- -- -- (!) 85 %   05/17/22 1630 112/69 -- -- 68 -- 94 %   05/17/22 1615 -- -- -- -- -- (!) 86 %   05/17/22 1600 111/75 -- -- 70 -- 95 %   05/17/22 1550 -- -- -- -- -- (!) 89 %   05/17/22 1545 -- -- -- -- -- 94 %   05/17/22 1530 110/65 -- -- 63 -- 90 %   05/17/22 1515 -- -- -- -- -- 95 %   05/17/22 1500 116/74 -- -- 75 12 92 %   05/17/22 1445 -- 97.3  F (36.3  C) Oral --  -- 96 %   05/17/22 1443 -- -- -- 79 16 96 %   05/17/22 1435 115/74 -- -- 66 27 94 %   05/17/22 1430 108/68 -- -- 65 27 95 %   05/17/22 1415 116/59 98.2  F (36.8  C) Oral 71 13 94 %   05/17/22 1400 99/66 -- -- 75 10 96 %   05/17/22 1345 108/69 -- -- 81 14 95 %   05/17/22 1330 118/49 -- -- 92 25 95 %   05/17/22 1315 123/72 -- -- 90 19 95 %   05/17/22 1300 107/82 -- -- 81 15 94 %   05/17/22 1245 124/68 -- -- 90 19 94 %   05/17/22 1230 117/71 -- -- 86 13 94 %   05/17/22 1215 111/57 -- -- 90 30 95 %   05/17/22 1206 94/82 97.5  F (36.4  C) Axillary 92 14 92 %     I/O last 3 completed shifts:  In: 2110 [P.O.:460; I.V.:1650]  Out: 1060 [Urine:1010; Blood:50]  + 2 unmeasured voids    Gen: alert and oriented, resting in bed  Cardio: rrr, nl s1 and s2, no m/r/g  Resp: lungs CTAB, no wheezes or crackles  Abdomen: soft, appropriately tender to palpation, mildly distended  Incisions: Clean, dry, intact with skin glue in place   Extremities: BLEs non-tender, trace bilateral LE edema  Lines/drains: PIV    New Labs/Imaging-   Hemoglobin 11.7    Assessment: Zaida Lao is a 47 year old POD#1 from -[facility], BS, TVT, cysto, admitted for postoperative observation given hypoventilation. Oxygen turned off with adequate saturations this morning, meeting all other postoperative goals for discharge.    Plan:    FEN: Regular diet  Pain: Tylenol, ibuprofen, oxycodone prn  Heme: Hgb 13.9> EBL 50> 11.7. No signs or symptoms of ongoing bleeding  CV: HTN, home lisinopril held  Pulm: Hx LOC, not on CPAP. Suspect desaturations due to residual symptoms, encouraged follow-up with PCP and use of IS. Now stable on RA  GI: Antiemetics prn, bowel regimen  : Voiding s/p Schumacher, no issues  ID: Afebrile, s/p perioperative antibiotics   Endo: No acute issues  Psych/Neuro: Depression/anxiety, home sertraline ordered. Home anxiolytics held  PPX: Encouraged ambulation and IS  Dispo: Home today if she continues to be stable this morning on room air      Discussed with Dr. Cooper.    Maria De Jesus Cabrera MD  Ob/Gyn Resident, PGY-4  05/18/22 7:30 AM

## 2022-05-18 NOTE — PLAN OF CARE
Goal Outcome Evaluation:    Plan of Care Reviewed With: patient     A&Ox4- CMS intact. Denies numbness/tingling, nausea/vomiting, SOB, and chest pain.  LBM: 5/17 around 0600 passing gas   Pain managed with oxy, scheduled tylenol, and ibuprofen.   Pt does not feel comfortable going home tonight due to pain- her  also has concerns regarding her O2 sats. OBGYN notified and states that it ok to stay overnight if she does not feel ready.    brought home CPAP, but pt says she does not want to use it tonight.   Plan: likely discharge 5/19 AM.    ~ Patient vital signs are at baseline MET  ~ Patient able to ambulate as they were prior to admission or with assist devices provided by therapies during their stay.   ~ If not being discharged with Schumacher catheter, patient able to void MET  ~ Patient able to tolerate oral intake to maintain hydration status MET  ~ Patient has adequate pain control using oral analgesics   ~ Hypercapnia, hypoventilation or hypoxia reso solved for at least 2 hours without supplemental oxygen MET X; when sleeping  ~ Deficits in sensation, mobility or coordination have resolved if spinal or regional anesthesia was used MET  ~ Patient has returned to baseline mental status MET

## 2022-05-18 NOTE — PLAN OF CARE
Discharge goals:  Patient vital signs are at baseline : Yes, VSS  ~ Patient able to ambulate:Yes, Pt ambulated in room and in aguero with transfer belt and stand by assist of 1. Gait steady  ~  Pt voiding without problems.Yes, Urine slightly concentrated~ Encourage oral intake. Denies nausea. Scope patch on.    ~ Patient c/o abd pain. Rates pain 8/10 with activity. Describes pain as sharp with burning. Abd non distended. No flatus. Stool softeners given.   ~ Pt on RA. SATs 93-97%. Pt does Desats when sleeping to 88% occassionally but jumps back to 95% when awake. Continuous pulse ox on.  ~  Patient has returned to baseline mental status :Yes, Pt alert and oriented. Able to make her needs known Call light within reach. Plan to discharge later today.    1300 Pt states she still has burning and sharp pain/ discomfort in left lower quadrant of abdomen abd soft. BS+. Pt states passing flatus. Scope sites intact. Dura bond and SUSHILA. No drainage. Abd binder readjusted and presently open while patient  Lying in bed. Medicated with oxycodone 10 mg. Tylenol and Ibuprofen scheduled. Ice pack to abd. Patient rates pain 8/10 but states it goes down to 5/10 after pain mediation.  Pt continues to DeSats to 88% when sleeping. 93-97% when awake.Pt on RA.

## 2022-05-18 NOTE — PHARMACY-ADMISSION MEDICATION HISTORY
Admission Medication History Completed by Pharmacy    See [facility] Admission Navigator for allergy information, preferred outpatient pharmacy, prior to admission medications and immunization status.     Medication History Sources:     Patient    Fill history    Changes made to PTA medication list (reason):    Added: Acetaminophen and Ibuprofen as needed    Deleted: None    Changed: Hydroxyzine 1-2 tabs-->2 tabs    Additional Information:    None    Prior to Admission medications    Medication Sig Last Dose Taking? Auth Provider   acetaminophen (TYLENOL) 500 MG tablet Take 1,000 mg by mouth daily as needed for mild pain Past Month at Unknown time Yes Unknown, Entered By History   albuterol (PROAIR HFA/PROVENTIL HFA/VENTOLIN HFA) 108 (90 Base) MCG/ACT inhaler Inhale 1-2 puffs into the lungs every 6 hours More than a month at Unknown time Yes Yara Tinajero NP   ALPRAZolam (XANAX) 0.25 MG tablet Take 1 tablet (0.25 mg) by mouth daily as needed for anxiety Do not mix with alcohol or drive after taking More than a month at Unknown time Yes Yara Tinajero NP   fluticasone (FLONASE) 50 MCG/ACT spray Spray 1 spray into both nostrils daily Past Month at Unknown time Yes Reported, Patient   hydrOXYzine (ATARAX) 25 MG tablet TAKE 1-2 TABLETS (25-50 MG) BY MOUTH NIGHTLY AS NEEDED FOR ITCHING  Patient taking differently: Take 50 mg by mouth nightly as needed for anxiety or other (Sleep) Past Month at Unknown time Yes Yara Tinajero NP   ibuprofen (ADVIL/MOTRIN) 200 MG tablet Take 600 mg by mouth daily as needed for mild pain Past Week at Unknown time Yes Unknown, Entered By History   levocetirizine (XYZAL) 5 MG tablet Take 1 tablet by mouth daily 5/16/2022 at 0630 Yes Reported, Patient   lisinopril (ZESTRIL) 5 MG tablet Take 1 tablet (5 mg) by mouth daily 5/16/2022 at 0800 Yes Yara Tinajero NP   sertraline (ZOLOFT) 100 MG tablet Take 2 tablets (200 mg) by mouth daily 5/16/2022 at 0445 Yes Yara Tinajero NP       Date  completed: 05/18/22    Medication history completed by: Des Hagen [facility]

## 2022-05-19 VITALS
HEART RATE: 74 BPM | RESPIRATION RATE: 15 BRPM | SYSTOLIC BLOOD PRESSURE: 131 MMHG | TEMPERATURE: 98.1 F | DIASTOLIC BLOOD PRESSURE: 81 MMHG | BODY MASS INDEX: 42.13 KG/M2 | WEIGHT: 262.13 LBS | HEIGHT: 66 IN | OXYGEN SATURATION: 93 %

## 2022-05-19 PROCEDURE — 250N000013 HC RX MED GY IP 250 OP 250 PS 637: Performed by: STUDENT IN AN ORGANIZED HEALTH CARE EDUCATION/TRAINING PROGRAM

## 2022-05-19 PROCEDURE — 120N000002 HC R&B MED SURG/OB UMMC

## 2022-05-19 RX ADMIN — SERTRALINE HYDROCHLORIDE 200 MG: 100 TABLET ORAL at 07:48

## 2022-05-19 RX ADMIN — SENNOSIDES AND DOCUSATE SODIUM 1 TABLET: 50; 8.6 TABLET ORAL at 07:48

## 2022-05-19 RX ADMIN — OXYCODONE HYDROCHLORIDE 5 MG: 5 TABLET ORAL at 06:11

## 2022-05-19 RX ADMIN — ACETAMINOPHEN 975 MG: 325 TABLET ORAL at 06:11

## 2022-05-19 RX ADMIN — FLUTICASONE PROPIONATE 1 SPRAY: 50 SPRAY, METERED NASAL at 07:48

## 2022-05-19 RX ADMIN — IBUPROFEN 800 MG: 600 TABLET ORAL at 00:33

## 2022-05-19 RX ADMIN — OXYCODONE HYDROCHLORIDE 10 MG: 10 TABLET ORAL at 11:34

## 2022-05-19 RX ADMIN — IBUPROFEN 800 MG: 600 TABLET ORAL at 06:11

## 2022-05-19 ASSESSMENT — ACTIVITIES OF DAILY LIVING (ADL): ADLS_ACUITY_SCORE: 38

## 2022-05-19 NOTE — PLAN OF CARE
Pt. discharged at 1150 am to home with spouse.  Pt. was accompanied by spouse, and left with personal belongings.  Prior to discharge, PIV was removed.  Pt. received complete discharge paperwork and all medications as filled by discharge pharmacy.  Pt. was given times of last dose for all discharge medications in writing on discharge medication sheets.  Discharge teaching included all medication, pain management, activity restrictions, dressing changes, and signs and symptoms of infection.  Pt. Aware of follow up appointments.  Pt. had no further questions at the time of discharge and no unmet needs were identified.

## 2022-05-19 NOTE — PROGRESS NOTES
GYN PROGRESS NOTE    Date: 5/18/22  POD#2 s/p [facility], BS, TVT, cysto    Disease: AUB, stress incontinence    24 hour events:   - Stayed overnight for pain control     Subjective: Pain is overall controlled with PO medications, still fairly sore when moving around. Eating and drinking normally. Passing flatus. Voiding spontaneously without sensation of incompletely emptying after voiding. Ambulated without lightheadedness or dizziness.     Objective:   Patient Vitals for the past 24 hrs:   BP Temp Temp src Pulse Resp SpO2   05/18/22 1547 114/66 98.8  F (37.1  C) Oral 68 16 92 %   05/18/22 1217 115/75 98.1  F (36.7  C) Oral 62 15 95 %   05/18/22 0746 107/64 99.1  F (37.3  C) Oral 68 18 95 %       Gen: alert and oriented, resting in bed  Cardio: well profused  Resp: normal respiratory efforts  Abdomen: soft, appropriately tender to palpation, mildly distended  Incisions: Clean, dry, intact with skin glue in place   Extremities: BLEs non-tender, trace bilateral LE edema    New Labs/Imaging-   Hemoglobin 11.7    Assessment: Zaida Lao is a 47 year old POD#2 from -[facility], BS, TVT, cysto, admitted for postoperative observation given hypoventilation. Meeting all other postoperative goals for discharge.    Plan:    FEN: Regular diet  Pain: Tylenol, ibuprofen, oxycodone prn  Heme: Hgb 13.9> EBL 50> 11.7. No signs or symptoms of ongoing bleeding  CV: HTN, home lisinopril held  Pulm: Hx LOC, not on CPAP. Suspect desaturations due to residual symptoms, encouraged follow-up with PCP and use of IS. Now stable on RA  GI: Antiemetics prn, bowel regimen  : Voiding s/p Schumacher, no issues  ID: Afebrile, s/p perioperative antibiotics   Endo: No acute issues  Psych/Neuro: Depression/anxiety, home sertraline ordered. Home anxiolytics held  PPX: Encouraged ambulation and IS  Dispo: Home today     Yady Conthe MD  UMN OBGYN PGY-4  Gyn resident pager (weekday 6AM-6PM): [phone]  OB G3 pager (weeknight  6PM-6AM, weekend): [phone]  05/19/2022 7:09 AM       I have seen, examined, and counseled the patient on the day of discharge. I have reviewed and edited the summary.  Sofia Farley

## 2022-05-19 NOTE — DISCHARGE SUMMARY
[facility]  Gynecology Discharge Summary    Admission Date:  22  Discharge Date:  22    Admission Attending: Doreen Singh MD  Discharge Attending: Sofia Farley MD    Admission Diagnosis:  - Abnormal uterine bleeding, s/p below stated procedure  - Stress incontinence  - Postoperative hypoventilation    Discharge Diagnosis:  - Same as above, hypoventilation improved    Procedures Performed:  - Robot-assisted total laparoscopic hysterectomy, bilateral salpingectomy; transvaginal midurethral sling placement, cystoscopy  - TAP block    Admission History:  Zaida Lao is a 47 year old  who presented to clinic with abnormal uterine bleeding, desiring definitive management. Endometrial biopsy was benign. She also complained of stress incontinence and was seen by Urogynecology for discussion of transvaginal sling placement. The above stated procedure was recommended. She was counseled on the risks, benefits, and alternatives and desired to proceed. Written informed consent was signed.    Operative Course:  She underwent the above stated procedure, which was uncomplicated. EBL was 50 cc.    Operative Findings:   Laparoscopic view with no evidence of injury on abdominal entry, small omental adhesion to anterior abdominal wall, normal appearing liver, omentum, bowel. Normal appearing uterus, bilateral ovaries with left dominant follicle, bilateral salpinges with evidence of previous surgical interruption. Moderate to dense adhesions between bladder and lower uterine segment. Cystoscopy at end of case with bilateral ureteral jets visualized, no evidence of bladder injury.    Hospital Course:  Her postoperative course was complicated by hypoventilation in the PACU. She was admitted for postoperative observation. Her oxygen was weaned to room air by POD#1. She passed an active trial of void on PACU. On POD#0, diet was advanced and she was transitioned to PO pain  medications. Patient stayed till POD#2 due to pain control and rare oxygen desaturation on room air. By POD#2 she was tolerating regular diet, ambulating without difficulty, voiding spontaneously, and controlling pain with PO medications, maintaining her oxygen saturation, and she was deemed stable for discharge. Hemoglobin at the time of discharge was 11.7.    Discharge Plans:  - The patient was discharged to home  - PO Activity:   - No lifting >15 lbs or strenuous exercise for 6 weeks   - No driving while taking narcotics or until you can slam on the brakes without pain  - She was instructed to call [facility] or return to ED if she has any of the following:    - Temperature greater than 100.4F   - Pain not controlled by pain medications   - Uncontrolled nausea/vomiting   - Foul-smelling vaginal discharge   - Vaginal bleeding soaking 1 pad per hour for 2 hours in a row  - She will follow up with Dr. Coyne and Dr. Thurston in 2-4 weeks    - Discharge medications include:     Medication List      Started    oxyCODONE 5 MG tablet  Commonly known as: ROXICODONE  5 mg, Oral, EVERY 6 HOURS PRN     SENNA-docusate sodium 8.6-50 MG tablet  Commonly known as: SENNA S  1-2 tablets, Oral, 2 TIMES DAILY PRN        Modified    acetaminophen 325 MG tablet  Commonly known as: TYLENOL  650 mg, Oral, EVERY 6 HOURS PRN  What changed:     medication strength    how much to take    when to take this     hydrOXYzine 25 MG tablet  Commonly known as: ATARAX  25-50 mg, Oral, AT BEDTIME PRN  What changed:     how much to take    reasons to take this     ibuprofen 600 MG tablet  Commonly known as: ADVIL/MOTRIN  600 mg, Oral, EVERY 6 HOURS PRN  What changed:     medication strength    when to take this    reasons to take this          Yady Conteh MD  N OBGYN PGY-4  Gyn resident pager (weekday 6AM-6PM): [phone]  OB G3 pager (weeknight 6PM-6AM, weekend): [phone]  05/19/2022 7:11 AM     I have seen, examined, and counseled the patient on  the day of discharge. I have reviewed and edited the summary.  Sofia Farley

## 2022-05-19 NOTE — PLAN OF CARE
Patient is alert and oriented per baseline, and able to communicate needs. VSS. CMS intact. Pain managed with oxycodone. Denies SOB. Patient stated she is looking forward going home today. OB on call notified. Will continue with current POC.

## 2022-05-23 ENCOUNTER — TELEPHONE (OUTPATIENT)
Dept: FAMILY MEDICINE | Facility: CLINIC | Age: 47
End: 2022-05-23
Payer: COMMERCIAL

## 2022-05-23 NOTE — TELEPHONE ENCOUNTER
"It actually looks like Yara Tinajero is her PCP at this time, was last seen 9/24/21 by Yara for routine visit.  Due back for routine visit around 9/24/22.      ED for acute condition Discharge Protocol    \"Hi, my name is Raquel Oakes RN, a registered nurse, and I am calling from [facility].  I am calling to follow up and see how things are going for you after your recent emergency visit.\"    Tell me how you are doing now that you are home?\"  No concerns regarding surgery but says she had low oxygen levels after anesthesia; states she had a sleep study some years ago and saw ENT; used CPAP until ENT determined she needed tonsils and adenoids removed.  She had to stay an extra night to observe her oxygen.   She was put on supplemental oxygen during sleep due to desaturations.  She wonders if she continues to have low oxygen at night.    She is keeping an eye on just one of her numerous abdominal incisions that is still sore, denies fever, streaking redness, or drainage.  She does not feel she needs to be evaluated for this at this time.      Discharge Instructions    \"Let's review your discharge instructions.  What is/are the follow-up recommendations?  Pt. Response: is scheduled to see surgeon and OB early June    \"Has an appointment with your primary care provider been scheduled?\"  No (not needed)    Medications    \"Tell me what changed about your medicines when you discharged?\"    Was on pain meds, no longer needing those.    No longer on stool softener    Post Discharge Medication Reconciliation Status: discharge medications reconciled and changed, per note/orders.        \"What questions do you have about your medications?\"   None        Call Summary    \"What questions or concerns do you have about your recent visit and your follow-up care?\"     none    \"If you have questions or things don't continue to improve, we encourage you contact us through the main clinic number (give number).  Even " "if the clinic is not open, triage nurses are available 24/7 to help you.     We would like you to know that our clinic has extended hours (provide information).  We also have urgent care (provide details on closest location and hours/contact info)\"    \"Thank you for your time and take care!\"      Routed to PCP Yara Tinajero (verified this with patient) to address patient's concern about possible night time desaturations (has pas history and then had problems after surgery).   Referral for sleep study or pulmonology?    Raquel Oakes RN  [facility]                            "

## 2022-05-23 NOTE — TELEPHONE ENCOUNTER
If she cannot keep her oxygen levels up she needs to contact her surgeon or go in. She should also have a surgery follow up visit REBEKAH Orozco, FNP-BC

## 2022-05-23 NOTE — TELEPHONE ENCOUNTER
See admission notes, admitted 5/17-5/18 for hysterectomy:    Admission Date:           5/17/22  Discharge Date:            5/18/22     Admission Attending:  Doreen Singh MD  Discharge Attending:   Jazzmine Cooper     Admission Diagnosis:  - Abnormal uterine bleeding, s/p below stated procedure  - Stress incontinence  - Postoperative hypoventilation     Discharge Diagnosis:  - Same as above, hypoventilation improved     Procedures Performed:  - Robot-assisted total laparoscopic hysterectomy, bilateral salpingectomy; transvaginal midurethral sling placement, cystoscopy  - TAP block      No specific PCP follow up advised.    Routed to RN team to follow up with patient to see if she needs to follow up or has concerns.    Raquel Oakes RN  [facility]

## 2022-05-23 NOTE — TELEPHONE ENCOUNTER
Routed to PCP Yara Tinajero (verified this with patient) to address patient's concern about possible night time desaturations (has pas history and then had problems after surgery).   Referral for sleep study or pulmonology?    Raquel Oakes RN  [facility]

## 2022-05-23 NOTE — TELEPHONE ENCOUNTER
Noted, the problem is she is not sure if she is having low oxygen at night or not.   Referral needed to pulmonology or sleep study?      She is scheduled to see surgeon on 6/6/22.    Routed back to PCP to clarify, should patient ask surgeon to address sleep study/pulmonology question?    Raquel Oakes RN  [facility]

## 2022-05-24 NOTE — TELEPHONE ENCOUNTER
Attempted to call patient at home/mobile number, no answer, left message on voicemail; patient was instructed to return call to [facility] at [phone].    Raquel Oakes RN  [facility]

## 2022-05-24 NOTE — TELEPHONE ENCOUNTER
(Pulmonology wait is November).  I have not seen her since last September, if she would like a sleep study ordered, she will need some type of visit. REBEKAH Orozco, FNP-BC

## 2022-05-24 NOTE — TELEPHONE ENCOUNTER
"Patient called back, she agrees to see PCP to discuss possible sleep study.    No regular openings for several weeks, scheduled for 6/3/22 in \"approval req\" spot for hosp follow up on oxygen issue    Patient verbalized understanding of and agreement with plan.    Raquel Oakes RN  [facility]      "

## 2022-05-27 NOTE — PROGRESS NOTES
"  Assessment & Plan     Screen for colon cancer    - KURT(EXACT SCIENCES)    Visit for screening mammogram    - MA SCREENING DIGITAL BILAT - Future  (s+30); Future    Nocturnal oxygen desaturation    - XR Chest 2 Views; Future  - Echo Stress Test with Definity; Future  - General PFT Lab (Please always keep checked); Future  - Pulmonary Function Test; Future  - Exercise Spirometry Test (GH); Future  - Adult Sleep Eval & Management  Referral; Future  - Overnight oximetry study; Future  - Adult Pulmonary Medicine Referral; Future       BMI:   Estimated body mass index is 41.89 kg/m  as calculated from the following:    Height as of 5/17/22: 1.676 m (5' 5.98\").    Weight as of this encounter: 117.7 kg (259 lb 6.4 oz).   Weight management plan: Discussed healthy diet and exercise guidelines    Depression Screening Follow Up    PHQ 6/3/2022   PHQ-9 Total Score 10   Q9: Thoughts of better off dead/self-harm past 2 weeks Not at all     Last PHQ-9 6/3/2022   1.  Little interest or pleasure in doing things 0   2.  Feeling down, depressed, or hopeless 3   3.  Trouble falling or staying asleep, or sleeping too much 1   4.  Feeling tired or having little energy 2   5.  Poor appetite or overeating 3   6.  Feeling bad about yourself 0   7.  Trouble concentrating 1   8.  Moving slowly or restless 0   Q9: Thoughts of better off dead/self-harm past 2 weeks 0   PHQ-9 Total Score 10   Difficulty at work, home, or with people -       Follow Up Actions Taken  Crisis resource information provided in After Visit Summary  Patient counseled, no additional follow up at this time.     See Patient Instructions: follow up as needed.  Discussed scheduling tests- we will let you know results and if abnormal a treatment plan. Schedule with pulmonary medicine.  Pt in agreement.     Return in about 3 months (around 9/3/2022), or if symptoms worsen or fail to improve.    SOPHIE Hernandes  [facility] CAROL Quintana "   Zaida is a 47 year old who presents for the following health issues     History of Present Illness       Reason for visit:  Post op appt    She eats 2-3 servings of fruits and vegetables daily.She consumes 0 sweetened beverage(s) daily.She exercises with enough effort to increase her heart rate 20 to 29 minutes per day.  She exercises with enough effort to increase her heart rate 3 or less days per week.   She is taking medications regularly.    Today's PHQ-9         PHQ-9 Total Score: 10    PHQ-9 Q9 Thoughts of better off dead/self-harm past 2 weeks :   Not at all    How difficult have these problems made it for you to do your work, take care of things at home, or get along with other people: Not difficult at all  Today's ROMANA-7 Score: 14         Hospital Follow-up Visit:    Hospital/Nursing Home/IP Rehab Facility: [facility]  Date of Admission: 5/17/22  Date of Discharge: 5/19/22  Reason(s) for Admission: hysterectomy-oxygen issues  She reports years ago, had at home sleep study that showed no apnea but that she desaturate in her sleep.  They gave her a cpap and that was it.  She tried the cpap, did not tolerate it/ felt it was not helping so she stopped using it.  She met with ENT who  Did nasal/sinus/tonsil surgery and was told that should resolve the issue. She never thought about it since. However, when she had her hysterectomy, it was supposed to be a same day surgery and they had to keep her over night as she kept desaturating when she would fall asleep- SpO2 in 80s.  When pregnant she was tested for cystic fibrosis which was negative.  No family hx of lung disease.  Used to be a social smoker, occasional ecig THC. No hx asthma. Never positive for COVID that she knows of. Family hx of heart issues on her dads side; age >50 years. She denies chest pain/ pain with deep breathing.  Used to do an exercise program and didn't have symptoms while exercising to make her  feel her O2 would be low.  Was in synchronized swimming as a teen and feels she may hold her breath at times without thinking. Discussed sleep study would pick that up if that was the cause of her desaturation.       Was your hospitalization related to COVID-19? No   Problems taking medications regularly:  None  Medication changes since discharge: None  Problems adhering to non-medication therapy:  None    Summary of hospitalization:  [facility] discharge summary reviewed  Diagnostic Tests/Treatments reviewed.  Follow up needed: follow up surgeon Monday  Other Healthcare Providers Involved in Patient s Care:         None  Update since discharge: improved. Post Discharge Medication Reconciliation: discharge medications reconciled, continue medications without change.  Plan of care communicated with patient              Review of Systems   Constitutional, HEENT, cardiovascular, pulmonary, GI, , musculoskeletal, neuro, skin, endocrine and psych systems are negative, except as otherwise noted.      Objective    /84   Pulse 89   Temp 97.4  F (36.3  C) (Tympanic)   Resp 16   Wt 117.7 kg (259 lb 6.4 oz)   LMP 04/25/2022 (Exact Date)   SpO2 97%   BMI 41.89 kg/m    Body mass index is 41.89 kg/m .  Physical Exam   GENERAL: Healthy, alert and no distress  EYES: Eyes grossly normal to inspection.  No discharge or erythema, or obvious scleral/conjunctival abnormalities.  RESP: No audible wheeze, cough, or visible cyanosis.  No visible retractions or increased work of breathing.    SKIN: Visible skin clear. No significant rash, abnormal pigmentation or lesions.  NEURO: Cranial nerves grossly intact.  Mentation and speech appropriate for age.  PSYCH: Mentation appears normal, affect normal/bright, judgement and insight intact, normal speech and appearance well-groomed.       See orders- chest xray, sleep study, pulmonary function test, cardiac stress test          Patient answers are not available  for this visit.

## 2022-05-30 ENCOUNTER — TELEPHONE (OUTPATIENT)
Dept: OBGYN | Facility: CLINIC | Age: 47
End: 2022-05-30
Payer: COMMERCIAL

## 2022-05-30 DIAGNOSIS — G89.18 POSTOPERATIVE PAIN: Primary | ICD-10-CM

## 2022-05-30 RX ORDER — OXYCODONE HYDROCHLORIDE 5 MG/1
5 TABLET ORAL EVERY 6 HOURS PRN
Qty: 6 TABLET | Refills: 0 | Status: SHIPPED | OUTPATIENT
Start: 2022-05-30 | End: 2022-06-02

## 2022-05-31 ENCOUNTER — OFFICE VISIT (OUTPATIENT)
Dept: OBGYN | Facility: CLINIC | Age: 47
End: 2022-05-31
Attending: OBSTETRICS & GYNECOLOGY
Payer: COMMERCIAL

## 2022-05-31 VITALS
WEIGHT: 264 LBS | HEART RATE: 98 BPM | BODY MASS INDEX: 42.63 KG/M2 | SYSTOLIC BLOOD PRESSURE: 138 MMHG | DIASTOLIC BLOOD PRESSURE: 85 MMHG

## 2022-05-31 DIAGNOSIS — G89.18 POSTOPERATIVE PAIN: ICD-10-CM

## 2022-05-31 DIAGNOSIS — Z90.710 S/P LAPAROSCOPIC HYSTERECTOMY: Primary | ICD-10-CM

## 2022-05-31 PROCEDURE — G0463 HOSPITAL OUTPT CLINIC VISIT: HCPCS

## 2022-05-31 PROCEDURE — 250N000011 HC RX IP 250 OP 636: Performed by: STUDENT IN AN ORGANIZED HEALTH CARE EDUCATION/TRAINING PROGRAM

## 2022-05-31 PROCEDURE — 99024 POSTOP FOLLOW-UP VISIT: CPT | Mod: GC | Performed by: OBSTETRICS & GYNECOLOGY

## 2022-05-31 RX ORDER — TRIAMCINOLONE ACETONIDE 40 MG/ML
40 INJECTION, SUSPENSION INTRA-ARTICULAR; INTRAMUSCULAR ONCE
Status: COMPLETED | OUTPATIENT
Start: 2022-05-31 | End: 2022-05-31

## 2022-05-31 RX ADMIN — TRIAMCINOLONE ACETONIDE 40 MG: 40 INJECTION, SUSPENSION INTRA-ARTICULAR; INTRAMUSCULAR at 14:02

## 2022-05-31 ASSESSMENT — PAIN SCALES - GENERAL: PAINLEVEL: EXTREME PAIN (8)

## 2022-05-31 NOTE — TELEPHONE ENCOUNTER
Called to discuss appointment with patient, she is available to come in for evaluation at 1:15 today with .

## 2022-05-31 NOTE — TELEPHONE ENCOUNTER
Patient call with increased pain to 8 or 9 out of 10 at one of the upper port sites.  She describes it as a tearing feeling.  Denies fever, chills, N/V, tolerating regular diet and having BMs. She denies redness or drainage at the incision site. Discussed reasons to go to the ED and patient hoping to get scheduled in clinic tomorrow.  I will forward this to RN team urgently for arranging clinic exam tomorrow, possible lidocaine injection.  Patient request pain medication for overnight, sent to [facility].  Patient cautioned not to take narcotic and alprazolam together.  Patient agrees with plan.  Mariela Schmid MD

## 2022-05-31 NOTE — LETTER
5/31/2022       RE: Zaiad Lao  [address] [facility] [place] [address]     Dear Colleague,    Thank you for referring your patient, Zaida Lao, to the [facility] [place] at [facility]. Please see a copy of my visit note below.    Gynecologic Follow Up visit    S: She is s/p AMBER CHOWDARY, BS, transvaginal sling 2 weeks ago. Pain was improving initially but starting 3 days ago had worsening pain at LUQ port site. This site was always the most painful, however 3 days ago it became much worse. It is an 8/10 pain that is sharp/searing/ripping with movement especially transferring up and down. Pain is associated with nausea. Called in last night to get Rx for Oxy which she took last night and this AM which helped mildly. She has also been using ice packs frequently.     She denies fevers, chills, CP, SOB, vomiting, is having normal BMs and no concerns with urination.     O:  Vitals:    05/31/22 1317   BP: 138/85   Pulse: 98   Weight: 119.7 kg (264 lb)         Gen: alert and oriented, sitting up in chair holding ice pack  Cardio: RRR  Resp: Normal work of breathing  Abdomen: soft, nondistended, Moderately tender to palpation medial to LUQ incision site   Incision: 4 robotic incision sites well healing. Dermabond removed. No erythema, drainage, fluid collection, or induration noted at LUQ incision site. Other incisions also well healed without issues.   - Site 1cm medial to LUQ incision swabbed with betadine and injected with 10cc 1% lidocaine + 1cc 40mg Kenalog  Extremities: BLEs non-tender       A: 47 year old POD#14 s/p AMBER CHOWDARY, BS, transvaginal sling, cystoscopy. Post op course complicated by postoperative hypoventilation, now resolved. Now with worsening LUQ pain at 12mm port site, most consistent with nerve entrapment pain. Local injection with lidocaine + steroid today. No signs/symptoms of infection, dehiscence, hernia.     #Post  operative pain  - Start scheduled ibuprofen, tylenol and oxycodone prn   - Likely nerve entrapment, s/p local injection today  - If pain is not improved or worsens in next 2-3 days, recommend imaging with CTAP  - If febrile, N/V, not passing gas, recommended to call in and would also consider imaging  - Keep follow up appointments for next week     Staffed with Dr. Coyne    RTC for post op appointment 6/6 with Dr. Coyne and Dr. Karena Batista MD PGY3  Obstetrics & Gynecology  05/31/22

## 2022-05-31 NOTE — PROGRESS NOTES
Gynecologic Follow Up visit    S: She is s/p AMBER CHOWDARY, BS, transvaginal sling 2 weeks ago. Pain was improving initially but starting 3 days ago had worsening pain at LUQ port site. This site was always the most painful, however 3 days ago it became much worse. It is an 8/10 pain that is sharp/searing/ripping with movement especially transferring up and down. Pain is associated with nausea. Called in last night to get Rx for Oxy which she took last night and this AM which helped mildly. She has also been using ice packs frequently.     She denies fevers, chills, CP, SOB, vomiting, is having normal BMs and no concerns with urination.     O:  Vitals:    05/31/22 1317   BP: 138/85   Pulse: 98   Weight: 119.7 kg (264 lb)         Gen: alert and oriented, sitting up in chair holding ice pack  Cardio: RRR  Resp: Normal work of breathing  Abdomen: soft, nondistended, Moderately tender to palpation medial to LUQ incision site   Incision: 4 robotic incision sites well healing. Dermabond removed. No erythema, drainage, fluid collection, or induration noted at LUQ incision site. Other incisions also well healed without issues.   - Site 1cm medial to LUQ incision swabbed with betadine and injected with 10cc 1% lidocaine + 1cc 40mg Kenalog  Extremities: BLEs non-tender       A: 47 year old POD#14 s/p AMBER CHOWDARY, BS, transvaginal sling, cystoscopy. Post op course complicated by postoperative hypoventilation, now resolved. Now with worsening LUQ pain at 12mm port site, most consistent with nerve entrapment pain. Local injection with lidocaine + steroid today. No signs/symptoms of infection, dehiscence, hernia.     #Post operative pain  - Start scheduled ibuprofen, tylenol and oxycodone prn   - Likely nerve entrapment, s/p local injection today  - If pain is not improved or worsens in next 2-3 days, recommend imaging with CTAP  - If febrile, N/V, not passing gas, recommended to call in and would also consider imaging  - Keep follow up  appointments for next week     Staffed with Dr. Coyne    RTC for post op appointment 6/6 with Dr. Coyne and Dr. Karena Batista MD PGY3  Obstetrics & Gynecology  05/31/22     The Patient was seen in [facility] by ELIUD BATISTA.  I reviewed the history & exam. Assessment and plan were jointly made.  I was present for procedure.     Shahida Coyne MD

## 2022-06-03 ENCOUNTER — OFFICE VISIT (OUTPATIENT)
Dept: FAMILY MEDICINE | Facility: CLINIC | Age: 47
End: 2022-06-03
Payer: COMMERCIAL

## 2022-06-03 ENCOUNTER — ANCILLARY PROCEDURE (OUTPATIENT)
Dept: MAMMOGRAPHY | Facility: HOSPITAL | Age: 47
End: 2022-06-03
Attending: NURSE PRACTITIONER
Payer: COMMERCIAL

## 2022-06-03 VITALS
BODY MASS INDEX: 41.89 KG/M2 | RESPIRATION RATE: 16 BRPM | SYSTOLIC BLOOD PRESSURE: 129 MMHG | DIASTOLIC BLOOD PRESSURE: 84 MMHG | TEMPERATURE: 97.4 F | WEIGHT: 259.4 LBS | OXYGEN SATURATION: 97 % | HEART RATE: 89 BPM

## 2022-06-03 DIAGNOSIS — Z12.11 SCREEN FOR COLON CANCER: ICD-10-CM

## 2022-06-03 DIAGNOSIS — Z12.31 VISIT FOR SCREENING MAMMOGRAM: ICD-10-CM

## 2022-06-03 DIAGNOSIS — G47.34 NOCTURNAL OXYGEN DESATURATION: Primary | ICD-10-CM

## 2022-06-03 PROCEDURE — 99214 OFFICE O/P EST MOD 30 MIN: CPT | Performed by: NURSE PRACTITIONER

## 2022-06-03 PROCEDURE — 96127 BRIEF EMOTIONAL/BEHAV ASSMT: CPT | Performed by: NURSE PRACTITIONER

## 2022-06-03 PROCEDURE — 77067 SCR MAMMO BI INCL CAD: CPT

## 2022-06-03 ASSESSMENT — ANXIETY QUESTIONNAIRES
3. WORRYING TOO MUCH ABOUT DIFFERENT THINGS: NEARLY EVERY DAY
7. FEELING AFRAID AS IF SOMETHING AWFUL MIGHT HAPPEN: MORE THAN HALF THE DAYS
8. IF YOU CHECKED OFF ANY PROBLEMS, HOW DIFFICULT HAVE THESE MADE IT FOR YOU TO DO YOUR WORK, TAKE CARE OF THINGS AT HOME, OR GET ALONG WITH OTHER PEOPLE?: NOT DIFFICULT AT ALL
1. FEELING NERVOUS, ANXIOUS, OR ON EDGE: NEARLY EVERY DAY
7. FEELING AFRAID AS IF SOMETHING AWFUL MIGHT HAPPEN: MORE THAN HALF THE DAYS
8. IF YOU CHECKED OFF ANY PROBLEMS, HOW DIFFICULT HAVE THESE MADE IT FOR YOU TO DO YOUR WORK, TAKE CARE OF THINGS AT HOME, OR GET ALONG WITH OTHER PEOPLE?: NOT DIFFICULT AT ALL
GAD7 TOTAL SCORE: 14
5. BEING SO RESTLESS THAT IT IS HARD TO SIT STILL: NOT AT ALL
GAD7 TOTAL SCORE: 14
GAD7 TOTAL SCORE: 14
3. WORRYING TOO MUCH ABOUT DIFFERENT THINGS: NEARLY EVERY DAY
GAD7 TOTAL SCORE: 14
GAD7 TOTAL SCORE: 14
4. TROUBLE RELAXING: MORE THAN HALF THE DAYS
1. FEELING NERVOUS, ANXIOUS, OR ON EDGE: NEARLY EVERY DAY
6. BECOMING EASILY ANNOYED OR IRRITABLE: SEVERAL DAYS
6. BECOMING EASILY ANNOYED OR IRRITABLE: SEVERAL DAYS
7. FEELING AFRAID AS IF SOMETHING AWFUL MIGHT HAPPEN: MORE THAN HALF THE DAYS
2. NOT BEING ABLE TO STOP OR CONTROL WORRYING: NEARLY EVERY DAY
7. FEELING AFRAID AS IF SOMETHING AWFUL MIGHT HAPPEN: MORE THAN HALF THE DAYS
2. NOT BEING ABLE TO STOP OR CONTROL WORRYING: NEARLY EVERY DAY
4. TROUBLE RELAXING: MORE THAN HALF THE DAYS
5. BEING SO RESTLESS THAT IT IS HARD TO SIT STILL: NOT AT ALL
GAD7 TOTAL SCORE: 14

## 2022-06-03 ASSESSMENT — PATIENT HEALTH QUESTIONNAIRE - PHQ9
10. IF YOU CHECKED OFF ANY PROBLEMS, HOW DIFFICULT HAVE THESE PROBLEMS MADE IT FOR YOU TO DO YOUR WORK, TAKE CARE OF THINGS AT HOME, OR GET ALONG WITH OTHER PEOPLE: NOT DIFFICULT AT ALL
SUM OF ALL RESPONSES TO PHQ QUESTIONS 1-9: 10
SUM OF ALL RESPONSES TO PHQ QUESTIONS 1-9: 10

## 2022-06-03 ASSESSMENT — PAIN SCALES - GENERAL: PAINLEVEL: NO PAIN (0)

## 2022-06-06 ENCOUNTER — OFFICE VISIT (OUTPATIENT)
Dept: OBGYN | Facility: CLINIC | Age: 47
End: 2022-06-06
Attending: OBSTETRICS & GYNECOLOGY
Payer: COMMERCIAL

## 2022-06-06 VITALS
SYSTOLIC BLOOD PRESSURE: 124 MMHG | HEART RATE: 91 BPM | WEIGHT: 263 LBS | DIASTOLIC BLOOD PRESSURE: 83 MMHG | BODY MASS INDEX: 42.47 KG/M2

## 2022-06-06 DIAGNOSIS — G89.18 POSTOPERATIVE PAIN: ICD-10-CM

## 2022-06-06 DIAGNOSIS — Z90.710 S/P LAPAROSCOPIC HYSTERECTOMY: Primary | ICD-10-CM

## 2022-06-06 PROCEDURE — G0463 HOSPITAL OUTPT CLINIC VISIT: HCPCS

## 2022-06-06 PROCEDURE — G0463 HOSPITAL OUTPT CLINIC VISIT: HCPCS | Mod: 25

## 2022-06-06 PROCEDURE — 250N000011 HC RX IP 250 OP 636: Performed by: OBSTETRICS & GYNECOLOGY

## 2022-06-06 PROCEDURE — 99024 POSTOP FOLLOW-UP VISIT: CPT | Performed by: OBSTETRICS & GYNECOLOGY

## 2022-06-06 PROCEDURE — 11900 INJECT SKIN LESIONS </W 7: CPT

## 2022-06-06 RX ORDER — TRIAMCINOLONE ACETONIDE 40 MG/ML
40 INJECTION, SUSPENSION INTRA-ARTICULAR; INTRAMUSCULAR ONCE
Status: COMPLETED | OUTPATIENT
Start: 2022-06-06 | End: 2022-06-06

## 2022-06-06 RX ADMIN — TRIAMCINOLONE ACETONIDE 40 MG: 40 INJECTION, SUSPENSION INTRA-ARTICULAR; INTRAMUSCULAR at 12:10

## 2022-06-06 ASSESSMENT — PAIN SCALES - GENERAL: PAINLEVEL: MODERATE PAIN (4)

## 2022-06-06 NOTE — LETTER
6/6/2022       RE: Zaida Lao  [address] [address]     Dear Colleague,    Thank you for referring your patient, Zaida Lao, to the [facility] [place] at [facility]. Please see a copy of my visit note below.    Chief Complaint   Patient presents with     Post Partum Exam     DOS 5/17/2022   Alanis Ramirez LPN  Answers for HPI/ROS submitted by the patient on 6/3/2022  ROMANA 7 TOTAL SCORE: 14    S: pt continues to have pain at lap port in mid left upper quadrant.  Searing and hot sensation with movements. Pressure on it improves the pain.    /83   Pulse 91   Wt 119.3 kg (263 lb)   LMP 04/25/2022 (Exact Date)   Breastfeeding No   BMI 42.47 kg/m    Left upper laparoscopic incision very tender to palpation. No hernia palpable. Suture material palpated on fascia layer. Very tender.     Incision: No erythema, drainage, fluid collection, or induration noted at LUQ incision site. Other incisions also well healed without issues.   - Site 1cm medial/and superior to LUQ incision swabbed with betadine and injected with 10cc 1% lidocaine + 1cc 40mg Kenalog    A/p: post op pain. Concern for nerve entrapment/irritation    Orders Placed This Encounter   Procedures     Physiatry Referral     Shahida Coyne MD, FACOG  (she/her/hers)

## 2022-06-06 NOTE — NURSING NOTE
Chief Complaint   Patient presents with     Post Partum Exam     DOS 5/17/2022   Alanis Ramirez LPN

## 2022-06-06 NOTE — PROGRESS NOTES
Chief Complaint   Patient presents with     Post Partum Exam     DOS 5/17/2022   Alanis Ramirez LPN  Answers for HPI/ROS submitted by the patient on 6/3/2022  ROMANA 7 TOTAL SCORE: 14    S: pt continues to have pain at lap port in mid left upper quadrant.  Searing and hot sensation with movements. Pressure on it improves the pain.    /83   Pulse 91   Wt 119.3 kg (263 lb)   LMP 04/25/2022 (Exact Date)   Breastfeeding No   BMI 42.47 kg/m    Left upper laparoscopic incision very tender to palpation. No hernia palpable. Suture material palpated on fascia layer. Very tender.     Incision: No erythema, drainage, fluid collection, or induration noted at LUQ incision site. Other incisions also well healed without issues.   - Site 1cm medial/and superior to LUQ incision swabbed with betadine and injected with 10cc 1% lidocaine + 1cc 40mg Kenalog    A/p: post op pain. Concern for nerve entrapment/irritation    Orders Placed This Encounter   Procedures     Physiatry Referral     Shahida Coyne MD, FACOG  (she/her/hers)

## 2022-06-07 ENCOUNTER — TELEPHONE (OUTPATIENT)
Dept: PHYSICAL MEDICINE AND REHAB | Facility: CLINIC | Age: 47
End: 2022-06-07
Payer: COMMERCIAL

## 2022-06-07 ENCOUNTER — MYC MEDICAL ADVICE (OUTPATIENT)
Dept: FAMILY MEDICINE | Facility: CLINIC | Age: 47
End: 2022-06-07
Payer: COMMERCIAL

## 2022-06-07 DIAGNOSIS — G47.34 NOCTURNAL OXYGEN DESATURATION: Primary | ICD-10-CM

## 2022-06-07 NOTE — TELEPHONE ENCOUNTER
[facility] Call Center    Phone Message    May a detailed message be left on voicemail: yes     Reason for Call: Appointment Intake    Referring Provider Name: Stanford  Diagnosis and/or Symptoms: Postoperative pain , possible nerve entrapement pain    Per protocols, I am unable to schedule without clinic review.  Please review and contact patient to schedule as appropriate.    Action Taken: Other: [facility] and rehab    Travel Screening: Not Applicable

## 2022-06-08 ENCOUNTER — VIRTUAL VISIT (OUTPATIENT)
Dept: UROLOGY | Facility: CLINIC | Age: 47
End: 2022-06-08
Attending: OBSTETRICS & GYNECOLOGY
Payer: COMMERCIAL

## 2022-06-08 VITALS — BODY MASS INDEX: 41.28 KG/M2 | WEIGHT: 263 LBS | HEIGHT: 67 IN

## 2022-06-08 DIAGNOSIS — Z98.890 POST-OPERATIVE STATE: Primary | ICD-10-CM

## 2022-06-08 PROCEDURE — 99024 POSTOP FOLLOW-UP VISIT: CPT | Performed by: OBSTETRICS & GYNECOLOGY

## 2022-06-08 NOTE — LETTER
"6/8/2022       RE: Zaida Lao  [address] [address]     Dear Colleague,    Thank you for referring your patient, Zaida Lao, to the [facility] [place] at [facility]. Please see a copy of my visit note below.    Zaida is a 47 year old who is being evaluated via a billable telephone visit.      How would you like to obtain your AVS? MyChart  If the video visit is dropped, the invitation should be resent by: Text to cell phone: [phone]   Will anyone else be joining your video visit? No  Antonieta Rodriguez, Virtual Facilitator     June 8, 2022    Return visit    Patient returns today for f/u from retropubic midurethral sling. Since her last visit she has done well. No KING. Happy with surgery    Ht 1.702 m (5' 7\")   Wt 119.3 kg (263 lb)   LMP 04/25/2022 (Exact Date)   BMI 41.19 kg/m    She is comfortable, in no distress, non-labored breathing.      A/P: 47 year old F with s/p TVT    F/U PRN    I spent a total of 10 minutes with  Ms. Lao  on the date of the encounter in chart review, face to face patient visit, review of tests, documentation and/or discussion with other providers about the issues documented above.    Hany Thurston MD  Professor, OB/GYN  Urogynecologist    CC  Patient Care Team:  Yara Patel NP as PCP - General (Family Medicine)  Yara Patel NP as Assigned PCP  Hany Thurston MD as MD (OB/Gyn)  Hany Thurston MD as Assigned OBGYN Provider  Sofia Farley MD as MD (OB/Gyn)  Janeth Kay APRN CNP as Nurse Practitioner (Anesthesiology)  YARA PATEL    "

## 2022-06-08 NOTE — PROGRESS NOTES
"Zaida is a 47 year old who is being evaluated via a billable telephone visit.      How would you like to obtain your AVS? MyChart  If the video visit is dropped, the invitation should be resent by: Text to cell phone: [phone]   Will anyone else be joining your video visit? No  Antonieta Rodriguez, Virtual Facilitator     June 8, 2022    Return visit    Patient returns today for f/u from retropubic midurethral sling. Since her last visit she has done well. No KING. Happy with surgery    Ht 1.702 m (5' 7\")   Wt 119.3 kg (263 lb)   LMP 04/25/2022 (Exact Date)   BMI 41.19 kg/m    She is comfortable, in no distress, non-labored breathing.      A/P: 47 year old F with s/p TVT    F/U PRN    I spent a total of 10 minutes with  Ms. Lao  on the date of the encounter in chart review, face to face patient visit, review of tests, documentation and/or discussion with other providers about the issues documented above.    Hnay Thurston MD  Professor, OB/GYN  Urogynecologist    CC  Patient Care Team:  Yara Patel NP as PCP - General (Family Medicine)  Yara Patel NP as Assigned PCP  Hany Thurston MD as MD (OB/Gyn)  Hany Thurston MD as Assigned OBGYN Provider  Sofia Farley MD as MD (OB/Gyn)  Janeth Kay APRN CNP as Nurse Practitioner (Anesthesiology)  YARA PATEL    "

## 2022-06-09 ENCOUNTER — HOSPITAL ENCOUNTER (OUTPATIENT)
Dept: CARDIOLOGY | Facility: CLINIC | Age: 47
Discharge: HOME | End: 2022-06-09
Attending: NURSE PRACTITIONER | Admitting: NURSE PRACTITIONER
Payer: COMMERCIAL

## 2022-06-09 DIAGNOSIS — G47.34 NOCTURNAL OXYGEN DESATURATION: ICD-10-CM

## 2022-06-09 PROCEDURE — 93321 DOPPLER ECHO F-UP/LMTD STD: CPT | Mod: 26 | Performed by: INTERNAL MEDICINE

## 2022-06-09 PROCEDURE — 93018 CV STRESS TEST I&R ONLY: CPT | Performed by: INTERNAL MEDICINE

## 2022-06-09 PROCEDURE — 255N000002 HC RX 255 OP 636: Performed by: INTERNAL MEDICINE

## 2022-06-09 PROCEDURE — 999N000208 ECHO STRESS ECHOCARDIOGRAM

## 2022-06-09 PROCEDURE — 93017 CV STRESS TEST TRACING ONLY: CPT

## 2022-06-09 PROCEDURE — 93350 STRESS TTE ONLY: CPT | Mod: 26 | Performed by: INTERNAL MEDICINE

## 2022-06-09 PROCEDURE — 93016 CV STRESS TEST SUPVJ ONLY: CPT | Performed by: INTERNAL MEDICINE

## 2022-06-09 PROCEDURE — 93325 DOPPLER ECHO COLOR FLOW MAPG: CPT | Mod: 26 | Performed by: INTERNAL MEDICINE

## 2022-06-09 RX ADMIN — PERFLUTREN 5 ML: 6.52 INJECTION, SUSPENSION INTRAVENOUS at 15:22

## 2022-06-09 NOTE — RESULT ENCOUNTER NOTE
Dewey Cerda,    Thank you for your recent office visit.    Here are your recent results.  Good news!  Per cardiology, normal cardiac stress test.     Interpretation Summary  Normal exercise echocardiogram without evidence of inducible ischemia.  Target heart rate was achieved. Heart rate and blood pressure response to  exercise were normal. Normal LV function and wall motion at rest. With stress,  the left ventricular ejection fraction increased from 55-60% to greater than  65% and the left ventricular size decreased appropriately. No regional wall  motion abnormality with stress.  Normal functional capacity. No subjective symptoms to suggest ischemia.  There was no ECG evidence of ischemia.  No significant valve disease on screening doppler evaluation. The aortic root  and visualized ascending aorta are normal.    Feel free to contact me via [employer] or call the clinic at [phone].    Sincerely,    REBEKAH Orozco, FNP-BC

## 2022-06-09 NOTE — TELEPHONE ENCOUNTER
DME (Durable Medical Equipment) Orders and Documentation  Orders Placed This Encounter   Procedures     Apnea Monitor Order      The patient was assessed and it was determined the patient is in need of the following listed DME Supplies/Equipment. Please complete supporting documentation below to demonstrate medical necessity.      DME All Other Item(s) Documentation    List reason for need and supporting documentation for medical necessity below for each DME item.     1. nocturnal oxygen desaturation.  REBEKAH Orozco, FNP-BC

## 2022-06-26 NOTE — ANESTHESIA PROCEDURE NOTES
Other Procedure Note    Pre-Procedure   Staff -        Anesthesiologist:  Hitesh Chacon MD       Performed By: anesthesiologist       Location: post-op       Procedure Start/Stop Times: 5/17/2022 1:20 PM       Pre-Anesthestic Checklist: patient identified, IV checked, site marked, risks and benefits discussed, informed consent, monitors and equipment checked, pre-op evaluation, at physician/surgeon's request and post-op pain management  Timeout:       Correct Patient: Yes        Correct Procedure: Yes        Correct Site: Yes        Correct Position: Yes        Correct Laterality: Yes        Site Marked: Yes  Procedure Documentation  Procedure: Other       Diagnosis: ROBOTIC HYSTERECTOMY       Laterality: bilateral       Patient Position: supine       Skin prep: Chloraprep       Needle Type: short bevel       Needle Gauge: 21.        Needle Length (Inches): 4        Ultrasound guided       1. Ultrasound was used to identify targeted nerve, plexus, vascular marker, or fascial plane and place a needle adjacent to it in real-time.       2. Ultrasound was used to visualize the spread of anesthetic in close proximity to the above referenced structure.       3. A permanent image is entered into the patient's record.       4. The visualized anatomic structures appeared normal.    Assessment/Narrative         The placement was negative for: blood aspirated, painful injection and site bleeding       Paresthesias: No.       Bolus given via needle..        Secured via.        Insertion/Infusion Method: Single Shot       Complications: none    Medication(s) Administered   Bupivacaine 0.25% PF (Infiltration) - Infiltration   50 mL - 5/17/2022 1:35:00 PM  Dexmedetomidine 4 mcg/mL (Perineural) - Perineural   40 mcg - 5/17/2022 1:35:00 PM  Dexamethasone 10 mg/mL PF (Perineural) - Perineural   2 mg - 5/17/2022 1:35:00 PM  Medication Administration Time: 5/17/2022 1:20 PM     Comments:  Bilateral Quadratus Lumborum  block       [FreeTextEntry1] : PFT results:Mild restriction. Normal diffusion

## 2022-07-03 LAB — NONINV COLON CA DNA+OCC BLD SCRN STL QL: NEGATIVE

## 2022-07-05 NOTE — RESULT ENCOUNTER NOTE
Dewey Cerda,    Thank you for your recent office visit.    Here are your recent results.  Normal/ negative colon cancer screening.     Feel free to contact me via [employer] or call the clinic at [phone].    Sincerely,    REBEKAH Orozco, FNP-BC

## 2022-07-07 NOTE — PROGRESS NOTES
[facility] Service    Outpatient Physical Therapy Discharge Note  Patient: Zaida Lao  : 1975    Beginning/End Dates of Reporting Period:  22     Referring Provider: Dr. Hany Thurston    Therapy Diagnosis: stress incontinence     Client Self Report: see initial eval    Objective Measurements:  see initial eval.        Goals:  Goal Identifier     Goal Description patient will be able to consistently cough and sneeze without leaking urine   Target Date 21   Date Met      Progress (detail required for progress note):       Goal Identifier     Goal Description patient will no longer need to wear a pad to address incontinence    Target Date 21   Date Met      Progress (detail required for progress note):           Plan:  Discharge from therapy.    Discharge:    Reason for Discharge: Patient has failed to schedule further appointments.    Equipment Issued: none    Discharge Plan: Patient to continue home program.

## 2022-07-07 NOTE — ADDENDUM NOTE
Encounter addended by: Pati Magallanes, PT on: 7/7/2022 1:25 PM   Actions taken: Episode resolved, Clinical Note Signed, Flowsheet accepted

## 2022-07-25 ENCOUNTER — HOSPITAL ENCOUNTER (OUTPATIENT)
Dept: RESPIRATORY THERAPY | Facility: CLINIC | Age: 47
Discharge: HOME | End: 2022-07-25
Attending: NURSE PRACTITIONER | Admitting: NURSE PRACTITIONER
Payer: COMMERCIAL

## 2022-07-25 LAB — HGB BLD-MCNC: 14.1 G/DL (ref 11.7–15.7)

## 2022-07-25 PROCEDURE — 999N000157 HC STATISTIC RCP TIME EA 10 MIN

## 2022-07-25 PROCEDURE — 94060 EVALUATION OF WHEEZING: CPT | Mod: 26 | Performed by: INTERNAL MEDICINE

## 2022-07-25 PROCEDURE — 36415 COLL VENOUS BLD VENIPUNCTURE: CPT | Performed by: NURSE PRACTITIONER

## 2022-07-25 PROCEDURE — 94729 DIFFUSING CAPACITY: CPT | Mod: 26 | Performed by: INTERNAL MEDICINE

## 2022-07-25 PROCEDURE — 94060 EVALUATION OF WHEEZING: CPT

## 2022-07-25 PROCEDURE — 94726 PLETHYSMOGRAPHY LUNG VOLUMES: CPT | Mod: 26 | Performed by: INTERNAL MEDICINE

## 2022-07-25 PROCEDURE — 250N000009 HC RX 250: Performed by: NURSE PRACTITIONER

## 2022-07-25 PROCEDURE — 94726 PLETHYSMOGRAPHY LUNG VOLUMES: CPT

## 2022-07-25 PROCEDURE — 94729 DIFFUSING CAPACITY: CPT

## 2022-07-25 PROCEDURE — 85018 HEMOGLOBIN: CPT | Performed by: NURSE PRACTITIONER

## 2022-07-25 RX ORDER — ALBUTEROL SULFATE 0.83 MG/ML
2.5 SOLUTION RESPIRATORY (INHALATION) ONCE
Status: COMPLETED | OUTPATIENT
Start: 2022-07-25 | End: 2022-07-25

## 2022-07-25 RX ADMIN — ALBUTEROL SULFATE 2.5 MG: 2.5 SOLUTION RESPIRATORY (INHALATION) at 13:11

## 2022-07-25 NOTE — PROGRESS NOTES
RESPIRATORY CARE NOTE    Complete Pulmonary Function Test completed by patient.  Good patient effort and cooperation. Albuterol 2.5 mg neb given for bronchodilation.  The results of this test meet the ATS standards for acceptability and repeatability. PT returned to baseline and left in no distress.    Rhonda Santos, RT  7/25/2022

## 2022-08-08 ENCOUNTER — OFFICE VISIT (OUTPATIENT)
Dept: PULMONOLOGY | Facility: OTHER | Age: 47
End: 2022-08-08
Attending: NURSE PRACTITIONER
Payer: COMMERCIAL

## 2022-08-08 VITALS
BODY MASS INDEX: 40.65 KG/M2 | OXYGEN SATURATION: 98 % | HEIGHT: 67 IN | HEART RATE: 92 BPM | DIASTOLIC BLOOD PRESSURE: 80 MMHG | WEIGHT: 259 LBS | SYSTOLIC BLOOD PRESSURE: 134 MMHG

## 2022-08-08 DIAGNOSIS — G47.34 NOCTURNAL OXYGEN DESATURATION: ICD-10-CM

## 2022-08-08 PROCEDURE — 99204 OFFICE O/P NEW MOD 45 MIN: CPT | Performed by: INTERNAL MEDICINE

## 2022-08-08 NOTE — PATIENT INSTRUCTIONS
It was a pleasure to see you today.    I have highest suspicion for un-diagnosed sleep apnea.  Make sure to keep your sleep appointment next month.  You do not need to do the overnight oximetry    Lung testing is normal, and unlikely that you have a lung problem causing this.     If sleep testing is normal (ie, negative for sleep apnea, but oxygen levels are low with sleep) - I would like to see you again for further discussion.    Please call back to make an appointment if that's the case.       Mary Kay Solitario MD  Pulmonary and Critical Care Medicine  [facility]  Office: [phone]

## 2022-08-08 NOTE — PROGRESS NOTES
[facility] Clinic Outpatient Consultation    Assessment and Plan:   47 y F with a history of HTN, depression, obesity (BMI 40), who presents for an evaluation of nocturnal hypoxemia to 80s that was observed inpatient after a hysterectomy in May.    Her story as outlined and stop bang score of 4-5 are most supportive of a diagnosis of untreated sleep apnea. She was diagnosed with this in 2019 and prescribed CPAP which she did not tolerate. And is no longer using. Her lung testing is normal.       Recommendations:    Sleep appointment as scheduled and sleep study       She will let me know if her sleep testing is unremarkable/negative for sleep apnea, in that case we can consider the much less likely clinical alternatives. Otherwise follow up prn.      Mary Kay Solitario MD  Pulmonary and Critical Care Medicine  [facility]  Office: [phone]    ----------------------    Reason for Consult: Observed nocturnal hypoxemia     HPI:   47 y F with a history of HTN, depression, obesity (BMI 40), who presents for an evaluation of nocturnal hypoxemia to 80s that was observed inpatient after a hysterectomy in May.    She otherwise has no clinical respiratory complaints. No known lung disease. Feels very tired at baseline which she attributes to busy work, home life. She does snore. Brief history of tobacco abuse, some time ago.     The following work up was ordered:  CXR - normal, symmetric diaphragms  Stress Echo - normal  LOU - not yet done   Sleep referral, appt next month    2019 PSG diagnosed LOC and was prescribed CPAP. Did not tolerate/find it comfortable. Saw ENT and had tonsils, adenoids, uvuloplasty in March 2020      ROS:  A 12-system review was obtained and was negative with the exception of the symptoms endorsed in the history of present illness.    PMH:  Past Medical History:   Diagnosis Date     Cervical high risk HPV (human papillomavirus) test positive 11/13/2020     Depressive disorder 2000    I take  Lexapro     HTN (hypertension)      Human papillomavirus in conditions classified elsewhere and of unspecified site     genital warts , resolved     Mild or unspecified pre-eclampsia, antepartum      LOC (obstructive sleep apnea)      Pre-eclampsia      Seasonal allergies     Zyrtec helps and has an Rx for Flonase     PSH:  Past Surgical History:   Procedure Laterality Date      SECTION  2011    Procedure: SECTION; Surgeon:CHRISSY GAFFNEY; Location:[facility] L[facility]      SECTION  2014    Procedure:  SECTION;  Surgeon: Chrissy Gaffney MD;  Location: [facility] L[facility]     CYSTOSCOPY, SLING TRANSVAGINAL N/A 2022    Procedure: CREATION, VAGINAL SLING, WITH CYSTOSCOPY;  Surgeon: Hany Thurston MD;  Location: [facility] OR     DAVINCI HYSTERECTOMY TOTAL, SALPINGECTOMY BILATERAL N/A 2022    Procedure: HYSTERECTOMY, TOTAL, ROBOT-ASSISTED, WITH BILATERAL SALPINGECTOMY, CYSTOSCOPY;  Surgeon: Shahida Coyne MD;  Location: [facility] OR     GYN SURGERY      tubal ligation     HC TOOTH EXTRACTION W/FORCEP      one dry socket     RW DENTAL (ABSTRACTED)       SEPTOPLASTY, TURBINOPLASTY, COMBINED Bilateral 3/9/2020    Procedure: SEPTOPLASTY, BILATERAL INFERIOR TURBINATE REDUCTION;  Surgeon: Jb Nava MD;  Location: [facility] OR     SURGICAL HISTORY OF -       lithotripsy     TONSILLECTOMY, ADENOIDECTOMY, COMBINED Bilateral 3/9/2020    Procedure: BILATERAL TONSILLECTOMY AND ADENOIDECTOMY;  Surgeon: Jb Nava MD;  Location: [facility] OR     Allergies:  Allergies   Allergen Reactions     Seasonal Allergies      Cold symptoms, stuffy nose, itchy throat and eyes     Family HX:  Family History   Problem Relation Age of Onset     C.A.D. Paternal Grandfather          of MI     Breast Cancer Maternal Grandmother              Cancer - colorectal Maternal Grandmother      Circulatory Maternal Grandmother         aneurism     Prostate Cancer Maternal Grandfather               Eye Surgery Maternal Grandfather         cataracts, late in life     Eye Disorder Father         detached retina     Genitourinary Problems Father         kidney stones     Macular Degeneration Father      Hypertension Father      Nephrolithiasis Father      Cerebrovascular Disease Paternal Grandmother              Glaucoma No family hx of      Social Hx:  Social History     Socioeconomic History     Marital status:      Spouse name: Not on file     Number of children: Not on file     Years of education: Not on file     Highest education level: Not on file   Occupational History     Occupation: student     Employer: [employer]     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   Tobacco Use     Smoking status: Former Smoker     Packs/day: 0.00     Years: 10.00     Pack years: 0.00     Types: Cigarettes     Start date: 1994     Quit date: 2005     Years since quittin.5     Smokeless tobacco: Never Used     Tobacco comment: Social smoker   Vaping Use     Vaping Use: Never used   Substance and Sexual Activity     Alcohol use: Yes     Comment: Socially     Drug use: Yes     Types: Marijuana     Comment: Gummies 2-3 per month     Sexual activity: Yes     Partners: Male     Birth control/protection: Female Surgical     Comment: Tubal ligation post c section    Other Topics Concern     Parent/sibling w/ CABG, MI or angioplasty before 65F 55M? No   Social History Narrative    Caffeine intake/servings daily - 0    Calcium intake/servings daily - 3    Exercise 5 times weekly - describe gym    Sunscreen used - Yes    Seatbelts used - Yes    Guns stored in the home - No    Self Breast Exam - Yes    Pap test up to date -  Yes    Eye exam up to date -  Yes    Dental exam up to date -  Yes    DEXA scan up to date -  No    Flex Sig/Colonoscopy up to date -  No    Mammography up to date -  No    Immunizations reviewed and up to date - Yes    Abuse: Current or Past (Physical, Sexual or Emotional) - No     "Do you feel safe in your environment - Yes    Do you cope well with stress - Yes    Do you suffer from insomnia - No    Last updated by: Radha Mendoza  12/18/2013                             Social Determinants of Health     Financial Resource Strain: Not on file   Food Insecurity: Not on file   Transportation Needs: Not on file   Physical Activity: Not on file   Stress: Not on file   Social Connections: Not on file   Intimate Partner Violence: Not on file   Housing Stability: Not on file     Current Meds:  Reviewed    Physical Exam:  /80 (BP Location: Right arm, Patient Position: Chair, Cuff Size: Adult Large)   Pulse 92   Ht 1.702 m (5' 7\")   Wt 117.5 kg (259 lb)   LMP 04/25/2022 (Exact Date)   SpO2 98%   BMI 40.57 kg/m    Gen: Alert, oriented, no distress  HEENT: nasal turbinates are unremarkable, no oropharyngeal lesions, no cervical or supraclavicular lymphadenopathy  CV: RRR, no M/G/R  Resp: CTAB/L, no focal crackles or wheezes  Abd: soft, nontender, no palpable organomegaly  Skin: no apparent rashes  Ext: Warm, no edema  Neuro: alert, nonfocal    Labs:  Reviewed    Imaging studies:  Personally reviewed and interpreted:    6/2/22 CXR:  FINDINGS: Heart size and pulmonary vascularity are within normal  limits. The lungs are clear. No pneumothorax or pleural effusion.                                                             IMPRESSION: Normal two views of the chest.     PFT's   Mild air trapping with %  Otherwise completely normal          "

## 2022-09-20 ASSESSMENT — SLEEP AND FATIGUE QUESTIONNAIRES
HOW LIKELY ARE YOU TO NOD OFF OR FALL ASLEEP IN A CAR, WHILE STOPPED FOR A FEW MINUTES IN TRAFFIC: WOULD NEVER DOZE
HOW LIKELY ARE YOU TO NOD OFF OR FALL ASLEEP WHEN YOU ARE A PASSENGER IN A CAR FOR AN HOUR WITHOUT A BREAK: MODERATE CHANCE OF DOZING
HOW LIKELY ARE YOU TO NOD OFF OR FALL ASLEEP WHILE SITTING AND TALKING TO SOMEONE: WOULD NEVER DOZE
HOW LIKELY ARE YOU TO NOD OFF OR FALL ASLEEP WHILE SITTING QUIETLY AFTER LUNCH WITHOUT ALCOHOL: SLIGHT CHANCE OF DOZING
HOW LIKELY ARE YOU TO NOD OFF OR FALL ASLEEP WHILE SITTING AND READING: MODERATE CHANCE OF DOZING
HOW LIKELY ARE YOU TO NOD OFF OR FALL ASLEEP WHILE SITTING INACTIVE IN A PUBLIC PLACE: MODERATE CHANCE OF DOZING
HOW LIKELY ARE YOU TO NOD OFF OR FALL ASLEEP WHILE LYING DOWN TO REST IN THE AFTERNOON WHEN CIRCUMSTANCES PERMIT: MODERATE CHANCE OF DOZING
HOW LIKELY ARE YOU TO NOD OFF OR FALL ASLEEP WHILE WATCHING TV: MODERATE CHANCE OF DOZING

## 2022-09-23 ENCOUNTER — PATIENT OUTREACH (OUTPATIENT)
Dept: FAMILY MEDICINE | Facility: CLINIC | Age: 47
End: 2022-09-23

## 2022-09-23 ENCOUNTER — VIRTUAL VISIT (OUTPATIENT)
Dept: SLEEP MEDICINE | Facility: CLINIC | Age: 47
End: 2022-09-23
Attending: NURSE PRACTITIONER
Payer: COMMERCIAL

## 2022-09-23 VITALS — WEIGHT: 260 LBS | HEIGHT: 67 IN | BODY MASS INDEX: 40.81 KG/M2

## 2022-09-23 DIAGNOSIS — G47.9 SLEEP DISTURBANCE: Primary | ICD-10-CM

## 2022-09-23 DIAGNOSIS — G47.34 NOCTURNAL OXYGEN DESATURATION: ICD-10-CM

## 2022-09-23 DIAGNOSIS — G47.19 EXCESSIVE DAYTIME SLEEPINESS: ICD-10-CM

## 2022-09-23 DIAGNOSIS — E66.01 MORBID OBESITY (H): ICD-10-CM

## 2022-09-23 DIAGNOSIS — R87.810 CERVICAL HIGH RISK HPV (HUMAN PAPILLOMAVIRUS) TEST POSITIVE: ICD-10-CM

## 2022-09-23 DIAGNOSIS — R06.83 SNORING: ICD-10-CM

## 2022-09-23 PROCEDURE — 99205 OFFICE O/P NEW HI 60 MIN: CPT | Mod: GT | Performed by: INTERNAL MEDICINE

## 2022-09-23 NOTE — PATIENT INSTRUCTIONS
"      MY TREATMENT INFORMATION FOR SLEEP APNEA-  Zaida Lao    DOCTOR : Jocelin Rios MD    Am I having a sleep study at a sleep center?  --->Due to normal delays, you will be contacted within 2-4 weeks to schedule      Frequently asked questions:  1. What is Obstructive Sleep Apnea (LOC)? LOC is the most common type of sleep apnea. Apnea means, \"without breath.\"  Apnea is most often caused by narrowing or collapse of the upper airway as muscles relax during sleep.   Almost everyone has occasional apneas. Most people with sleep apnea have had brief interruptions at night frequently for many years.  The severity of sleep apnea is related to how frequent and severe the events are.   2. What are the consequences of LOC? Symptoms include: feeling sleepy during the day, snoring loudly, gasping or stopping of breathing, trouble sleeping, and occasionally morning headaches or heartburn at night.  Sleepiness can be serious and even increase the risk of falling asleep while driving. Other health consequences may include development of high blood pressure and other cardiovascular disease in persons who are susceptible. Untreated OLC  can contribute to heart disease, stroke and diabetes.   3. What are the treatment options? In most situations, sleep apnea is a lifelong disease that must be managed with daily therapy. Medications are not effective for sleep apnea and surgery is generally not considered until other therapies have been tried. Your treatment is your choice . Continuous Positive Airway (CPAP) works right away and is the therapy that is effective in nearly everyone. An oral device to hold your jaw forward is usually the next most reliable option. Other options include postioning devices (to keep you off your back), weight loss, and surgery including a tongue pacing device. There is more detail about some of these options below.  4. Are my sleep studies covered by insurance? Although we " will request verification of coverage, we advise you also check in advance of the study to ensure there is coverage.    Important tips for those choosing CPAP and similar devices   Know your equipment:  CPAP is continuous positive airway pressure that prevents obstructive sleep apnea by keeping the throat from collapsing while you are sleeping. In most cases, the device is  smart  and can slowly self-adjusts if your throat collapses and keeps a record every day of how well you are treated-this information is available to you and your care team.  BPAP is bilevel positive airway pressure that keeps your throat open and also assists each breath with a pressure boost to maintain adequate breathing.  Special kinds of BPAP are used in patients who have inadequate breathing from lung or heart disease. In most cases, the device is  smart  and can slowly self-adjusts to assist breathing. Like CPAP, the device keeps a record of how well you are treated.  Your mask is your connection to the device. You get to choose what feels most comfortable and the staff will help to make sure if fits. Here: are some examples of the different masks that are available:       Key points to remember on your journey with sleep apnea:  Sleep study.  PAP devices often need to be adjusted during a sleep study to show that they are effective and adjusted right.  Good tips to remember: Try wearing just the mask during a quiet time during the day so your body adapts to wearing it. A humidifier is recommended for comfort in most cases to prevent drying of your nose and throat. Allergy medication from your provider may help you if you are having nasal congestion.  Getting settled-in. It takes more than one night for most of us to get used to wearing a mask. Try wearing just the mask during a quiet time during the day so your body adapts to wearing it. A humidifier is recommended for comfort in most cases. Our team will work with you carefully on the  first day and will be in contact within 4 days and again at 2 and 4 weeks for advice and remote device adjustments. Your therapy is evaluated by the device each day.   Use it every night. The more you are able to sleep naturally for 7-8 hours, the more likely you will have good sleep and to prevent health risks or symptoms from sleep apnea. Even if you use it 4 hours it helps. Occasionally all of us are unable to use a medical therapy, in sleep apnea, it is not dangerous to miss one night.   Communicate. Call our skilled team on the number provided on the first day if your visit for problems that make it difficult to wear the device. Over 2 out of 3 patients can learn to wear the device long-term with help from our team. Remember to call our team or your sleep providers if you are unable to wear the device as we may have other solutions for those who cannot adapt to mask CPAP therapy. It is recommended that you sleep your sleep provider within the first 3 months and yearly after that if you are not having problems.   Use it for your health. We encourage use of CPAP masks during daytime quiet periods to allow your face and brain to adapt to the sensation of CPAP so that it will be a more natural sensation to awaken to at night or during naps. This can be very useful during the first few weeks or months of adapting to CPAP though it does not help medically to wear CPAP during wakefulness and  should not be used as a strategy just to meet guidelines.  Take care of your equipment. Make sure you clean your mask and tubing using directions every day and that your filter and mask are replaced as recommended or if they are not working.     BESIDES CPAP, WHAT OTHER THERAPIES ARE THERE?    Positioning Device  Positioning devices are generally used when sleep apnea is mild and only occurs on your back.This example shows a pillow that straps around the waist. It may be appropriate for those whose sleep study shows milder sleep  apnea that occurs primarily when lying flat on one's back. Preliminary studies have shown benefit but effectiveness at home may need to be verified by a home sleep test. These devices are generally not covered by medical insurance.  Examples of devices that maintain sleeping on the back to prevent snoring and mild sleep apnea.    Belt type body positioner  http://[employer]/    Electronic reminder  http://nightshifttherapy.com/            Oral Appliance  What is oral appliance therapy?  An oral appliance device fits on your teeth at night like a retainer used after having braces. The device is made by a specialized dentist and requires several visits over 1-2 months before a manufactured device is made to fit your teeth and is adjusted to prevent your sleep apnea. Once an oral device is working properly, snoring should be improved. A home sleep test may be recommended at that time if to determine whether the sleep apnea is adequately treated.       Some things to remember:  -Oral devices are often, but not always, covered by your medical insurance. Be sure to check with your insurance provider.   -If you are referred for oral therapy, you will be given a list of specialized dentists to consider or you may choose to visit the Web site of the American Academy of Dental Sleep Medicine  -Oral devices are less likely to work if you have severe sleep apnea or are extremely overweight.     More detailed information  An oral appliance is a small acrylic device that fits over the upper and lower teeth  (similar to a retainer or a mouth guard). This device slightly moves jaw forward, which moves the base of the tongue forward, opens the airway, improves breathing for effective treat snoring and obstructive sleep apnea in perhaps 7 out of 10 people .  The best working devices are custom-made by a dental device  after a mold is made of the teeth 1, 2, 3.  When is an oral appliance indicated?  Oral appliance  therapy is recommended as a first-line treatment for patients with primary snoring, mild sleep apnea, and for patients with moderate sleep apnea who prefer appliance therapy to use of CPAP4, 5. Severity of sleep apnea is determined by sleep testing and is based on the number of respiratory events per hour of sleep.   How successful is oral appliance therapy?  The success rate of oral appliance therapy in patients with mild sleep apnea is 75-80% while in patients with moderate sleep apnea it is 50-70%. The chance of success in patients with severe sleep apnea is 40-50%. The research also shows that oral appliances have a beneficial effect on the cardiovascular health of LOC patients at the same magnitude as CPAP therapy7.  Oral appliances should be a second-line treatment in cases of severe sleep apnea, but if not completely successful then a combination therapy utilizing CPAP plus oral appliance therapy may be effective. Oral appliances tend to be effective in a broad range of patients although studies show that the patients who have the highest success are females, younger patients, those with milder disease, and less severe obesity. 3, 6.   Finding a dentist that practices dental sleep medicine  Specific training is available through the American Academy of Dental Sleep Medicine for dentists interested in working in the field of sleep. To find a dentist who is educated in the field of sleep and the use of oral appliances, near you, visit the Web site of the American Academy of Dental Sleep Medicine.    References  1. Flakito et al. Objectively measured vs self-reported compliance during oral appliance therapy for sleep-disordered breathing. Chest 2013; 144(5): 3476-5774.  2. Ana Maria et al. Objective measurement of compliance during oral appliance therapy for sleep-disordered breathing. Thorax 2013; 68(1): 91-96.  3. Arias et al. Mandibular advancement devices in 620 men and women with LOC and snoring:  tolerability and predictors of treatment success. Chest 2004; 125: 1734-5354.  4. Nohemi et al. Oral appliances for snoring and LOC: a review. Sleep 2006; 29: 244-262.  5. Irma et al. Oral appliance treatment for LOC: an update. J Clin Sleep Med 2014; 10(2): 215-227.  6. Edward et al. Predictors of OSAH treatment outcome. J Dent Res 2007; 86: 7427-0018.      Weight Loss:    Weight loss is a long-term strategy that may improve sleep apnea in some patients.    Weight management is a personal decision and the decision should be based on your interest and the potential benefits.  If you are interested in exploring weight loss strategies, the following discussion covers the impact on weight loss on sleep apnea and the approaches that may be successful.    Being overweight does not necessarily mean you will have health consequences.  Those who have BMI over 35 or over 27 with existing medical conditions carries greater risk.   Weight loss decreases severity of sleep apnea in most people with obesity. For those with mild obesity who have developed snoring with weight gain, even 15-30 pound weight loss can improve and occasionally eliminate sleep apnea.  Structured and life-long dietary and health habits are necessary to lose weight and keep healthier weight levels.     Though there may be significant health benefits from weight loss, long-term weight loss is very difficult to achieve- studies show success with dietary management in less than 10% of people. In addition, substantial weight loss may require years of dietary control and may be difficult if patients have severe obesity. In these cases, surgical management may be considered.  Finally, older individuals who have tolerated obesity without health complications may be less likely to benefit from weight loss strategies.      Surgery:    Surgery for obstructive sleep apnea is considered generally only when other therapies fail to work. Surgery may be  discussed with you if you are having a difficult time tolerating CPAP and or when there is an abnormal structure that requires surgical correction.  Nose and throat surgeries often enlarge the airway to prevent collapse.  Most of these surgeries create pain for 1-2 weeks and up to half of the most common surgeries are not effective throughout life.  You should carefully discuss the benefits and drawbacks to surgery with your sleep provider and surgeon to determine if it is the best solution for you.   More information  Surgery for LOC is directed at areas that are responsible for narrowing or complete obstruction of the airway during sleep.  There are a wide range of procedures available to enlarge and/or stabilize the airway to prevent blockage of breathing in the three major areas where it can occur: the palate, tongue, and nasal regions.  Successful surgical treatment depends on the accurate identification of the factors responsible for obstructive sleep apnea in each person.  A personalized approach is required because there is no single treatment that works well for everyone.  Because of anatomic variation, consultation with an examination by a sleep surgeon is a critical first step in determining what surgical options are best for each patient.  In some cases, examination during sedation may be recommended in order to guide the selection of procedures.  Patients will be counseled about risks and benefits as well as the typical recovery course after surgery. Surgery is typically not a cure for a person s LOC.  However, surgery will often significantly improve one s LOC severity (termed  success rate ).  Even in the absence of a cure, surgery will decrease the cardiovascular risk associated with OSA7; improve overall quality of life8 (sleepiness, functionality, sleep quality, etc).      Palate Procedures:  Patients with LOC often have narrowing of their airway in the region of their tonsils and uvula.  The goals  of palate procedures are to widen the airway in this region as well as to help the tissues resist collapse.  Modern palate procedure techniques focus on tissue conservation and soft tissue rearrangement, rather than tissue removal.  Often the uvula is preserved in this procedure. Residual sleep apnea is common in patient after pharyngoplasty with an average reduction in sleep apnea events of 33%2.      Tongue Procedures:  ExamWhile patients are awake, the muscles that surround the throat are active and keep this region open for breathing. These muscles relax during sleep, allowing the tongue and other structures to collapse and block breathing.  There are several different tongue procedures available.  Selection of a tongue base procedure depends on characteristics seen on physical exam.  Generally, procedures are aimed at removing bulky tissues in this area or preventing the back of the tongue from falling back during sleep.  Success rates for tongue surgery range from 50-62%3.    Hypoglossal Nerve Stimulation:  Hypoglossal nerve stimulation has recently received approval from the [place] Food and Drug Administration for the treatment of obstructive sleep apnea.  This is based on research showing that the system was safe and effective in treating sleep apnea6.  Results showed that the median AHI score decreased 68%, from 29.3 to 9.0. This therapy uses an implant system that senses breathing patterns and delivers mild stimulation to airway muscles, which keeps the airway open during sleep.  The system consists of three fully implanted components: a small generator (similar in size to a pacemaker), a breathing sensor, and a stimulation lead.  Using a small handheld remote, a patient turns the therapy on before bed and off upon awakening.    Candidates for this device must be greater than 22 years of age, have moderate to severe LOC (AHI between 20-65), BMI less than 32, have tried CPAP/oral appliance without  success, and have appropriate upper airway anatomy (determined by a sleep endoscopy performed by Dr. Tolbert).    Hypoglossal Nerve Stimulation Pathway:    The sleep surgeon s office will work with the patient through the insurance prior-authorization process (including communications and appeals).    Nasal Procedures:  Nasal obstruction can interfere with nasal breathing during the day and night.  Studies have shown that relief of nasal obstruction can improve the ability of some patients to tolerate positive airway pressure therapy for obstructive sleep apnea1.  Treatment options include medications such as nasal saline, topical corticosteroid and antihistamine sprays, and oral medications such as antihistamines or decongestants. Non-surgical treatments can include external nasal dilators for selected patients. If these are not successful by themselves, surgery can improve the nasal airway either alone or in combination with these other options.      Combination Procedures:  Combination of surgical procedures and other treatments may be recommended, particularly if patients have more than one area of narrowing or persistent positional disease.  The success rate of combination surgery ranges from 66-80%2,3.    References  Cathy STEPHENS. The Role of the Nose in Snoring and Obstructive Sleep Apnoea: An Update.  Eur Arch Otorhinolaryngol. 2011; 268: 1365-73.   Usama SM; Donald JA; Safia JR; Pallanch JF; Virginia MB; Stefan SG; Maksim GONZALEZD. Surgical modifications of the upper airway for obstructive sleep apnea in adults: a systematic review and meta-analysis. SLEEP 2010;33(10):8989-4341. Indu GUTIERREZ. Hypopharyngeal surgery in obstructive sleep apnea: an evidence-based medicine review.  Arch Otolaryngol Head Neck Surg. 2006 Feb;132(2):206-13.  Apolinar YH1, Nick Y, Ludin CHENCHO. The efficacy of anatomically based multilevel surgery for obstructive sleep apnea. Otolaryngol Head Neck Surg. 2003 Oct;129(4):327-35.  Kezirian E, Goldberg  JAMES. Hypopharyngeal Surgery in Obstructive Sleep Apnea: An Evidence-Based Medicine Review. Arch Otolaryngol Head Neck Surg. 2006 Feb;132(2):206-13.  Ken MONTERO et al. Upper-Airway Stimulation for Obstructive Sleep Apnea.  N Engl J Med. 2014 Jan 9;370(2):139-49.  Buster Y et al. Increased Incidence of Cardiovascular Disease in Middle-aged Men with Obstructive Sleep Apnea. Am J Respir Crit Care Med; 2002 166: 159-165  Chavira EM et al. Studying Life Effects and Effectiveness of Palatopharyngoplasty (SLEEP) study: Subjective Outcomes of Isolated Uvulopalatopharyngoplasty. Otolaryngol Head Neck Surg. 2011; 144: 623-631.        WHAT IF I ONLY HAVE SNORING?    Mandibular advancement devices, lateral sleep positioning, long-term weight loss and treatment of nasal allergies have been shown to improve snoring.  Exercising tongue muscles with a game ([employer]://apps.[employer]/us/john/soundly-reduce-snoring/op9483848976) or stimulating the tongue during the day with a device (https://doi.org/10.3390/xdv22410988) have improved snoring in some individuals.    Remember to Drive Safe... Drive Alive     Sleep health profoundly affects your health, mood, and your safety.  Thirty three percent of the population (one in three of us) is not getting enough sleep and many have a sleep disorder. Not getting enough sleep or having an untreated / undertreated sleep condition may make us sleepy without even knowing it. In fact, our driving could be dramatically impaired due to our sleep health. As your provider, here are some things I would like you to know about driving:     Here are some warning signs for impairment and dangerous drowsy driving:              -Having been awake more than 16 hours               -Looking tired               -Eyelid drooping              -Head nodding (it could be too late at this point)              -Driving for more than 30 minutes     Some things you could do to make the driving safer if you are experiencing some  drowsiness:              -Stop driving and rest              -Call for transportation              -Make sure your sleep disorder is adequately treated     Some things that have been shown NOT to work when experiencing drowsiness while driving:              -Turning on the radio              -Opening windows              -Eating any  distracting  /  entertaining  foods (e.g., sunflower seeds, candy, or any other)              -Talking on the phone      Your decision may not only impact your life, but also the life of others. Please, remember to drive safe for yourself and all of us.

## 2022-09-23 NOTE — PROGRESS NOTES
Zaida is a 47 year old who is being evaluated via a billable video visit.      How would you like to obtain your AVS? MyChart  If the video visit is dropped, the invitation should be resent by: Text to cell phone: [phone]  Will anyone else be joining your video visit? Tresa Parsons        Video-Visit Details    Video Start Time: 2 pm  Type of service:  Video Visit    Video End Time:235pm  Originating Location (pt. Location): Home    Distant Location (provider location):  [facility]     Platform used for Video Visit: [facility]       Outpatient Sleep Medicine Consultation:      Name: Zaida Lao MRN# [id number]   Age: 47 year old YOB: 1975     Date of Consultation: September 23, 2022  Consultation is requested by: Yara Salcedo NP  [id number] [facility]  [place] [id number] Yara Salcedo  Primary care provider: Yara Salcedo       Reason for Sleep Consult:     Zaida Lao is sent by Yara Salcedo for a sleep consultation regarding obtaining evaluation for sleep-related breathing disorder    Patient s Reason for visit  Zaida Lao main reason for visit: Low oxygen levels when sleeping  Patient states problem(s) started: April 2019  Zaida Lao's goals for this visit: Determine if i should be using. C PAP           Assessment and Plan:     Zaida Lao is a very pleasant 47-year-old female with previously diagnosed LOC, with history of CPAP intolerance, status post tonsillectomy and adenoidectomy with septoplasty and turbinectomy, in the setting of morbid obesity, hypertension, anxiety, and adjustment disorder with anxious and depressed mood, seasonal allergies, with reports of snoring, nonrestorative sleep and fatigue.  Her symptoms are suggestive of persistent LOC.  Suspect possible coexistent obesity related hypoventilation, based on the body mass index of 40.72 kg/m2 and the recently checked serum bicarbonate  "level being greater than 27 mmol at 30 mmol/L.  Recommended obtaining diagnostic PSG which will include transcutaneous carbon dioxide monitoring to evaluate for sleep-related breathing disorder.  Consequences of untreated sleep apnea were discussed.  Information was provided regarding the various treatment options available for LOC as after visit summary.  Plan to communicate with the patient the results of the PSG via MyChart.    Insomnia-multifactorial-delayed sleep phase, psychophysiological, inadequate sleep hygiene.  --We discussed stimulus control measures.  --Recommended to  follow a regular sleep schedule,optimizing the duration and circadian timing of sleep and aim at obtaining at least 7 to 8 hours per night and avoid sleep deprivation.  We discussed minimizing exposure to bright light and avoiding mind stimulating activities before bed. Recommended exposure to bright light for 30-60 minutes soon after awakening in the morning using a bright light box of 10,000 Lux intensity.  --We discussed optimizing sleep hygiene including avoiding using electronics in bed and avoiding naps during the day.    Obesity: We discussed weight management with diet and exercise.    Patient was strongly advised to avoid driving, operating any heavy machinery or other hazardous situations while drowsy or sleepy.  Patient was counseled on the importance of driving while alert, to pull over if drowsy, or nap before getting into the vehicle if sleepy.     Summary Counseling:    Sleep Testing Reviewed  Obstructive Sleep Apnea Reviewed  Complications of Untreated Sleep Apnea Reviewed    Medical Decision-making:   Educational materials provided in instruction.    CC: Yara Salcedo    The above note was dictated using voice recognition software. Although reviewed after completion, some word and grammatical error may remain . Please contact the author for any clarifications.    \"I spent a total of 60 minutes with Zaida BARRERA " "[place] during today's video visit. Most of this time was spent counseling the patient and  coordinating care regarding  LOC, HST/PSG, consequences of untreated sleep apnea, CPAP treatment, reviewing reports of previous sleep studies, reviewing PFTs , insomnia, delayed sleep phase, sleep hygiene , weight management  and chart review., including documentation and further activities as noted above.\"     MD HOLLY Licea [facility]   Floor 1, [address]   [address] [place], [place] [address]. [place] [address]   Appointments: [phone]           History of Present Illness:   Zaida Lao is a very pleasant 47-year-old female who was previously diagnosed LOC who presents to sleep clinic for a video visit today to obtain reevaluation for the sleep-related breathing disorder.    She was initially diagnosed with mild LOC during the HST obtained on March 21, 2019. She underwent all-night titration study through [facility] on March 31, 2019.  It was noted that CPAP at 8 cmH2O was effective in controlling the LOC .    Past Sleep Evaluations:  #1  HOME SLEEP STUDY 3/21/2019  AHI: 10.6/hr  Sleep associated hypoxemia was present.    #2  Polysomnography March 31, 2019 through [facility](all-night titration study)  Weight at the time of the study: 251 pounds  All sleep stages were seen.  CPAP at 8 cm water was effective in controlling sleep apnea with a residual AHI at 0.4 events per hour.    She was prescribed CPAP therapy.  She used the CPAP for a few months but always felt claustrophobic so she discontinued the treatment  Subsequently she was evaluated by ENT provider. She underwent tonsillectomy, adenoidectomy, septoplasty and turbinectomy in March 2021   Since the surgery snoring has somewhat improved but has not resolved.  Her weight at the time of the sleep study in March 2019 was 251 pounds and her current weight is around  260 lbs.  In " May 2022 when she underwent hysterectomy, she took a lot of time to recover from anesthesia and was hospitalized for 2 midnights for observation and was on oxygen supplementation during the hospitalization.    Breathing/Snoring  Patient snores:Yes and she is a mouth breather  Other people complain about her snoring: Yes  Patient has been told she stops breathing in her sleep:No   Gasping/choking:No  She rarely wakes up from coughing/acid reflux  She has issues with the following: Morning headaches;Morning mouth dryness;Stuffy nose when you wake up      SLEEP-WAKE SCHEDULE:     Work/School Days: Patient goes to school/work: Yes   Usually gets into bed at 10:30  Takes patient about 2 mins to 1 hour to fall asleep (sit in bed scroll on phone, she may not be ready to sleep always)  Active mind sometimes affects sleep.  Has trouble falling asleep 2 nights per week  Wakes up in the middle of the night 1 or less times.  Wakes up due to Snorting self awake;External stimuli (bed partner, pets, noise, etc)  She has trouble falling back asleep Less than 1 times a week.   It usually takes Less than 5 minutes to get back to sleep  Patient is usually up at 6:15 a.m.  Uses alarm: Yes    Weekends/Non-work Days/All Other Days:  Usually gets into bed at 10-12   Takes patient about 2-45 minutes to fall asleep  Patient is usually up at 7:30 a.m.  Uses alarm: No    She does not always feel rested upon awakening. Sometimes fatigue, denies EDS, though she endorses an [place] sleepiness score of 11 out of 24.    Sleep Need  Patient gets  7 hours sleep on average   Patient thinks she needs about 8-9 hourS sleep    Zaida Lao prefers to sleep in this position(s): Side   Patient states they do the following activities in bed: Read;Watch TV;Use phone, computer, or tablet    Naps  Patient takes a purposeful nap 1-2 times a week and naps are usually 30 minutes in duration  She feels better after a nap: Yes  She dozes off unintentionally 2 x  week days per week  Patient has had a driving accident or near-miss due to sleepiness/drowsiness: No      SLEEP DISRUPTIONS:  Movement:   She denies symptoms of RLS     Behaviors in Sleep:  Zaida Lao has experienced the following behaviors while sleeping: Recurring Nightmares -traumatic ending of prior job.  She denies sleepwalking, sleep eating, sleep talking or dream enactment behavior.  She has experienced sudden muscle weakness during the day: No      CAFFEINE AND OTHER SUBSTANCES:    Patient consumes caffeinated beverages per day:  1-2  Last caffeine use is usually: Noon  List of any prescribed or over the counter stimulants that patient takes:    List of any prescribed or over the counter sleep medication patient takes: 3 mg melatonin 2 x week  List of previous sleep medications that patient has tried:    Patient drinks alcohol to help them sleep: No  Patient drinks alcohol near bedtime: No    Family History:  Patient has a family member been diagnosed with a sleep disorder: Yes  Father and brother. Both are very fit.         SCALES:    EPWORTH SLEEPINESS SCALE      Hastings Sleepiness Scale ( ANTHONY Donis  [id number]<br>ESS - [place]/English - Final version - 21 Nov 07 - [facility] [place].) 9/20/2022   Sitting and reading Moderate chance of dozing   Watching TV Moderate chance of dozing   Sitting, inactive in a public place (e.g. a theatre or a meeting) Moderate chance of dozing   As a passenger in a car for an hour without a break Moderate chance of dozing   Lying down to rest in the afternoon when circumstances permit Moderate chance of dozing   Sitting and talking to someone Would never doze   Sitting quietly after a lunch without alcohol Slight chance of dozing   In a car, while stopped for a few minutes in traffic Would never doze   Hastings Score (MC) 11   Hastings Score (Sleep) 11         INSOMNIA SEVERITY INDEX (TRAVIS)      Insomnia Severity Index (TRAVIS) 9/20/2022   Difficulty falling asleep 1    Difficulty staying asleep 1   Problems waking up too early 1   How SATISFIED/DISSATISFIED are you with your CURRENT sleep pattern? 1   How NOTICEABLE to others do you think your sleep problem is in terms of impairing the quality of your life? 3   How WORRIED/DISTRESSED are you about your current sleep problem? 3   To what extent do you consider your sleep problem to INTERFERE with your daily functioning (e.g. daytime fatigue, mood, ability to function at work/daily chores, concentration, memory, mood, etc.) CURRENTLY? 3   TRAVIS Total Score 13       Guidelines for Scoring/Interpretation:  Total score categories:  0-7 = No clinically significant insomnia   8-14 = Subthreshold insomnia   15-21 = Clinical insomnia (moderate severity)  22-28 = Clinical insomnia (severe)  Used via courtesy of www.[employer]ric[employer].va.gov with permission from Jose Chavez PhD., [facility]      STOP BANG     STOP BANG Questionnaire (  2008, the American Society of Anesthesiologists, Inc. [employer] 9/23/2022   1. Snoring - Do you snore loudly (louder than talking or loud enough to be heard through closed doors)? yes   2. Tired - Do you often feel tired, fatigued, or sleepy during daytime? yes   3. Observed - Has anyone observed you stop breathing during your sleep? No   4. Blood pressure - Do you have or are you being treated for high blood pressure? Yes   5. BMI - BMI more than 35 kg/m2? Yes   6. Age - Age over 50 yr old? No   7. Neck circumference - Neck circumference greater than 40 cm? No   8. Gender - Gender male? No   STOP BANG Score (MC): 4/8   B/P Clinic: -   BMI Clinic: 40.72         GAD7    ROMANA-7  6/3/2022   1. Feeling nervous, anxious, or on edge 3   2. Not being able to stop or control worrying 3   3. Worrying too much about different things 3   4. Trouble relaxing 2   5. Being so restless that it is hard to sit still 0   6. Becoming easily annoyed or irritable 1   7. Feeling afraid, as if something  "awful might happen 2   ROMANA-7 Total Score 14   If you checked any problems, how difficult have they made it for you to do your work, take care of things at home, or get along with other people? -         CAGE-AID    No flowsheet data found.    CAGE-AID reprinted with permission from the [place] Medical Journal, MELIA Cortes. and TAMARA Madison, \"Conjoint screening questionnaires for alcohol and drug abuse\" [place] Medical Journal 94: 135-140, 1995.      PATIENT HEALTH QUESTIONNAIRE-9 (PHQ - 9)    PHQ-9 ([employer]) 6/3/2022   No Interest In Doing Things -   Feeling Depressed -   Trouble Sleeping -   Tired / No Energy -   No appetite or Over-Eating -   Feeling Bad about Self -   Trouble Concentrating -   Moving Slow or Restless -   Suicidal Thoughts -   Total Score -   1.  Little interest or pleasure in doing things 0   2.  Feeling down, depressed, or hopeless 3   3.  Trouble falling or staying asleep, or sleeping too much 1   4.  Feeling tired or having little energy 2   5.  Poor appetite or overeating 3   6.  Feeling bad about yourself 0   7.  Trouble concentrating 1   8.  Moving slowly or restless 0   9.  Suicidal or self-harm thoughts 0   PHQ-9 Total Score 10   Difficulty at work, home, or with people -   1.  Little interest or pleasure in doing things Not at all   2.  Feeling down, depressed, or hopeless Nearly every day   3.  Trouble falling or staying asleep, or sleeping too much Several days   4.  Feeling tired or having little energy More than half the days   5.  Poor appetite or overeating Nearly every day   6.  Feeling bad about yourself Not at all   7.  Trouble concentrating Several days   8.  Moving slowly or restless Not at all   9.  Suicidal or self-harm thoughts Not at all   PHQ-9 via [employer] TOTAL SCORE-----> 10 (Moderate depression)   Difficulty at work, home, or with people Not difficult at all       Developed by David Golden, Georgia Whatley, Stefan Valdes and colleagues, with an educational " suzy from [employer]. No permission required to reproduce, translate, display or distribute.        Allergies:    Allergies   Allergen Reactions     Seasonal Allergies      Cold symptoms, stuffy nose, itchy throat and eyes       Medications:    Current Outpatient Medications   Medication Sig Dispense Refill     albuterol (PROAIR HFA/PROVENTIL HFA/VENTOLIN HFA) 108 (90 Base) MCG/ACT inhaler Inhale 1-2 puffs into the lungs every 6 hours 6.7 g 1     ALPRAZolam (XANAX) 0.25 MG tablet Take 1 tablet (0.25 mg) by mouth daily as needed for anxiety Do not mix with alcohol or drive after taking 30 tablet 3     fluticasone (FLONASE) 50 MCG/ACT spray Spray 1 spray into both nostrils daily As needed       hydrOXYzine (ATARAX) 25 MG tablet TAKE 1-2 TABLETS (25-50 MG) BY MOUTH NIGHTLY AS NEEDED FOR ITCHING (Patient taking differently: Take 50 mg by mouth nightly as needed for anxiety or other (Sleep)) 30 tablet 1     levocetirizine (XYZAL) 5 MG tablet Take 1 tablet by mouth daily       lisinopril (ZESTRIL) 5 MG tablet Take 1 tablet (5 mg) by mouth daily 90 tablet 3     sertraline (ZOLOFT) 100 MG tablet Take 2 tablets (200 mg) by mouth daily 180 tablet 3     acetaminophen (TYLENOL) 325 MG tablet Take 2 tablets (650 mg) by mouth every 6 hours as needed for mild pain 50 tablet 0     ibuprofen (ADVIL/MOTRIN) 600 MG tablet Take 1 tablet (600 mg) by mouth every 6 hours as needed for other (mild and/or inflammatory pain) 30 tablet 0       Problem List:  Patient Active Problem List    Diagnosis Date Noted     S/P laparoscopic hysterectomy 05/17/2022     Priority: Medium     Recurrent UTI 10/01/2021     Priority: Medium     Menorrhagia with regular cycle 10/01/2021     Priority: Medium     Hx of abnormal cervical Pap smear 10/01/2021     Priority: Medium     Anxiety attack 10/01/2021     Priority: Medium     Adjustment disorder with mixed anxiety and depressed mood 10/01/2021     Priority: Medium     Cervical high risk HPV (human  papillomavirus) test positive 11/19/2020     Priority: Medium     2017 NIL Pap, Neg HPV  11/13/20 NIL Pap, + HR HPV (neg 16/18). Plan cotest in 1 year.   9/24/21 NIL pap, Neg HPV. Plan cotest in 1 year   9/23/22 Reminder paigehart           Class 3 severe obesity with serious comorbidity and body mass index (BMI) of 40.0 to 44.9 in adult, unspecified obesity type (H) 02/21/2020     Priority: Medium     Nasal obstruction 12/09/2019     Priority: Medium     Added automatically from request for surgery [id number]       Nasal septal deviation 12/09/2019     Priority: Medium     Added automatically from request for surgery [id number]       Nasal turbinate hypertrophy 12/09/2019     Priority: Medium     Added automatically from request for surgery [id number]       Adenoid hypertrophy 12/09/2019     Priority: Medium     Added automatically from request for surgery [id number]       Chronic tonsillitis 12/09/2019     Priority: Medium     Added automatically from request for surgery [id number]       Insomnia, unspecified type 11/22/2019     Priority: Medium     Hypertension, unspecified type 04/29/2014     Priority: Medium     Nasal polyp 12/27/2013     Priority: Medium     Bilateral occipital neuralgia 02/03/2013     Priority: Medium     Kidney stones 05/31/2011     Priority: Medium     + Kidney stone on R in 2009 with Lithotripsy.     2011- hx of kidney pain without stones in preg           Seasonal allergies      Priority: Medium     Zyrtec helps and has an Rx for Flonase       CARDIOVASCULAR SCREENING; LDL GOAL LESS THAN 160 10/31/2010     Priority: Medium     Anxiety 02/23/2010     Priority: Medium     Takes Buspar (15 mg BID) and Celexa (40 mg Q day)--stable on these meds  No counseling.          Past Medical/Surgical History:  Past Medical History:   Diagnosis Date     Cervical high risk HPV (human papillomavirus) test positive 11/13/2020     Depressive disorder 2000    I take Lexapro     HTN (hypertension)      Human papillomavirus in  conditions classified elsewhere and of unspecified site     genital warts , resolved     Mild or unspecified pre-eclampsia, antepartum      LOC (obstructive sleep apnea)      Pre-eclampsia      Seasonal allergies     Zyrtec helps and has an Rx for Flonase     Past Surgical History:   Procedure Laterality Date      SECTION  2011    Procedure: SECTION; Surgeon:CHRISSY GAFFNEY; Location:[facility] L[facility]      SECTION  2014    Procedure:  SECTION;  Surgeon: Chrissy Gaffney MD;  [facility]: [facility] L[facility]     CYSTOSCOPY, SLING TRANSVAGINAL N/A 2022    Procedure: CREATION, VAGINAL SLING, WITH CYSTOSCOPY;  Surgeon: Hany Thurston MD;  Location: [facility] OR     DAVINCI HYSTERECTOMY TOTAL, SALPINGECTOMY BILATERAL N/A 2022    Procedure: HYSTERECTOMY, TOTAL, ROBOT-ASSISTED, WITH BILATERAL SALPINGECTOMY, CYSTOSCOPY;  Surgeon: Shahida Coyne MD;  Location: [facility] OR     GYN SURGERY      tubal ligation     HC TOOTH EXTRACTION W/FORCEP      one dry socket     RW DENTAL (ABSTRACTED)       SEPTOPLASTY, TURBINOPLASTY, COMBINED Bilateral 3/9/2020    Procedure: SEPTOPLASTY, BILATERAL INFERIOR TURBINATE REDUCTION;  Surgeon: Jb Nava MD;  Location: [facility] OR     SURGICAL HISTORY OF -       lithotripsy     TONSILLECTOMY, ADENOIDECTOMY, COMBINED Bilateral 3/9/2020    Procedure: BILATERAL TONSILLECTOMY AND ADENOIDECTOMY;  Surgeon: Jb Nava MD;  Location: [facility] OR       Social History:  Social History     Socioeconomic History     Marital status:      Spouse name: Not on file     Number of children: Not on file     Years of education: Not on file     Highest education level: Not on file   Occupational History     Occupation: student     Employer: [employer]     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   Tobacco Use     Smoking status: Former Smoker     Packs/day: 0.00     Years: 10.00     Pack years: 0.00     Types: Cigarettes     Start date: 1994     Quit date:  2005     Years since quittin.6     Smokeless tobacco: Never Used     Tobacco comment: Social smoker   Vaping Use     Vaping Use: Never used   Substance and Sexual Activity     Alcohol use: Yes     Comment: Socially     Drug use: Yes     Types: Marijuana     Comment: Gummies 2-3 per month     Sexual activity: Yes     Partners: Male     Birth control/protection: Female Surgical     Comment: Tubal ligation post c section    Other Topics Concern     Parent/sibling w/ CABG, MI or angioplasty before 65F 55M? No   Social History Narrative    Caffeine intake/servings daily - 0    Calcium intake/servings daily - 3    Exercise 5 times weekly - describe gym    Sunscreen used - Yes    Seatbelts used - Yes    Guns stored in the home - No    Self Breast Exam - Yes    Pap test up to date -  Yes    Eye exam up to date -  Yes    Dental exam up to date -  Yes    DEXA scan up to date -  No    Flex Sig/Colonoscopy up to date -  No    Mammography up to date -  No    Immunizations reviewed and up to date - Yes    Abuse: Current or Past (Physical, Sexual or Emotional) - No    Do you feel safe in your environment - Yes    Do you cope well with stress - Yes    Do you suffer from insomnia - No    Last updated by: Radha Mendoza  2013     Social Determinants of Health     Financial Resource Strain: Not on file   Food Insecurity: Not on file   Transportation Needs: Not on file   Physical Activity: Not on file   Stress: Not on file   Social Connections: Not on file   Intimate Partner Violence: Not on file   Housing Stability: Not on file       Family History:  Family History   Problem Relation Age of Onset     C.A.D. Paternal Grandfather          of MI     Breast Cancer Maternal Grandmother              Cancer - colorectal Maternal Grandmother      Circulatory Maternal Grandmother         aneurism     Prostate Cancer Maternal Grandfather              Eye Surgery Maternal Grandfather          "cataracts, late in life     Eye Disorder Father         detached retina     Genitourinary Problems Father         kidney stones     Macular Degeneration Father      Hypertension Father      Nephrolithiasis Father      Cerebrovascular Disease Paternal Grandmother              Glaucoma No family hx of        Review of Systems:  A complete review of systems reviewed by me is negative with the exeption of what has been mentioned in the history of present illness.  In the last TWO WEEKS have you experienced any of the following symptoms?  Fevers: No  Night Sweats: Yes  Weight Gain: No  Pain at Night: Yes  Double Vision: No  Changes in Vision: Yes  Difficulty Breathing through Nose: Yes  Sore Throat in Morning: Yes  Dry Mouth in the Morning: Yes  Shortness of Breath Lying Flat: No  Shortness of Breath With Activity: No  Awakening with Shortness of Breath: No  Increased Cough: No  Heart Racing at Night: No  Swelling in Feet or Legs: No  Diarrhea at Night: Yes  Heartburn at Night: Yes  Urinating More than Once at Night: No  Losing Control of Urine at Night: No  Joint Pains at Night: Yes  Headaches in Morning: Yes  Weakness in Arms or Legs: No  Depressed Mood: Yes  Anxiety: Yes      Physical Examination:  Vitals: Ht 1.702 m (5' 7\")   Wt 117.9 kg (260 lb)   LMP 2022 (Exact Date)   BMI 40.72 kg/m    BMI= Body mass index is 40.72 kg/m .  General: No apparent distress, appropriately groomed  Head: Normocephalic, atraumatic  Neck:Circumference: 13 3/4\"inches( patient reported)  Chest: No cough, no audible wheezing, able to talk in full sentences  Skin: no rash  Psych: coherent speech, normal rate and volume, able to articulate logical thoughts, able   to abstract reason, no tangential thoughts, no hallucinations   or delusions  Her affect is normal  Neuro:  Mental status: Alert and  Oriented X 3  Speech: normal          Data: All pertinent previous laboratory data reviewed     Recent Labs   Lab Test " 05/18/22  1722 05/17/22  0635 05/02/22  1220 09/24/21  1511   NA  --   --  142 140   POTASSIUM  --   --  4.6 4.2   CHLORIDE  --   --  104 105   CO2  --   --  30 26   ANIONGAP  --   --  8 9   GLC 99 100* 95 107*   BUN  --   --  12 13   CR  --   --  0.69 0.81   CJ  --   --  9.3 8.9       Recent Labs   Lab Test 07/25/22  1228 05/18/22  0529 05/17/22  0642   WBC  --   --  9.4   RBC  --   --  4.82   HGB 14.1   < > 13.9   HCT  --   --  42.3   MCV  --   --  88   MCH  --   --  28.8   MCHC  --   --  32.9   RDW  --   --  13.3   PLT  --   --  314    < > = values in this interval not displayed.       No results for input(s): PROTTOTAL, ALBUMIN, BILITOTAL, ALKPHOS, AST, ALT, BILIDIRECT in the last 03868 hours.    TSH (mU/L)   Date Value   11/13/2020 1.51   08/21/2013 1.19       No results found for: UAMP, UBARB, BENZODIAZEUR, UCANN, UCOC, OPIT, UPCP    No results found for: IRONSAT, [id number], KATHRYN    No results found for: PH, PHARTERIAL, PO2, [id number], SAT, PCO2, HCO3, BASEEXCESS, ARLENE, BEB    Stress Echocardiogarphy:  6/9/22:  Interpretation Summary:  Normal exercise echocardiogram without evidence of inducible ischemia.  Target heart rate was achieved. Heart rate and blood pressure response to  exercise were normal. Normal LV function and wall motion at rest. With stress,  the left ventricular ejection fraction increased from 55-60% to greater than  65% and the left ventricular size decreased appropriately. No regional wall  motion abnormality with stress.  Normal functional capacity. No subjective symptoms to suggest ischemia.  There was no ECG evidence of ischemia.  No significant valve disease on screening doppler evaluation. The aortic root  and visualized ascending aorta are normal.       Chest x-ray: XR Chest 2 Views 06/03/2022    Narrative  CHEST TWO VIEWS 6/3/2022 11:36 AM    HISTORY: Nocturnal oxygen desaturation.    COMPARISON: 11/28/2014    FINDINGS: Heart size and pulmonary vascularity are within normal  limits.  The lungs are clear. No pneumothorax or pleural effusion.    Impression  IMPRESSION: Normal two views of the chest.    NANDO CISNEROS MD      SYSTEM ID:  [id number]      Chest CT: No results found for this or any previous visit from the past 365 days.      PFT: Most Recent [facility] Pulmonary Function Testing    FVC-Pred   Date Value Ref Range Status   07/25/2022 3.90 L      FVC-Pre   Date Value Ref Range Status   07/25/2022 4.28 L      FVC-%Pred-Pre   Date Value Ref Range Status   07/25/2022 109 %      FEV1-Pre   Date Value Ref Range Status   07/25/2022 3.36 L      FEV1-%Pred-Pre   Date Value Ref Range Status   07/25/2022 107 %      FEV1FVC-Pred   Date Value Ref Range Status   07/25/2022 81 %      FEV1FVC-Pre   Date Value Ref Range Status   07/25/2022 79 %      No results found for: 20029  FEFMax-Pred   Date Value Ref Range Status   07/25/2022 7.27 L/sec      FEFMax-Pre   Date Value Ref Range Status   07/25/2022 7.82 L/sec      FEFMax-%Pred-Pre   Date Value Ref Range Status   07/25/2022 107 %      ExpTime-Pre   Date Value Ref Range Status   07/25/2022 6.44 sec      FIFMax-Pre   Date Value Ref Range Status   07/25/2022 6.31 L/sec      FEV1FEV6-Pred   Date Value Ref Range Status   07/25/2022 83 %      FEV1FEV6-Pre   Date Value Ref Range Status   07/25/2022 79 %          Jocelin Rios MD 9/23/2022

## 2022-10-10 NOTE — NURSING NOTE
Writer left detail message for patient to call back and schedule a PSG w/TCM. Please see telephone encounter regarding this. Patient called to schedule PSG.

## 2022-10-25 NOTE — TELEPHONE ENCOUNTER
Dr. Coyne    Pt had hysterectomy 5/17/22 with you. Cervix was benign. We had been tracking her for positive HPV. See hx below. She states she has hx of abnormal pap, but no cervical dysplasia that she is aware of.  Okay to discontinue pap smears?    2017 NIL Pap, Neg HPV  11/13/20 NIL Pap, + HR HPV (neg 16/18). Plan cotest in 1 year.   9/24/21 NIL pap, Neg HPV. Plan cotest in 1 year   5/17/22 hysterectomy- benign cervix    Thanks!  Brenda Mcclure RN, BSN

## 2022-11-11 ENCOUNTER — LAB (OUTPATIENT)
Dept: LAB | Facility: CLINIC | Age: 47
End: 2022-11-11
Payer: COMMERCIAL

## 2022-11-11 DIAGNOSIS — Z20.822 ENCOUNTER FOR LABORATORY TESTING FOR COVID-19 VIRUS: ICD-10-CM

## 2022-11-11 PROCEDURE — U0003 INFECTIOUS AGENT DETECTION BY NUCLEIC ACID (DNA OR RNA); SEVERE ACUTE RESPIRATORY SYNDROME CORONAVIRUS 2 (SARS-COV-2) (CORONAVIRUS DISEASE [COVID-19]), AMPLIFIED PROBE TECHNIQUE, MAKING USE OF HIGH THROUGHPUT TECHNOLOGIES AS DESCRIBED BY CMS-2020-01-R: HCPCS

## 2022-11-11 PROCEDURE — U0005 INFEC AGEN DETEC AMPLI PROBE: HCPCS

## 2022-11-12 LAB — SARS-COV-2 RNA RESP QL NAA+PROBE: NEGATIVE

## 2022-11-13 ENCOUNTER — THERAPY VISIT (OUTPATIENT)
Dept: SLEEP MEDICINE | Facility: CLINIC | Age: 47
End: 2022-11-13
Attending: INTERNAL MEDICINE
Payer: COMMERCIAL

## 2022-11-13 ENCOUNTER — DOCUMENTATION ONLY (OUTPATIENT)
Dept: SLEEP MEDICINE | Facility: CLINIC | Age: 47
End: 2022-11-13

## 2022-11-13 DIAGNOSIS — G47.9 SLEEP DISTURBANCE: ICD-10-CM

## 2022-11-13 PROCEDURE — 95810 POLYSOM 6/> YRS 4/> PARAM: CPT | Performed by: INTERNAL MEDICINE

## 2022-11-14 ENCOUNTER — E-VISIT (OUTPATIENT)
Dept: URGENT CARE | Facility: CLINIC | Age: 47
End: 2022-11-14
Payer: COMMERCIAL

## 2022-11-14 ENCOUNTER — MYC MEDICAL ADVICE (OUTPATIENT)
Dept: SLEEP MEDICINE | Facility: CLINIC | Age: 47
End: 2022-11-14

## 2022-11-14 DIAGNOSIS — L70.9 ACNE, UNSPECIFIED ACNE TYPE: Primary | ICD-10-CM

## 2022-11-14 PROCEDURE — 99207 PR NON-BILLABLE SERV PER CHARTING: CPT | Performed by: FAMILY MEDICINE

## 2022-11-14 NOTE — PATIENT INSTRUCTIONS
Dear Zaida Lao?     After reviewing your responses, I am unable to make a diagnosis that can be treated online.    You will not be charged for this eVisit.     We are dedicated to helping you achieve your best health and would like to see you in one of our many clinic locations - a primary care provider would be ideal for your concern.    Please use [employer] to schedule a visit with a provider or call [phone]) to schedule at any of our locations.    Thanks for choosing?us?as your health care partner,?   ?   Teena Vazquez MD?

## 2022-11-20 ENCOUNTER — HEALTH MAINTENANCE LETTER (OUTPATIENT)
Age: 47
End: 2022-11-20

## 2022-11-27 NOTE — PROCEDURES
" SLEEP STUDY INTERPRETATION  DIAGNOSTIC POLYSOMNOGRAPHY REPORT      Patient: ISELA SWANSON  YOB: 1975  Study Date: 11/13/2022  MRN: [id number]  Referring Provider: Yara Salcedo NP  Ordering Provider: Pradeep Rios MD    Indications for Polysomnography: The patient is a 47-year-old Female who is 5' 7\" and weighs 260.0 lbs. Her BMI is 40.8, [place] sleepiness scale 11/24 and neck circumference is 38 cm. Relevant medical history includes Obstructive sleep apnea intolerant to CPAP, Anxiety, Depression. A diagnostic polysomnogram was performed to evaluate for sleep apnea, hypoventilation, and hypoxemia.    Polysomnogram Data: A full night polysomnogram recorded the standard physiologic parameters including EEG, EOG, EMG, ECG, nasal and oral airflow. Respiratory parameters of chest and abdominal movements were recorded with respiratory inductance plethysmography. Oxygen saturation was recorded by pulse oximetry. Hypopnea scoring rule used: 1B 4%.    Sleep Architecture: All stages of sleep were noted. Both sleep stages N3 and REM sleep were reduced. Sleep fragmentation was noted, mostly due to spontaneous arousals and due to respiratory events.  The total recording time of the polysomnogram was 438.5 minutes. The total sleep time was 405.5 minutes. Sleep latency was normal at 12.0 minutes without the use of a sleep aid. REM latency was 185.0 minutes. Arousal index was increased at 65.8 arousals per hour (mostly due to spontaneous arousals and some due to respiratory events). Sleep efficiency was normal at 92.5%. Wake after sleep onset was 21.0 minutes. The patient spent 16.5% of total sleep time in Stage N1, 62.1% in Stage N2, 8.4% in Stage N3, and 12.9% in REM. Time in REM supine was 41.0 minutes.    Respiration: Severe obstructive sleep apnea was present, with sleep related hypoxemia and without hypoventilation.    Events ? The polysomnogram revealed a presence of 2 obstructive, 9 " central, and 5 mixed apneas resulting in an apnea index of 2.4 events per hour. There were 205 obstructive hypopneas and 0 central hypopneas resulting in an obstructive hypopnea index of 30.3 and central hypopnea index of - events per hour. The combined apnea/hypopnea index was 32.7 events per hour (central apnea/hypopnea index was 1.3 events per hour). The REM AHI was 33.1 events per hour. The supine AHI was 38.1 events per hour. The RERA index was 8.3 events per hour.  The RDI was 41.0 events per hour.    Snoring - was reported as moderate to loud.    Respiratory rate and pattern - was notable for normal respiratory rate and pattern.    Sustained Sleep Associated Hypoventilation - Transcutaneous carbon dioxide monitoring was used, however significant hypoventilation was not present with a maximum change from 38.8 to 49.1 mmHg and 0 minutes at or greater than 55 mmHg.    Sleep Associated Hypoxemia - (Greater than 5 minutes O2 sat at or below 88%) was present. Baseline oxygen saturation was 91.4%. Lowest oxygen saturation was 77.0%. Time spent less than or equal to 88% was 66.1 minutes. Time spent less than or equal to 89% was 85.4 minutes.    Movement Activity: There were no significant periodic limb movements during the study.    Periodic Limb Activity - There were 6 PLMs during the entire study. The PLM index was 0.9 movements per hour. The PLM Arousal Index was 0.6 per hour.    REM EMG Activity - Excessive transient/sustained muscle activity was not present.    Nocturnal Behavior - Abnormal sleep related behaviors were not noted during/arising out of NREM / REM sleep.     Bruxism - Not apparent.    Cardiac Summary: Normal sinus rhythm was noted.  The average pulse rate was 75.1 bpm. The minimum pulse rate was 59.0 bpm while the maximum pulse rate was 103.0 bpm.  Arrhythmias were not noted.    Assessment:     All stages of sleep were noted. Both sleep stages N3 and REM sleep were reduced. Sleep fragmentation was  noted, mostly due to spontaneous arousals and due to respiratory events.    Severe obstructive sleep apnea was present, with sleep related hypoxemia and without hypoventilation.    There were no significant periodic limb movements during the study.    There were no abnormal sleep related behaviors were noted.    Normal sinus rhythm was noted.    Recommendations:    Recommend repeat polysomnography with full night titration of positive airway pressure therapy for the control of sleep disordered breathing.    Alternate option to control the LOC/hypoxemia includes a) empiric treatment using auto-titrating CPAP device with pressure settings 5-15 cm water or b) dental appliance through referral to dentistry or c) evaluation for upper airway surgical options through referral to sleep ENT provider.    Advice regarding the risks of drowsy driving.    Suggest optimizing sleep schedule and avoiding sleep deprivation.    Weight management (if BMI > 30).    Diagnostic Codes:   Obstructive Sleep Apnea G47.33  Sleep Hypoxemia G47.36   Repetitive Intrusions Into Sleep F51.8    Anna Lim MD  Sleep Medicine Fellow    11/13/2022 [place] [facility] Sleep Study (260.0 lbs) - AHI 32.7, RDI 41.0, Supine AHI 38.1, REM AHI 33.1, Low O2 77.0%, Time Spent ?88% 66.1 minutes / Time Spent ?89% 85.4 minutes.    Attending MD: PSG was personally reviewed in detail with the Sleep Medicine Fellow.  The above interpretation reflects our joint assessment and recommendations.   _____________________________________   Electronically Signed By: (Alix Rios MD)  (11/26/22)

## 2022-11-28 LAB — SLPCOMP: NORMAL

## 2022-11-28 NOTE — TELEPHONE ENCOUNTER
"Writer spoke with patient and she decided she will keep her appointment due to Dr. Rios's full schedule and patient's schedule. Writer placed patient on \"waitList\". No further actions needed.   "

## 2022-11-28 NOTE — RESULT ENCOUNTER NOTE
Dewey Cerda,    Thank you for your recent office visit.    Here are your recent results.  Per sleep medicine- Recommendations:    Recommend repeat polysomnography with full night titration of positive airway pressure therapy for the control of sleep disordered breathing.    Alternate option to control the LOC/hypoxemia includes a) empiric treatment using auto-titrating CPAP device with pressure settings 5-15 cm water or b) dental appliance through referral to dentistry or c) evaluation for upper airway surgical options through referral to sleep ENT provider.    Advice regarding the risks of drowsy driving.    Suggest optimizing sleep schedule and avoiding sleep deprivation.    Weight management (if BMI > 30).     Diagnostic Codes:   Obstructive Sleep Apnea G47.33  Sleep Hypoxemia G47.36   Repetitive Intrusions Into Sleep F51.8    Feel free to contact me via [employer] or call the clinic at [phone].    Sincerely,    REBEKAH Orozco, FNP-BC

## 2022-12-02 ENCOUNTER — VIRTUAL VISIT (OUTPATIENT)
Dept: SLEEP MEDICINE | Facility: CLINIC | Age: 47
End: 2022-12-02
Payer: COMMERCIAL

## 2022-12-02 VITALS — BODY MASS INDEX: 39.4 KG/M2 | WEIGHT: 260 LBS | HEIGHT: 68 IN

## 2022-12-02 DIAGNOSIS — G47.33 OSA (OBSTRUCTIVE SLEEP APNEA): Primary | ICD-10-CM

## 2022-12-02 DIAGNOSIS — E66.9 OBESITY WITH SLEEP APNEA: ICD-10-CM

## 2022-12-02 DIAGNOSIS — G47.30 OBESITY WITH SLEEP APNEA: ICD-10-CM

## 2022-12-02 PROCEDURE — 99214 OFFICE O/P EST MOD 30 MIN: CPT | Mod: GT | Performed by: INTERNAL MEDICINE

## 2022-12-02 ASSESSMENT — SLEEP AND FATIGUE QUESTIONNAIRES
HOW LIKELY ARE YOU TO NOD OFF OR FALL ASLEEP WHILE SITTING AND READING: SLIGHT CHANCE OF DOZING
HOW LIKELY ARE YOU TO NOD OFF OR FALL ASLEEP WHILE SITTING INACTIVE IN A PUBLIC PLACE: WOULD NEVER DOZE
HOW LIKELY ARE YOU TO NOD OFF OR FALL ASLEEP WHILE SITTING QUIETLY AFTER LUNCH WITHOUT ALCOHOL: SLIGHT CHANCE OF DOZING
HOW LIKELY ARE YOU TO NOD OFF OR FALL ASLEEP IN A CAR, WHILE STOPPED FOR A FEW MINUTES IN TRAFFIC: WOULD NEVER DOZE
HOW LIKELY ARE YOU TO NOD OFF OR FALL ASLEEP WHILE LYING DOWN TO REST IN THE AFTERNOON WHEN CIRCUMSTANCES PERMIT: MODERATE CHANCE OF DOZING
HOW LIKELY ARE YOU TO NOD OFF OR FALL ASLEEP WHEN YOU ARE A PASSENGER IN A CAR FOR AN HOUR WITHOUT A BREAK: SLIGHT CHANCE OF DOZING
HOW LIKELY ARE YOU TO NOD OFF OR FALL ASLEEP WHILE WATCHING TV: MODERATE CHANCE OF DOZING
HOW LIKELY ARE YOU TO NOD OFF OR FALL ASLEEP WHILE SITTING AND TALKING TO SOMEONE: WOULD NEVER DOZE

## 2022-12-02 ASSESSMENT — PAIN SCALES - GENERAL: PAINLEVEL: NO PAIN (0)

## 2022-12-02 NOTE — PROGRESS NOTES
Zaida is a 47 year old who is being evaluated via a billable video visit.      How would you like to obtain your AVS? MyChart  If the video visit is dropped, the invitation should be resent by: Text to cell phone: [phone]  Will anyone else be joining your video visit? Tresa    Jb Mtz      Video-Visit Details    Video Start Time: 2:53 PM     Type of service:  Video Visit    Video End Time:  3:12 PM    Originating Location (pt. Location): Home        Distant Location (provider location):  Off-site    Platform used for Video Visit: [facility]  Sleep clinic follow-up visit note      Date on this visit:  December 2, 2022     Chief complaint: Review results of recently obtained  sleep  study     Zaida Lao is a very pleasant 47-year-old female with previously diagnosed obstructive sleep apnea, intolerant to CPAP therapy, obesity, anxiety and depression.  She underwent a diagnostic polysomnography on November 13, 2022 to reevaluate the overall severity of the sleep disordered breathing. Today's video visit is scheduled to review the test results and to discuss plan of care.    Polysomnogram report:  Study date: November 13, 2022   Sleep Architecture: All stages of sleep were noted. Both sleep stages N3 and REM sleep were reduced. Sleep fragmentation was noted, mostly due to spontaneous arousals and due to respiratory events.  The total recording time of the polysomnogram was 438.5 minutes. The total sleep time was 405.5 minutes. Sleep latency was normal at 12.0 minutes without the use of a sleep aid. REM latency was 185.0 minutes. Arousal index was increased at 65.8 arousals per hour (mostly due to spontaneous arousals and some due to respiratory events). Sleep efficiency was normal at 92.5%. Wake after sleep onset was 21.0 minutes. The patient spent 16.5% of total sleep time in Stage N1, 62.1% in Stage N2, 8.4% in Stage N3, and 12.9% in REM. Time in REM supine was 41.0  minutes.     Respiration: Severe obstructive sleep apnea was present, with sleep related hypoxemia and without hypoventilation.    Events ? The polysomnogram revealed a presence of 2 obstructive, 9 central, and 5 mixed apneas resulting in an apnea index of 2.4 events per hour. There were 205 obstructive hypopneas and 0 central hypopneas resulting in an obstructive hypopnea index of 30.3 and central hypopnea index of - events per hour. The combined apnea/hypopnea index was 32.7 events per hour (central apnea/hypopnea index was 1.3 events per hour). The REM AHI was 33.1 events per hour. The supine AHI was 38.1 events per hour. The RERA index was 8.3 events per hour.  The RDI was 41.0 events per hour.    Snoring - was reported as moderate to loud.    Respiratory rate and pattern - was notable for normal respiratory rate and pattern.    Sustained Sleep Associated Hypoventilation - Transcutaneous carbon dioxide monitoring was used, however significant hypoventilation was not present with a maximum change from 38.8 to 49.1 mmHg and 0 minutes at or greater than 55 mmHg.    Sleep Associated Hypoxemia - (Greater than 5 minutes O2 sat at or below 88%) was present. Baseline oxygen saturation was 91.4%. Lowest oxygen saturation was 77.0%. Time spent less than or equal to 88% was 66.1 minutes. Time spent less than or equal to 89% was 85.4 minutes.     Movement Activity: There were no significant periodic limb movements during the study.    Periodic Limb Activity - There were 6 PLMs during the entire study. The PLM index was 0.9 movements per hour. The PLM Arousal Index was 0.6 per hour.    REM EMG Activity - Excessive transient/sustained muscle activity was not present.    Nocturnal Behavior - Abnormal sleep related behaviors were not noted during/arising out of NREM / REM sleep.     Bruxism - Not apparent.     Cardiac Summary: Normal sinus rhythm was noted.  The average pulse rate was 75.1 bpm. The minimum pulse rate was 59.0  "bpm while the maximum pulse rate was 103.0 bpm.  Arrhythmias were not noted.     Assessment:     All stages of sleep were noted. Both sleep stages N3 and REM sleep were reduced. Sleep fragmentation was noted, mostly due to spontaneous arousals and due to respiratory events.    Severe obstructive sleep apnea was present, with sleep related hypoxemia and without hypoventilation.    There were no significant periodic limb movements during the study.    There were no abnormal sleep related behaviors were noted.    Normal sinus rhythm was noted.       Test results were discussed with the patient in detail.    Past medical/surgical history, family history, social history, medications and allergies were reviewed.            Physical Examination:    Ht 1.727 m (5' 8\")   Wt 117.9 kg (260 lb)   LMP 04/25/2022 (Exact Date)   BMI 39.53 kg/m    General: Pleasant. Cooperative. In no apparent distress.  Pulmonary: Able to speak in full sentences easily. No cough or wheeze.   Neurologic: Alert, oriented x3.  Psychiatric: Mood euthymic. Affect congruent with full range and intensity.             Assessment and Plan:    Severe obstructive sleep apnea with sleep associated hypoxemia: The results of the sleep study were discussed with the patient in detail.  We discussed the consequences of untreated sleep disordered breathing and that CPAP is a better option compared to the non-CPAP options including dental appliance and upper airway surgical options, to treat the sleep disordered breathing based on the overall severity of the LOC and hypoxemia.  Patient was willing to reconsider CPAP treatment.  She reported that she still has the CPAP device that she obtained following the sleep study in 2019.  Patient was instructed to take  the device to [facility] to ensure that the pressure settings are set to range between 5 to 15 cm water and to obtain mask optimization and new supplies.  I have communicated with [facility] " "medical DME through [facility] to help the patient. She was recommended to use the device regularly during sleep and get back to us if any interface concerns.  We discussed compliance goals and she will be followed through sleep therapy management program.   Patient was instructed to call the sleep clinic after she obtains the mask fitting and gets the pressures set on her CPAP through [facility] medical DME, to schedule a follow-up video visit in approximately 7 to 8 weeks when compliance measures will be reviewed.    Obesity: We discussed weight management with diet and exercise    Patient was strongly advised to avoid driving, operating any heavy machinery or other hazardous situations while drowsy or sleepy.  Patient was counseled on the importance of driving while alert, to pull over if drowsy, or nap before getting into the vehicle if sleepy.     The above note was dictated using voice recognition software. Although reviewed after completion, some word and grammatical error may remain . Please contact the author for any clarifications.     \"I spent a total of  30 minutes with Zaida Lalawandacarlee during today's video visit. Most of this time was spent counseling the patient and  coordinating care regarding LOC,  consequences of untreated sleep apnea, CPAP treatment,  weight management , going over results of  recent diagnostic sleep study and chart review., including documentation and further activities as noted above.\"       Jocelin Rios MD  [facility]   Floor 1, [address]   [address] [place] [address]   Appointments: [phone]  "

## 2022-12-02 NOTE — PATIENT INSTRUCTIONS
I have communicated with [facility] medical DME to reach out to you to set up an appointment so you can bring your CPAP device to them in order to make sure the pressure settings are correctly set to get new supplies and mask fitting.  After obtaining the new supplies and getting pressures set through [facility] medical DME, please use the device regularly during sleep and let us know if any concerns and please call our sleep clinic at [phone] to schedule a follow-up video visit with us in approximately 7 to 8 weeks, so we can review the compliance measures.

## 2022-12-20 NOTE — TELEPHONE ENCOUNTER
Yes, ok to stop     Shahida Coyne MD, FACOG   (she/her/hers)      Department of Ob/Gyn/Women's Health   [employer]   [facility]  [address] [place] [address]. [place] [address]   [email or web address]  p. [phone]  f. [phone]

## 2023-01-04 ENCOUNTER — TELEPHONE (OUTPATIENT)
Dept: OBGYN | Facility: CLINIC | Age: 48
End: 2023-01-04

## 2023-01-04 NOTE — TELEPHONE ENCOUNTER
Patient calling inquiring about whether form was received / completed. Located form in manilla envelope in Dr. Coyne mail bin. Faxed to [facility] per patient request. Placed back in [facility]'s manilla envelope and into Dr. Coyne's mail bin.

## 2023-08-17 DIAGNOSIS — F43.23 ADJUSTMENT DISORDER WITH MIXED ANXIETY AND DEPRESSED MOOD: ICD-10-CM

## 2023-08-17 RX ORDER — SERTRALINE HYDROCHLORIDE 100 MG/1
200 TABLET, FILM COATED ORAL DAILY
Qty: 40 TABLET | Refills: 0 | Status: SHIPPED | OUTPATIENT
Start: 2023-08-17 | End: 2023-09-13

## 2023-09-12 DIAGNOSIS — F43.23 ADJUSTMENT DISORDER WITH MIXED ANXIETY AND DEPRESSED MOOD: ICD-10-CM

## 2023-09-13 RX ORDER — SERTRALINE HYDROCHLORIDE 100 MG/1
200 TABLET, FILM COATED ORAL DAILY
Qty: 20 TABLET | Refills: 0 | Status: SHIPPED | OUTPATIENT
Start: 2023-09-13 | End: 2023-10-02

## 2023-09-16 ENCOUNTER — HEALTH MAINTENANCE LETTER (OUTPATIENT)
Age: 48
End: 2023-09-16

## 2023-09-30 ASSESSMENT — ENCOUNTER SYMPTOMS
MYALGIAS: 0
COUGH: 1
NAUSEA: 0
SORE THROAT: 0
ARTHRALGIAS: 0
JOINT SWELLING: 0
SHORTNESS OF BREATH: 0
NERVOUS/ANXIOUS: 1
DIZZINESS: 0
CHILLS: 0
HEMATURIA: 0
FREQUENCY: 0
FEVER: 0
HEARTBURN: 0
ABDOMINAL PAIN: 0
HEMATOCHEZIA: 0
WEAKNESS: 0
DYSURIA: 0
PALPITATIONS: 0
HEADACHES: 0
CONSTIPATION: 0
PARESTHESIAS: 0
EYE PAIN: 0
BREAST MASS: 0
DIARRHEA: 0

## 2023-10-02 ENCOUNTER — OFFICE VISIT (OUTPATIENT)
Dept: FAMILY MEDICINE | Facility: CLINIC | Age: 48
End: 2023-10-02
Payer: COMMERCIAL

## 2023-10-02 VITALS
SYSTOLIC BLOOD PRESSURE: 120 MMHG | OXYGEN SATURATION: 99 % | WEIGHT: 254.2 LBS | HEIGHT: 66 IN | RESPIRATION RATE: 20 BRPM | DIASTOLIC BLOOD PRESSURE: 72 MMHG | HEART RATE: 93 BPM | BODY MASS INDEX: 40.85 KG/M2 | TEMPERATURE: 97 F

## 2023-10-02 DIAGNOSIS — Z00.00 ROUTINE GENERAL MEDICAL EXAMINATION AT A HEALTH CARE FACILITY: Primary | ICD-10-CM

## 2023-10-02 DIAGNOSIS — F41.9 ANXIETY: ICD-10-CM

## 2023-10-02 DIAGNOSIS — Z13.220 SCREENING CHOLESTEROL LEVEL: ICD-10-CM

## 2023-10-02 DIAGNOSIS — E66.01 CLASS 3 SEVERE OBESITY DUE TO EXCESS CALORIES WITH SERIOUS COMORBIDITY AND BODY MASS INDEX (BMI) OF 40.0 TO 44.9 IN ADULT (H): ICD-10-CM

## 2023-10-02 DIAGNOSIS — L65.9 HAIR THINNING: ICD-10-CM

## 2023-10-02 DIAGNOSIS — Z13.1 SCREENING FOR DIABETES MELLITUS: ICD-10-CM

## 2023-10-02 DIAGNOSIS — G47.00 PERSISTENT INSOMNIA: ICD-10-CM

## 2023-10-02 DIAGNOSIS — Z80.3 FAMILY HISTORY OF MALIGNANT NEOPLASM OF BREAST: ICD-10-CM

## 2023-10-02 DIAGNOSIS — Z13.29 SCREENING FOR THYROID DISORDER: ICD-10-CM

## 2023-10-02 DIAGNOSIS — F41.0 ANXIETY ATTACK: ICD-10-CM

## 2023-10-02 DIAGNOSIS — I10 BENIGN ESSENTIAL HTN: ICD-10-CM

## 2023-10-02 DIAGNOSIS — E66.813 CLASS 3 SEVERE OBESITY DUE TO EXCESS CALORIES WITH SERIOUS COMORBIDITY AND BODY MASS INDEX (BMI) OF 40.0 TO 44.9 IN ADULT (H): ICD-10-CM

## 2023-10-02 DIAGNOSIS — Z12.31 VISIT FOR SCREENING MAMMOGRAM: ICD-10-CM

## 2023-10-02 DIAGNOSIS — F43.23 ADJUSTMENT DISORDER WITH MIXED ANXIETY AND DEPRESSED MOOD: ICD-10-CM

## 2023-10-02 DIAGNOSIS — R06.2 WHEEZING: ICD-10-CM

## 2023-10-02 PROCEDURE — 90746 HEPB VACCINE 3 DOSE ADULT IM: CPT | Performed by: NURSE PRACTITIONER

## 2023-10-02 PROCEDURE — 99396 PREV VISIT EST AGE 40-64: CPT | Mod: 25 | Performed by: NURSE PRACTITIONER

## 2023-10-02 PROCEDURE — 90471 IMMUNIZATION ADMIN: CPT | Performed by: NURSE PRACTITIONER

## 2023-10-02 PROCEDURE — 80048 BASIC METABOLIC PNL TOTAL CA: CPT | Performed by: NURSE PRACTITIONER

## 2023-10-02 PROCEDURE — 36415 COLL VENOUS BLD VENIPUNCTURE: CPT | Performed by: NURSE PRACTITIONER

## 2023-10-02 PROCEDURE — 91320 SARSCV2 VAC 30MCG TRS-SUC IM: CPT | Performed by: NURSE PRACTITIONER

## 2023-10-02 PROCEDURE — 90480 ADMN SARSCOV2 VAC 1/ONLY CMP: CPT | Performed by: NURSE PRACTITIONER

## 2023-10-02 PROCEDURE — 90686 IIV4 VACC NO PRSV 0.5 ML IM: CPT | Performed by: NURSE PRACTITIONER

## 2023-10-02 PROCEDURE — 90472 IMMUNIZATION ADMIN EACH ADD: CPT | Performed by: NURSE PRACTITIONER

## 2023-10-02 PROCEDURE — 99214 OFFICE O/P EST MOD 30 MIN: CPT | Mod: 25 | Performed by: NURSE PRACTITIONER

## 2023-10-02 PROCEDURE — 84443 ASSAY THYROID STIM HORMONE: CPT | Performed by: NURSE PRACTITIONER

## 2023-10-02 PROCEDURE — 80061 LIPID PANEL: CPT | Performed by: NURSE PRACTITIONER

## 2023-10-02 PROCEDURE — 86376 MICROSOMAL ANTIBODY EACH: CPT | Performed by: NURSE PRACTITIONER

## 2023-10-02 PROCEDURE — 84481 FREE ASSAY (FT-3): CPT | Performed by: NURSE PRACTITIONER

## 2023-10-02 RX ORDER — LISINOPRIL 5 MG/1
5 TABLET ORAL DAILY
Qty: 90 TABLET | Refills: 3 | Status: SHIPPED | OUTPATIENT
Start: 2023-10-02 | End: 2024-09-30

## 2023-10-02 RX ORDER — ALBUTEROL SULFATE 90 UG/1
1-2 AEROSOL, METERED RESPIRATORY (INHALATION) EVERY 6 HOURS
Qty: 6.7 G | Refills: 5 | Status: SHIPPED | OUTPATIENT
Start: 2023-10-02

## 2023-10-02 RX ORDER — ALPRAZOLAM 0.25 MG
0.25 TABLET ORAL DAILY PRN
Qty: 30 TABLET | Refills: 3 | Status: SHIPPED | OUTPATIENT
Start: 2023-10-02

## 2023-10-02 RX ORDER — HYDROXYZINE HYDROCHLORIDE 25 MG/1
50 TABLET, FILM COATED ORAL
Qty: 90 TABLET | Refills: 3 | Status: SHIPPED | OUTPATIENT
Start: 2023-10-02 | End: 2024-04-18

## 2023-10-02 RX ORDER — SERTRALINE HYDROCHLORIDE 100 MG/1
200 TABLET, FILM COATED ORAL DAILY
Qty: 90 TABLET | Refills: 3 | Status: SHIPPED | OUTPATIENT
Start: 2023-10-02 | End: 2024-04-01

## 2023-10-02 ASSESSMENT — ENCOUNTER SYMPTOMS
NERVOUS/ANXIOUS: 1
HEMATOCHEZIA: 0
NAUSEA: 0
DYSURIA: 0
HEADACHES: 0
DIARRHEA: 0
HEARTBURN: 0
CHILLS: 0
ABDOMINAL PAIN: 0
MYALGIAS: 0
PALPITATIONS: 0
WEAKNESS: 0
COUGH: 1
ARTHRALGIAS: 0
FREQUENCY: 0
JOINT SWELLING: 0
EYE PAIN: 0
CONSTIPATION: 0
SHORTNESS OF BREATH: 0
BREAST MASS: 0
SORE THROAT: 0
HEMATURIA: 0
DIZZINESS: 0
PARESTHESIAS: 0
FEVER: 0

## 2023-10-02 ASSESSMENT — ANXIETY QUESTIONNAIRES
5. BEING SO RESTLESS THAT IT IS HARD TO SIT STILL: NOT AT ALL
6. BECOMING EASILY ANNOYED OR IRRITABLE: MORE THAN HALF THE DAYS
1. FEELING NERVOUS, ANXIOUS, OR ON EDGE: NEARLY EVERY DAY
GAD7 TOTAL SCORE: 13
7. FEELING AFRAID AS IF SOMETHING AWFUL MIGHT HAPPEN: SEVERAL DAYS
2. NOT BEING ABLE TO STOP OR CONTROL WORRYING: NEARLY EVERY DAY
3. WORRYING TOO MUCH ABOUT DIFFERENT THINGS: NEARLY EVERY DAY
GAD7 TOTAL SCORE: 13
IF YOU CHECKED OFF ANY PROBLEMS ON THIS QUESTIONNAIRE, HOW DIFFICULT HAVE THESE PROBLEMS MADE IT FOR YOU TO DO YOUR WORK, TAKE CARE OF THINGS AT HOME, OR GET ALONG WITH OTHER PEOPLE: NOT DIFFICULT AT ALL
4. TROUBLE RELAXING: SEVERAL DAYS

## 2023-10-02 ASSESSMENT — PATIENT HEALTH QUESTIONNAIRE - PHQ9
10. IF YOU CHECKED OFF ANY PROBLEMS, HOW DIFFICULT HAVE THESE PROBLEMS MADE IT FOR YOU TO DO YOUR WORK, TAKE CARE OF THINGS AT HOME, OR GET ALONG WITH OTHER PEOPLE: NOT DIFFICULT AT ALL
SUM OF ALL RESPONSES TO PHQ QUESTIONS 1-9: 7
SUM OF ALL RESPONSES TO PHQ QUESTIONS 1-9: 7

## 2023-10-02 NOTE — NURSING NOTE
Prior to immunization administration, verified patients identity using patient s name and date of birth. Please see Immunization Activity for additional information.     Screening Questionnaire for Adult Immunization    Are you sick today?   No   Do you have allergies to medications, food, a vaccine component or latex?   No   Have you ever had a serious reaction after receiving a vaccination?   No   Do you have a long-term health problem with heart, lung, kidney, or metabolic disease (e.g., diabetes), asthma, a blood disorder, no spleen, complement component deficiency, a cochlear implant, or a spinal fluid leak?  Are you on long-term aspirin therapy?   No   Do you have cancer, leukemia, HIV/AIDS, or any other immune system problem?   No   Do you have a parent, brother, or sister with an immune system problem?   No   In the past 3 months, have you taken medications that affect  your immune system, such as prednisone, other steroids, or anticancer drugs; drugs for the treatment of rheumatoid arthritis, Crohn s disease, or psoriasis; or have you had radiation treatments?   No   Have you had a seizure, or a brain or other nervous system problem?   No   During the past year, have you received a transfusion of blood or blood    products, or been given immune (gamma) globulin or antiviral drug?   No   For women: Are you pregnant or is there a chance you could become       pregnant during the next month?   No   Have you received any vaccinations in the past 4 weeks?   No     Immunization questionnaire answers were all negative.        Per orders of REBEKAH Henry, FNP-BC , injection of flu, hep b, covid given by Libra Martel MA. Patient instructed to remain in clinic for 15 minutes afterwards, and to report any adverse reaction to me immediately.       Screening performed by Libra Martel MA on 10/2/2023 at 11:53 AM.

## 2023-10-02 NOTE — PROGRESS NOTES
SUBJECTIVE:   CC: Zaida is an 48 year old who presents for preventive health visit.       10/2/2023    11:03 AM   Additional Questions   Roomed by HEATHER   Accompanied by THERESA         10/2/2023    11:03 AM   Patient Reported Additional Medications   Patient reports taking the following new medications None per patient       Healthy Habits:     Getting at least 3 servings of Calcium per day:  Yes    Bi-annual eye exam:  NO    Dental care twice a year:  Yes    Sleep apnea or symptoms of sleep apnea:  Sleep apnea    Diet:  Regular (no restrictions)    Frequency of exercise:  4-5 days/week    Duration of exercise:  30-45 minutes    Taking medications regularly:  Yes    Medication side effects:  None    Additional concerns today:  Yes    Patient would still like to discuss the following concern(s):  1. Refill medications - feels zoloft is helping her MN. 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 Kids 6th, 4th- soccer. Going to [place] x 10 days. Loves living in [place], grew up there. Takes xanax prn if needed. Hydroxyzine also helps. Checks BP at home- always < 140/80, denies CP, dizziness, frequent headaches. Walks dog daily.   2. Hair thinning - wondering if thyroid issue. Discussed labs, can try topical Rogaine if needed.     In agreement to flu and hep b vaccines and covid    Today's PHQ-9 Score:       10/2/2023    11:00 AM   PHQ-9 SCORE   PHQ-9 Total Score MyChart 7 (Mild depression)   PHQ-9 Total Score 7         Social History     Tobacco Use    Smoking status: Former     Packs/day: 0.00     Years: 10.00     Pack years: 0.00     Types: Cigarettes     Start date: 1994     Quit date: 2005     Years since quittin.6     Passive exposure: Never    Smokeless tobacco: Never    Tobacco comments:     Social smoker   Substance Use Topics    Alcohol use: Yes     Comment: Socially             2023     6:20 PM   Alcohol Use   Prescreen: >3 drinks/day or >7 drinks/week? No     Reviewed orders with  patient.  Reviewed health maintenance and updated orders accordingly - Yes  Lab work is in process  Labs reviewed in EPIC  BP Readings from Last 3 Encounters:   10/02/23 120/72   22 134/80   22 124/83    Wt Readings from Last 3 Encounters:   10/02/23 115.3 kg (254 lb 3.2 oz)   22 117.9 kg (260 lb)   22 117.9 kg (260 lb)                  Patient Active Problem List   Diagnosis    Anxiety    CARDIOVASCULAR SCREENING; LDL GOAL LESS THAN 160    Seasonal allergies    Kidney stones    Bilateral occipital neuralgia    Nasal polyp    Hypertension, unspecified type    Insomnia, unspecified type    Nasal obstruction    Nasal septal deviation    Nasal turbinate hypertrophy    Adenoid hypertrophy    Chronic tonsillitis    Class 3 severe obesity due to excess calories with serious comorbidity and body mass index (BMI) of 40.0 to 44.9 in adult (H)    Cervical high risk HPV (human papillomavirus) test positive    Recurrent UTI    Menorrhagia with regular cycle    Hx of abnormal cervical Pap smear    Anxiety attack    Adjustment disorder with mixed anxiety and depressed mood    S/P laparoscopic hysterectomy    Family history of malignant neoplasm of breast     Past Surgical History:   Procedure Laterality Date    BIOPSY  2021     SECTION  2011    Procedure: SECTION; Surgeon:CHRISSY BRITTON; [facility]     SECTION  2014    Procedure:  SECTION;  Surgeon: Chrissy Britton MD;  [facility]    CYSTOSCOPY, SLING TRANSVAGINAL N/A 2022    Procedure: CREATION, VAGINAL SLING, WITH CYSTOSCOPY;  Surgeon: Hany Thurston MD;  [facility]    DAVINCI HYSTERECTOMY TOTAL, SALPINGECTOMY BILATERAL N/A 2022    Procedure: HYSTERECTOMY, TOTAL, ROBOT-ASSISTED, WITH BILATERAL SALPINGECTOMY, CYSTOSCOPY;  Surgeon: Shahida Coyne MD;  [facility]    GENITOURINARY SURGERY  2009    Lithotripsy    GYN SURGERY      tubal ligation    HC  TOOTH EXTRACTION W/FORCEP      one dry socket    RW DENTAL (ABSTRACTED)      SEPTOPLASTY, TURBINOPLASTY, COMBINED Bilateral 2020    Procedure: SEPTOPLASTY, BILATERAL INFERIOR TURBINATE REDUCTION;  Surgeon: Jb Nava MD;  Location: [facility]    SURGICAL HISTORY OF -   2009    lithotripsy    TONSILLECTOMY, ADENOIDECTOMY, COMBINED Bilateral 2020    Procedure: BILATERAL TONSILLECTOMY AND ADENOIDECTOMY;  Surgeon: Jb Nava MD;  Location: [facility]       Social History     Tobacco Use    Smoking status: Former     Packs/day: 0.00     Years: 10.00     Pack years: 0.00     Types: Cigarettes     Start date: 1994     Quit date: 2005     Years since quittin.6     Passive exposure: Never    Smokeless tobacco: Never    Tobacco comments:     Social smoker   Substance Use Topics    Alcohol use: Yes     Comment: Socially     Family History   Problem Relation Age of Onset    C.A.D. Paternal Grandfather          of MI    Breast Cancer Maternal Grandmother             Cancer - colorectal Maternal Grandmother     Circulatory Maternal Grandmother         aneurism    Prostate Cancer Maternal Grandfather             Eye Surgery Maternal Grandfather         cataracts, late in life    Eye Disorder Father         detached retina    Genitourinary Problems Father         kidney stones    Macular Degeneration Father     Hypertension Father     Nephrolithiasis Father     Cerebrovascular Disease Paternal Grandmother             Glaucoma No family hx of          Current Outpatient Medications   Medication Sig Dispense Refill    albuterol (PROAIR HFA/PROVENTIL HFA/VENTOLIN HFA) 108 (90 Base) MCG/ACT inhaler Inhale 1-2 puffs into the lungs every 6 hours 6.7 g 5    ALPRAZolam (XANAX) 0.25 MG tablet Take 1 tablet (0.25 mg) by mouth daily as needed for anxiety Do not mix with alcohol or drive after taking 30 tablet 3    fluticasone (FLONASE) 50 MCG/ACT spray Spray 1 spray into  both nostrils daily As needed      hydrOXYzine (ATARAX) 25 MG tablet Take 2 tablets (50 mg) by mouth nightly as needed for anxiety or other (Sleep) 90 tablet 3    levocetirizine (XYZAL) 5 MG tablet Take 1 tablet by mouth daily      lisinopril (ZESTRIL) 5 MG tablet Take 1 tablet (5 mg) by mouth daily 90 tablet 3    sertraline (ZOLOFT) 100 MG tablet Take 2 tablets (200 mg) by mouth daily 90 tablet 3     Allergies   Allergen Reactions    Seasonal Allergies      Cold symptoms, stuffy nose, itchy throat and eyes     Recent Labs   Lab Test 05/02/22  1220 09/24/21  1511 11/13/20  0748 06/30/17  1108   LDL  --   --  125* 97   HDL  --   --  56 55   TRIG  --   --  168* 128   CR 0.69 0.81  --  0.72   GFRESTIMATED >90 89  --  >90  Non  GFR Calc     GFRESTBLACK  --   --   --  >90  African American GFR Calc     POTASSIUM 4.6 4.2  --  4.3   TSH  --   --  1.51  --         Breast Cancer Screening:    FHS-7:       6/3/2022    12:50 PM 9/30/2023     6:22 PM   Breast CA Risk Assessment (FHS-7)   Did any of your first-degree relatives have breast or ovarian cancer? No No   Did any of your relatives have bilateral breast cancer? No Yes   Did any man in your family have breast cancer? No No   Did any woman in your family have breast and ovarian cancer? No No   Did any woman in your family have breast cancer before age 50 y? No No   Do you have 2 or more relatives with breast and/or ovarian cancer? Yes Yes   Do you have 2 or more relatives with breast and/or bowel cancer? Yes No       Mammogram Screening: Recommended annual mammography  Pertinent mammograms are reviewed under the imaging tab.    History of abnormal Pap smear: Status post benign hysterectomy. Health Maintenance and Surgical History updated.      Latest Ref Rng & Units 9/24/2021     2:59 PM 11/13/2020     7:34 AM 11/13/2020     7:20 AM   PAP / HPV   PAP  Negative for Intraepithelial Lesion or Malignancy (NILM)      PAP (Historical)   NIL     HPV 16 DNA  Negative Negative   Negative    HPV 18 DNA Negative Negative   Negative    Other HR HPV Negative Negative   Positive      Reviewed and updated as needed this visit by clinical staff   Tobacco  Allergies  Meds  Problems  Med Hx  Surg Hx  Fam Hx  Soc   Hx        Reviewed and updated as needed this visit by Provider   Tobacco  Allergies  Meds  Problems  Med Hx  Surg Hx  Fam Hx  Soc   Hx       Past Medical History:   Diagnosis Date    Cervical high risk HPV (human papillomavirus) test positive 2020    Depressive disorder     I take Lexapro    HTN (hypertension)     Human papillomavirus in conditions classified elsewhere and of unspecified site     genital warts , resolved    Mild or unspecified pre-eclampsia, antepartum     LOC (obstructive sleep apnea)     Pre-eclampsia     Seasonal allergies     Zyrtec helps and has an Rx for Flonase      Past Surgical History:   Procedure Laterality Date    BIOPSY  2021     SECTION  2011    Procedure: SECTION; Surgeon:CHRISSY GAFFNEY; [facility]     SECTION  2014    Procedure:  SECTION;  Surgeon: Chrissy Gaffney MD;  [facility]    CYSTOSCOPY, SLING TRANSVAGINAL N/A 2022    Procedure: CREATION, VAGINAL SLING, WITH CYSTOSCOPY;  Surgeon: Hany Thurston MD;  [facility]    DAVINCI HYSTERECTOMY TOTAL, SALPINGECTOMY BILATERAL N/A 2022    Procedure: HYSTERECTOMY, TOTAL, ROBOT-ASSISTED, WITH BILATERAL SALPINGECTOMY, CYSTOSCOPY;  Surgeon: Shahida Coyne MD;  [facility]    GENITOURINARY SURGERY  2009    Lithotripsy    GYN SURGERY      tubal ligation    HC TOOTH EXTRACTION W/FORCEP      one dry socket    RW DENTAL (ABSTRACTED)      SEPTOPLASTY, TURBINOPLASTY, COMBINED Bilateral 2020    Procedure: SEPTOPLASTY, BILATERAL INFERIOR TURBINATE REDUCTION;  Surgeon: Jb Nava MD;  Location:  OR    SURGICAL HISTORY OF -   2009    lithotripsy  "   TONSILLECTOMY, ADENOIDECTOMY, COMBINED Bilateral 2020    Procedure: BILATERAL TONSILLECTOMY AND ADENOIDECTOMY;  Surgeon: Jb Nava MD;  [facility]     OB History    Para Term  AB Living   2 2 2 0 0 2   SAB IAB Ectopic Multiple Live Births   0 0 0 0 2      # Outcome Date GA Lbr Hema/2nd Weight Sex Delivery Anes PTL Lv   2 Term 14 37w1d  3.26 kg (7 lb 3 oz) F   N SAMANTHA      Birth Comments: Passed hearing and oximetry screens    Hepatitis B vaccine    Bilirubin fine in hospital - high [facility]      Name: ROBERT SWANSON1 ISELA HERNANDEZ      Apgar1: 8  Apgar5: 8   1 Term 11 38w1d  3.118 kg (6 lb 14 oz) M CS-LTranv  N SAMANTHA      Name: ALEX HERNÁNDEZ      Apgar1: 9  Apgar5: 9       Review of Systems   Constitutional:  Negative for chills and fever.   HENT:  Negative for congestion, ear pain, hearing loss and sore throat.    Eyes:  Positive for visual disturbance. Negative for pain.   Respiratory:  Positive for cough. Negative for shortness of breath.    Cardiovascular:  Negative for chest pain, palpitations and peripheral edema.   Gastrointestinal:  Negative for abdominal pain, constipation, diarrhea, heartburn, hematochezia and nausea.   Breasts:  Negative for tenderness, breast mass and discharge.   Genitourinary:  Negative for dysuria, frequency, genital sores, hematuria, pelvic pain, urgency, vaginal bleeding and vaginal discharge.   Musculoskeletal:  Negative for arthralgias, joint swelling and myalgias.   Skin:  Negative for rash.   Neurological:  Negative for dizziness, weakness, headaches and paresthesias.   Psychiatric/Behavioral:  Positive for mood changes. The patient is nervous/anxious.           OBJECTIVE:   /72   Pulse 93   Temp 97  F (36.1  C) (Temporal)   Resp 20   Ht 1.676 m (5' 6\")   Wt 115.3 kg (254 lb 3.2 oz)   LMP 2022 (Exact Date)   SpO2 99%   BMI 41.03 kg/m    Physical Exam  GENERAL APPEARANCE: healthy, alert and no distress  EYES: Eyes grossly " normal to inspection, PERRL and conjunctivae and sclerae normal; due for eye exam  HENT: ear canals and TM's normal, nose and mouth without ulcers or lesions, oropharynx clear and oral mucous membranes moist  NECK: no adenopathy, no asymmetry, masses, or scars and thyroid normal to palpation  RESP: lungs clear to auscultation - no rales, rhonchi or wheezes  BREAST: Deferred per guidelines-asymptomatic per patient.  Due for mammogram discussed  CV: regular rate and rhythm, normal S1 S2, no S3 or S4, no murmur, click or rub, no peripheral edema and peripheral pulses strong  ABDOMEN: soft, nontender, no hepatosplenomegaly, no masses and bowel sounds normal   (female): Deferred per patient-no concerns.  MS: no musculoskeletal defects are noted and gait is age appropriate without ataxia, no CVA tenderness  SKIN: no suspicious lesions or rashes; sees dermatology annually  NEURO: Normal strength and tone, cranial nerves intact, sensory exam grossly normal, mentation intact and speech normal  PSYCH: mentation appears normal and affect normal/bright    Diagnostic Test Results:  Labs reviewed in Epic  See orders    ASSESSMENT/PLAN:       ICD-10-CM    1. Routine general medical examination at a health care facility  Z00.00       2. Wheezing  R06.2 albuterol (PROAIR HFA/PROVENTIL HFA/VENTOLIN HFA) 108 (90 Base) MCG/ACT inhaler      3. Anxiety attack  F41.0 ALPRAZolam (XANAX) 0.25 MG tablet      4. Anxiety  F41.9 hydrOXYzine (ATARAX) 25 MG tablet      5. Persistent insomnia  G47.00 hydrOXYzine (ATARAX) 25 MG tablet      6. Benign essential HTN  I10 BASIC METABOLIC PANEL     lisinopril (ZESTRIL) 5 MG tablet     BASIC METABOLIC PANEL      7. Adjustment disorder with mixed anxiety and depressed mood  F43.23 sertraline (ZOLOFT) 100 MG tablet      8. Visit for screening mammogram  Z12.31 MA SCREENING DIGITAL BILAT - Future  (s+30)      9. Screening for thyroid disorder  Z13.29 TSH with free T4 reflex     Thyroid peroxidase antibody  "    T3, total     T3, Free     T3, Free     T3, total     Thyroid peroxidase antibody     TSH with free T4 reflex      10. Screening cholesterol level  Z13.220 Lipid panel reflex to direct LDL Fasting     Lipid panel reflex to direct LDL Fasting      11. Screening for diabetes mellitus  Z13.1 BASIC METABOLIC PANEL     BASIC METABOLIC PANEL      12. Hair thinning  L65.9 TSH with free T4 reflex     Thyroid peroxidase antibody     T3, total     T3, Free     T3, Free     T3, total     Thyroid peroxidase antibody     TSH with free T4 reflex      13. Family history of malignant neoplasm of breast  Z80.3       14. Class 3 severe obesity due to excess calories with serious comorbidity and body mass index (BMI) of 40.0 to 44.9 in adult (H)  E66.01     Z68.41           Patient has been advised of split billing requirements and indicates understanding: Yes      COUNSELING:  Reviewed preventive health counseling, as reflected in patient instructions      BMI:   Estimated body mass index is 41.03 kg/m  as calculated from the following:    Height as of this encounter: 1.676 m (5' 6\").    Weight as of this encounter: 115.3 kg (254 lb 3.2 oz).   Weight management plan: Discussed healthy diet and exercise guidelines  She walks daily.  Discussed regular exercise, diet changes, weight loss will help prevent chronic disease such as diabetes and high cholesterol.    She reports that she quit smoking about 18 years ago. Her smoking use included cigarettes. She started smoking about 29 years ago. She has never been exposed to tobacco smoke. She has never used smokeless tobacco.          LAWANDA PATEL, SOPHIE  [facility]  "

## 2023-10-02 NOTE — PATIENT INSTRUCTIONS
Preventive Health Recommendations  Female Ages 40 to 49    Yearly exam:   See your health care provider every year in order to  Review health changes.   Discuss preventive care.    Review your medicines if your doctor prescribed any.    Get a Pap test every three years (unless you have an abnormal result and your provider advises testing more often).    If you get Pap tests with HPV test, you only need to test every 5 years, unless you have an abnormal result. You do not need a Pap test if your uterus was removed (hysterectomy) and you have not had cancer.    You should be tested each year for STDs (sexually transmitted diseases), if you're at risk.   Ask your doctor if you should have a mammogram.    Have a colonoscopy (test for colon cancer) beginning at age 45.  Ask your provider about other options like a yearly FIT test or Cologuard test every 3 years (stool tests)      Have a cholesterol test every 5 years.     Have a diabetes test (fasting glucose) after age 45. If you are at risk for diabetes, you should have this test every 3 years.    Shots: Get a flu shot each year. Get a tetanus shot every 10 years.     Nutrition:   Eat at least 5 servings of fruits and vegetables each day.  Eat whole-grain bread, whole-wheat pasta and brown rice instead of white grains and rice.  Get adequate Calcium and Vitamin D.      Lifestyle  Exercise at least 150 minutes a week (an average of 30 minutes a day, 5 days a week). This will help you control your weight and prevent disease.  Limit alcohol to one drink per day.  No smoking.   Wear sunscreen to prevent skin cancer.  See your dentist every six months for an exam and cleaning.

## 2023-10-03 LAB
ANION GAP SERPL CALCULATED.3IONS-SCNC: 14 MMOL/L (ref 7–15)
BUN SERPL-MCNC: 10.3 MG/DL (ref 6–20)
CALCIUM SERPL-MCNC: 9.9 MG/DL (ref 8.6–10)
CHLORIDE SERPL-SCNC: 101 MMOL/L (ref 98–107)
CHOLEST SERPL-MCNC: 233 MG/DL
CREAT SERPL-MCNC: 0.65 MG/DL (ref 0.51–0.95)
DEPRECATED HCO3 PLAS-SCNC: 24 MMOL/L (ref 22–29)
EGFRCR SERPLBLD CKD-EPI 2021: >90 ML/MIN/1.73M2
GLUCOSE SERPL-MCNC: 94 MG/DL (ref 70–99)
HDLC SERPL-MCNC: 51 MG/DL
LDLC SERPL CALC-MCNC: 131 MG/DL
NONHDLC SERPL-MCNC: 182 MG/DL
POTASSIUM SERPL-SCNC: 4.9 MMOL/L (ref 3.4–5.3)
SODIUM SERPL-SCNC: 139 MMOL/L (ref 135–145)
T3 SERPL-MCNC: 108 NG/DL (ref 85–202)
T3FREE SERPL-MCNC: 3.3 PG/ML (ref 2–4.4)
THYROPEROXIDASE AB SERPL-ACNC: <10 IU/ML
TRIGL SERPL-MCNC: 253 MG/DL
TSH SERPL DL<=0.005 MIU/L-ACNC: 1.26 UIU/ML (ref 0.3–4.2)

## 2023-10-03 NOTE — RESULT ENCOUNTER NOTE
Dewey Cerda,    Thank you for your recent office visit.    Here are your recent results.  Normal thyroid function, normal kidney function.  Normal glucose you are not diabetic.  For a nondiabetic your cholesterol is considered normal.  Continue to work on daily exercise, weight loss.  Consider taking a daily fish oil supplement to help reduce your triglycerides.    Feel free to contact me via [facility] or call the clinic at [phone].    Sincerely,    REBEKAH Orozco, FNP-BC

## 2023-10-30 ENCOUNTER — ANCILLARY PROCEDURE (OUTPATIENT)
Dept: MAMMOGRAPHY | Facility: HOSPITAL | Age: 48
End: 2023-10-30
Attending: NURSE PRACTITIONER
Payer: COMMERCIAL

## 2023-10-30 DIAGNOSIS — Z12.31 VISIT FOR SCREENING MAMMOGRAM: ICD-10-CM

## 2023-10-30 PROCEDURE — 77067 SCR MAMMO BI INCL CAD: CPT

## 2023-11-10 ENCOUNTER — ANCILLARY PROCEDURE (OUTPATIENT)
Dept: MAMMOGRAPHY | Facility: CLINIC | Age: 48
End: 2023-11-10
Attending: NURSE PRACTITIONER
Payer: COMMERCIAL

## 2023-11-10 DIAGNOSIS — N64.89 BREAST ASYMMETRY: ICD-10-CM

## 2023-11-10 PROCEDURE — 77061 BREAST TOMOSYNTHESIS UNI: CPT | Mod: RT

## 2023-11-10 PROCEDURE — 76642 ULTRASOUND BREAST LIMITED: CPT | Mod: RT

## 2023-11-12 DIAGNOSIS — G47.00 PERSISTENT INSOMNIA: ICD-10-CM

## 2023-11-12 DIAGNOSIS — F41.9 ANXIETY: ICD-10-CM

## 2023-11-13 RX ORDER — HYDROXYZINE HYDROCHLORIDE 25 MG/1
50 TABLET, FILM COATED ORAL
Qty: 180 TABLET | Refills: 2 | OUTPATIENT
Start: 2023-11-13

## 2023-12-01 NOTE — LETTER
3/9/2019         RE: Zaida Lao  [address]   [place] [address]        Dear Colleague,    Thank you for referring your patient, Zaida Lao, to the [facility]. Please see a copy of my visit note below.    Sports Medicine Clinic Visit    PCP: Elzbieta Pulido    Zaida Lao is a 44 year old female who is seen  as a self referral presenting with right wrist pain. Right hand dominant    Injury: slipped on a hill and caught herself with her wrist  **  Hx tennis elbow ~8 years ago, did NTG patch.    Pain at first CMC joint, radial wrist. That happens from time to time.  After falling, noted wrist pain; that has improved somewhat.  More recently with ongoing use of right UE, notes small finger numbness.  Noted tenderness in dorsal wrist, near DRUJ, and with pressure there gets pain into forearm.  Mostly thumb and small finger that go numb. Nighttime wrist bracing helps with some numbness. However, with return to activity notes more pain.  Worse past couple weeks.      Location of Pain: right wrist to forearm pain along ulna  Duration of Pain: 2 month(s)  Rating of Pain at worst: 7/10  Rating of Pain Currently: 5/10  Symptoms are better with: Tylenol, bracing at night  Symptoms are worse with: movement, pronation, deviation  Additional Features:   Positive: instability, numbness and weakness   Negative: swelling, bruising, popping, grinding, catching, locking, paresthesias and pain in other joints  Other evaluation and/or treatments so far consists of: Ice, Rest, brace and Advil  Prior History of related problems: tennis elbow, overuse    Social History: dental hygienist     Review of Systems  Musculoskeletal: as above  Remainder of review of systems is negative including constitutional, CV, pulmonary, GI, Skin and Neurologic except as noted in HPI or medical history.    Past Medical History:   Diagnosis Date     Human papillomavirus in  conditions classified elsewhere and of unspecified site     genital warts , resolved     Seasonal allergies     Zyrtec helps and has an Rx for Flonase     Past Surgical History:   Procedure Laterality Date      SECTION  2011    Procedure: SECTION; Surgeon:CHRISSY BRITTON; Location:[facility]      SECTION  2014    Procedure:  SECTION;  Surgeon: Chrissy Britton MD;  Location: [facility]     GYN SURGERY      tubal ligation     HC TOOTH EXTRACTION W/FORCEP      one dry socket     RW DENTAL (ABSTRACTED)       SURGICAL HISTORY OF -       lithotripsy     Family History   Problem Relation Age of Onset     C.A.D. Paternal Grandfather          of MI     Breast Cancer Maternal Grandmother      Cancer - colorectal Maternal Grandmother      Circulatory Maternal Grandmother         aneurism     Prostate Cancer Maternal Grandfather      Eye Surgery Maternal Grandfather         cataracts, late in life     Eye Disorder Father         detached retina     Genitourinary Problems Father         kidney stones     Macular Degeneration Father      Cerebrovascular Disease Paternal Grandmother      Glaucoma No family hx of      Social History     Socioeconomic History     Marital status:      Spouse name: Not on file     Number of children: Not on file     Years of education: Not on file     Highest education level: Not on file   Occupational History     Occupation: student     Employer: [employer]     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   Social Needs     Financial resource strain: Not on file     Food insecurity:     Worry: Not on file     Inability: Not on file     Transportation needs:     Medical: Not on file     Non-medical: Not on file   Tobacco Use     Smoking status: Former Smoker     Types: Cigarettes     Smokeless tobacco: Never Used   Substance and Sexual Activity     Alcohol use: No     Drug use: No     Sexual activity: Yes     Partners: Male     Birth  "control/protection: Surgical     Comment: mirena IUD 10/25/11   Lifestyle     Physical activity:     Days per week: Not on file     Minutes per session: Not on file     Stress: Not on file   Relationships     Social connections:     Talks on phone: Not on file     Gets together: Not on file     Attends [employer] service: Not on file     Active member of club or organization: Not on file     Attends meetings of clubs or organizations: Not on file     Relationship status: Not on file     Intimate partner violence:     Fear of current or ex partner: Not on file     Emotionally abused: Not on file     Physically abused: Not on file     Forced sexual activity: Not on file   Other Topics Concern     Parent/sibling w/ CABG, MI or angioplasty before 65F 55M? Not Asked   Social History Narrative    Caffeine intake/servings daily - 0    Calcium intake/servings daily - 3    Exercise 5 times weekly - describe gym    Sunscreen used - Yes    Seatbelts used - Yes    Guns stored in the home - No    Self Breast Exam - Yes    Pap test up to date -  Yes    Eye exam up to date -  Yes    Dental exam up to date -  Yes    DEXA scan up to date -  No    Flex Sig/Colonoscopy up to date -  No    Mammography up to date -  No    Immunizations reviewed and up to date - Yes    Abuse: Current or Past (Physical, Sexual or Emotional) - No    Do you feel safe in your environment - Yes    Do you cope well with stress - Yes    Do you suffer from insomnia - No    Last updated by: Radha Mendoza  12/18/2013                               Objective  BP (!) 131/92 (BP Location: Left arm, Patient Position: Chair, Cuff Size: Adult Large)   Pulse 82   Ht 1.689 m (5' 6.5\")   Wt 112 kg (247 lb)   BMI 39.27 kg/m       GENERAL APPEARANCE: healthy, alert and no distress   GAIT: NORMAL  SKIN: no suspicious lesions or rashes  NEURO: Normal strength and tone, mentation intact and speech normal  PSYCH:  mentation appears normal and affect " normal/bright  HEENT: no scleral icterus  CV: no extremity edema  RESP: nonlabored breathing    Exam:    Elbow (right):  No edema, no ecchymosis noted.  Range of motion: flexion: full    extension: full    forearm supination: full    forearm pronation: full elbow at side; when elbow extended, mildly limited  Strength testing: flexion:     extension:     forearm supination:     forearm pronation:    Pain with resisted rotation  Palpation: Tender medial elbow, cubital tunnel  Regional pulses normal. Capillary refill brisk.  Tinel positive cubital tunnel      Hand/wrist (right):  Inspection:  No deformity noted.  Question minimal ulnar wrist swelling. No ecchymosis.    Motion:  Wrist flexion   Wrist extension   Wrist radial deviation   Wrist ulnar deviation   Digit motion   Grossly intact      Radial pulses normal, +2/4, capillary refill brisk.    Palpation:  Tender first CMC joint, thenar eminence; over ulnar styloid; mild ulnar fovea      Tinel neg.    Finkelstein mild pain.  Thumb grind mild pain      Skin:       well perfused       capillary refill brisk    **  Spurling to left mild right elbow area pain; question mild right small finger numbness  Spurling right neg      Radiology:  Visualized radiographs of right wrist obtained today, and reviewed the images with the patient.  Impression: Three views of the right wrist demonstrate no acute osseous abnormality.      Assessment:  1. Right wrist pain    2. Numbness and tingling in right hand         Plan:  Discussed the assessment with the patient.  Plan below.  In addition to plan below, future considerations may be imaging of the wrist, additional support, electrodiagnostic testing, use of anti-inflammatory medication.  Follow up: 1 month if not improving with therapy, sooner if needed.  Questions answered. The patient indicates understanding of these issues and agrees with the plan.    Melvin Maurer DO, CAQ        Patient Instructions   Zaida to follow up with  Primary Care provider regarding elevated blood pressure.    Findings consistent with some ulnar nerve irritation. For this, we discussed therapy and using a nighttime towel roll.    For the forearm and wrist, fits with overuse, though acute wrist injury is also a consideration.  May continue with bracing for comfort. May use compression such as tubigrip as well.    Start with therapy, monitor course over the next 1 month.        Disclaimer: This note consists of symbols derived from keyboarding, dictation and/or voice recognition software. As a result, there may be errors in the script that have gone undetected. Please consider this when interpreting information found in this chart.        Again, thank you for allowing me to participate in the care of your patient.        Sincerely,        Melvin Maurer, DO     No cyanosis, no pallor, no jaundice, no rash

## 2023-12-28 NOTE — LETTER
May 14, 2020    Zaida Lao  [address] [facility] [address]    Dear Zaida,       We recently received a refill request for Escitalopram (LEXAPRO).  We have refilled this for a one time supply only because you are due for a:    Virtual visit by way of a Phone, Video or an E-visit.      Please call at your earliest convenience so that there will not be a delay with your future refills.          Thank you,   Your [facility]  [phone]        
(V5) oriented

## 2024-01-18 NOTE — PATIENT INSTRUCTIONS
Reminders:     Please remember to arrive 5-10 minutes early for your appointments. If you are late you may need to reschedule your appointment.    If you have mychart please be aware your results and communications will be sent within your mychart. Unless results are critical and/or urgent value.       Preventive Health Recommendations  Female Ages 40 to 49    Yearly exam:     See your health care provider every year in order to  1. Review health changes.   2. Discuss preventive care.    3. Review your medicines if your doctor prescribed any.      Get a Pap test every three years (unless you have an abnormal result and your provider advises testing more often).      If you get Pap tests with HPV test, you only need to test every 5 years, unless you have an abnormal result. You do not need a Pap test if your uterus was removed (hysterectomy) and you have not had cancer.      You should be tested each year for STDs (sexually transmitted diseases), if you're at risk.     Ask your doctor if you should have a mammogram.      Have a colonoscopy (test for colon cancer) if someone in your family has had colon cancer or polyps before age 50.       Have a cholesterol test every 5 years.       Have a diabetes test (fasting glucose) after age 45. If you are at risk for diabetes, you should have this test every 3 years.    Shots: Get a flu shot each year. Get a tetanus shot every 10 years.     Nutrition:     Eat at least 5 servings of fruits and vegetables each day.    Eat whole-grain bread, whole-wheat pasta and brown rice instead of white grains and rice.    Get adequate Calcium and Vitamin D.      Lifestyle    Exercise at least 150 minutes a week (an average of 30 minutes a day, 5 days a week). This will help you control your weight and prevent disease.    Limit alcohol to one drink per day.    No smoking.     Wear sunscreen to prevent skin cancer.    See your dentist every six months for an exam and cleaning.   Family

## 2024-03-30 DIAGNOSIS — F43.23 ADJUSTMENT DISORDER WITH MIXED ANXIETY AND DEPRESSED MOOD: ICD-10-CM

## 2024-04-01 RX ORDER — SERTRALINE HYDROCHLORIDE 100 MG/1
200 TABLET, FILM COATED ORAL DAILY
Qty: 180 TABLET | Refills: 1 | Status: SHIPPED | OUTPATIENT
Start: 2024-04-01 | End: 2024-09-30

## 2024-04-15 ENCOUNTER — MYC MEDICAL ADVICE (OUTPATIENT)
Dept: FAMILY MEDICINE | Facility: CLINIC | Age: 49
End: 2024-04-15
Payer: COMMERCIAL

## 2024-04-18 DIAGNOSIS — G47.00 PERSISTENT INSOMNIA: ICD-10-CM

## 2024-04-18 DIAGNOSIS — F41.9 ANXIETY: ICD-10-CM

## 2024-04-18 RX ORDER — HYDROXYZINE HYDROCHLORIDE 25 MG/1
50 TABLET, FILM COATED ORAL
Qty: 90 TABLET | Refills: 1 | Status: SHIPPED | OUTPATIENT
Start: 2024-04-18 | End: 2024-07-25

## 2024-07-23 ENCOUNTER — MYC MEDICAL ADVICE (OUTPATIENT)
Dept: FAMILY MEDICINE | Facility: CLINIC | Age: 49
End: 2024-07-23
Payer: COMMERCIAL

## 2024-07-25 DIAGNOSIS — F41.9 ANXIETY: ICD-10-CM

## 2024-07-25 DIAGNOSIS — G47.00 PERSISTENT INSOMNIA: ICD-10-CM

## 2024-07-25 RX ORDER — HYDROXYZINE HYDROCHLORIDE 25 MG/1
TABLET, FILM COATED ORAL
Qty: 180 TABLET | Refills: 0 | Status: SHIPPED | OUTPATIENT
Start: 2024-07-25

## 2024-09-24 ENCOUNTER — PATIENT OUTREACH (OUTPATIENT)
Dept: CARE COORDINATION | Facility: CLINIC | Age: 49
End: 2024-09-24
Payer: COMMERCIAL

## 2024-09-28 DIAGNOSIS — F43.23 ADJUSTMENT DISORDER WITH MIXED ANXIETY AND DEPRESSED MOOD: ICD-10-CM

## 2024-09-28 DIAGNOSIS — I10 BENIGN ESSENTIAL HTN: ICD-10-CM

## 2024-09-30 RX ORDER — SERTRALINE HYDROCHLORIDE 100 MG/1
200 TABLET, FILM COATED ORAL DAILY
Qty: 180 TABLET | Refills: 0 | Status: SHIPPED | OUTPATIENT
Start: 2024-09-30

## 2024-09-30 RX ORDER — LISINOPRIL 5 MG/1
5 TABLET ORAL DAILY
Qty: 90 TABLET | Refills: 0 | Status: SHIPPED | OUTPATIENT
Start: 2024-09-30

## 2024-10-08 ENCOUNTER — PATIENT OUTREACH (OUTPATIENT)
Dept: CARE COORDINATION | Facility: CLINIC | Age: 49
End: 2024-10-08
Payer: COMMERCIAL

## 2024-10-21 ENCOUNTER — MYC MEDICAL ADVICE (OUTPATIENT)
Dept: FAMILY MEDICINE | Facility: CLINIC | Age: 49
End: 2024-10-21
Payer: COMMERCIAL

## 2024-10-21 DIAGNOSIS — Z12.11 SPECIAL SCREENING FOR MALIGNANT NEOPLASMS, COLON: Primary | ICD-10-CM

## 2024-11-06 ENCOUNTER — TELEPHONE (OUTPATIENT)
Dept: GASTROENTEROLOGY | Facility: CLINIC | Age: 49
End: 2024-11-06
Payer: COMMERCIAL

## 2024-11-06 NOTE — TELEPHONE ENCOUNTER
"Endoscopy Scheduling Screen    Have you had any respiratory illness or flu-like symptoms in the last 10 days?  No    What is your communication preference for Instructions and/or Bowel Prep?   MyChart    What insurance is in the chart?  Other:  [employer]    Ordering/Referring Provider:   LAWANDA PATEL        (If ordering provider performs procedure, schedule with ordering provider unless otherwise instructed. )    BMI: Estimated body mass index is 41.03 kg/m  as calculated from the following:    Height as of 10/2/23: 1.676 m (5' 6\").    Weight as of 10/2/23: 115.3 kg (254 lb 3.2 oz).     Sedation Ordered  moderate sedation.   If patient BMI > 50 do not schedule in [facility].    If patient BMI > 45 do not schedule at [facility].    Are you taking methadone or Suboxone?  NO, No RN review required.    Have you been diagnosed and are being treated for severe PTSD or severe anxiety?  NO, No RN review required.    Are you taking any prescription medications for pain 3 or more times per week?   NO, No RN review required.    Do you have a history of malignant hyperthermia?  No    (Females) Are you currently pregnant?   No     Have you been diagnosed or told you have pulmonary hypertension?   No    Do you have an LVAD?  No    Have you been told you have moderate to severe sleep apnea?  Yes. Do you use a CPAP? No. (RN Review required for scheduling unless scheduling in Hospital.)     Have you been told you have COPD, asthma, or any other lung disease?  No    Do you have any heart conditions?  No     Have you ever had or are you waiting for an organ transplant?  No. Continue scheduling, no site restrictions.    Have you had a stroke or transient ischemic attack (TIA aka \"mini stroke\" in the last 6 months?   No    Have you been diagnosed with or been told you have cirrhosis of the liver?   No.    Are you currently on dialysis?   No    Do you need assistance transferring?   No    BMI: Estimated body mass index is 41.03 kg/m  as " "calculated from the following:    Height as of 10/2/23: 1.676 m (5' 6\").    Weight as of 10/2/23: 115.3 kg (254 lb 3.2 oz).     Is patients BMI > 40 and scheduling location [facility]?  No    Do you take an injectable or oral medication for weight loss or diabetes (excluding insulin)?  No    Do you take the medication Naltrexone?  No    Do you take blood thinners?  No       Prep   Are you currently on dialysis or do you have chronic kidney disease?  No    Do you have a diagnosis of diabetes?  No    Do you have a diagnosis of cystic fibrosis (CF)?  No    On a regular basis do you go 3 -5 days between bowel movements?  No    BMI > 40?  Yes (Extended Prep)    Preferred Pharmacy:    [facility] [id number] [place] [facility] - [facility] - [address]   [address] [place] [address]  Phone: [phone] Fax: [phone]      Final Scheduling Details     Procedure scheduled  Colonoscopy    Surgeon:  RICHARD     Date of procedure:  12/06/2024     Pre-OP / PAC:   No - Not required for this site.    Location  [facility] - Per exclusion criteria.    Sedation   Moderate Sedation - Per order.      Patient Reminders:   You will receive a call from a Nurse to review instructions and health history.  This assessment must be completed prior to your procedure.  Failure to complete the Nurse assessment may result in the procedure being cancelled.      On the day of your procedure, please designate an adult(s) who can drive you home stay with you for the next 24 hours. The medicines used in the exam will make you sleepy. You will not be able to drive.      You cannot take public transportation, ride share services, or non-medical taxi service without a responsible caregiver.  Medical transport services are allowed with the requirement that a responsible caregiver will receive you at your destination.  We require that drivers and caregivers are confirmed prior to your procedure.  "

## 2024-11-13 SDOH — HEALTH STABILITY: PHYSICAL HEALTH: ON AVERAGE, HOW MANY DAYS PER WEEK DO YOU ENGAGE IN MODERATE TO STRENUOUS EXERCISE (LIKE A BRISK WALK)?: 3 DAYS

## 2024-11-13 SDOH — HEALTH STABILITY: PHYSICAL HEALTH: ON AVERAGE, HOW MANY MINUTES DO YOU ENGAGE IN EXERCISE AT THIS LEVEL?: 30 MIN

## 2024-11-13 ASSESSMENT — SOCIAL DETERMINANTS OF HEALTH (SDOH): HOW OFTEN DO YOU GET TOGETHER WITH FRIENDS OR RELATIVES?: THREE TIMES A WEEK

## 2024-11-18 ENCOUNTER — OFFICE VISIT (OUTPATIENT)
Dept: FAMILY MEDICINE | Facility: CLINIC | Age: 49
End: 2024-11-18
Payer: COMMERCIAL

## 2024-11-18 ENCOUNTER — MYC MEDICAL ADVICE (OUTPATIENT)
Dept: FAMILY MEDICINE | Facility: CLINIC | Age: 49
End: 2024-11-18

## 2024-11-18 VITALS
HEIGHT: 67 IN | RESPIRATION RATE: 18 BRPM | WEIGHT: 263.2 LBS | OXYGEN SATURATION: 96 % | SYSTOLIC BLOOD PRESSURE: 116 MMHG | DIASTOLIC BLOOD PRESSURE: 80 MMHG | TEMPERATURE: 98.1 F | HEART RATE: 88 BPM | BODY MASS INDEX: 41.31 KG/M2

## 2024-11-18 DIAGNOSIS — Z13.29 SCREENING FOR THYROID DISORDER: ICD-10-CM

## 2024-11-18 DIAGNOSIS — E66.01 MORBID OBESITY (H): ICD-10-CM

## 2024-11-18 DIAGNOSIS — F43.23 ADJUSTMENT DISORDER WITH MIXED ANXIETY AND DEPRESSED MOOD: ICD-10-CM

## 2024-11-18 DIAGNOSIS — I10 BENIGN ESSENTIAL HTN: ICD-10-CM

## 2024-11-18 DIAGNOSIS — Z13.220 SCREENING CHOLESTEROL LEVEL: ICD-10-CM

## 2024-11-18 DIAGNOSIS — R63.5 ABNORMAL WEIGHT GAIN: ICD-10-CM

## 2024-11-18 DIAGNOSIS — Z00.00 ROUTINE GENERAL MEDICAL EXAMINATION AT A HEALTH CARE FACILITY: Primary | ICD-10-CM

## 2024-11-18 LAB
ANION GAP SERPL CALCULATED.3IONS-SCNC: 9 MMOL/L (ref 7–15)
BUN SERPL-MCNC: 12.6 MG/DL (ref 6–20)
CALCIUM SERPL-MCNC: 9.4 MG/DL (ref 8.8–10.4)
CHLORIDE SERPL-SCNC: 105 MMOL/L (ref 98–107)
CHOLEST SERPL-MCNC: 257 MG/DL
CREAT SERPL-MCNC: 0.66 MG/DL (ref 0.51–0.95)
EGFRCR SERPLBLD CKD-EPI 2021: >90 ML/MIN/1.73M2
FASTING STATUS PATIENT QL REPORTED: YES
FASTING STATUS PATIENT QL REPORTED: YES
GLUCOSE SERPL-MCNC: 105 MG/DL (ref 70–99)
HCO3 SERPL-SCNC: 27 MMOL/L (ref 22–29)
HDLC SERPL-MCNC: 57 MG/DL
LDLC SERPL CALC-MCNC: 162 MG/DL
NONHDLC SERPL-MCNC: 200 MG/DL
POTASSIUM SERPL-SCNC: 4.3 MMOL/L (ref 3.4–5.3)
SODIUM SERPL-SCNC: 141 MMOL/L (ref 135–145)
TRIGL SERPL-MCNC: 189 MG/DL
TSH SERPL DL<=0.005 MIU/L-ACNC: 1.81 UIU/ML (ref 0.3–4.2)

## 2024-11-18 PROCEDURE — 90471 IMMUNIZATION ADMIN: CPT | Performed by: NURSE PRACTITIONER

## 2024-11-18 PROCEDURE — 80048 BASIC METABOLIC PNL TOTAL CA: CPT | Performed by: NURSE PRACTITIONER

## 2024-11-18 PROCEDURE — 86376 MICROSOMAL ANTIBODY EACH: CPT | Performed by: NURSE PRACTITIONER

## 2024-11-18 PROCEDURE — 80061 LIPID PANEL: CPT | Performed by: NURSE PRACTITIONER

## 2024-11-18 PROCEDURE — 36415 COLL VENOUS BLD VENIPUNCTURE: CPT | Performed by: NURSE PRACTITIONER

## 2024-11-18 PROCEDURE — 84443 ASSAY THYROID STIM HORMONE: CPT | Performed by: NURSE PRACTITIONER

## 2024-11-18 PROCEDURE — 99396 PREV VISIT EST AGE 40-64: CPT | Mod: 25 | Performed by: NURSE PRACTITIONER

## 2024-11-18 PROCEDURE — 90715 TDAP VACCINE 7 YRS/> IM: CPT | Performed by: NURSE PRACTITIONER

## 2024-11-18 PROCEDURE — 99214 OFFICE O/P EST MOD 30 MIN: CPT | Mod: 25 | Performed by: NURSE PRACTITIONER

## 2024-11-18 RX ORDER — LISINOPRIL 5 MG/1
5 TABLET ORAL DAILY
Qty: 90 TABLET | Refills: 3 | Status: SHIPPED | OUTPATIENT
Start: 2024-11-18

## 2024-11-18 RX ORDER — SERTRALINE HYDROCHLORIDE 100 MG/1
200 TABLET, FILM COATED ORAL DAILY
Qty: 180 TABLET | Refills: 3 | Status: SHIPPED | OUTPATIENT
Start: 2024-11-18

## 2024-11-18 ASSESSMENT — ANXIETY QUESTIONNAIRES
GAD7 TOTAL SCORE: 15
GAD7 TOTAL SCORE: 15
6. BECOMING EASILY ANNOYED OR IRRITABLE: NEARLY EVERY DAY
2. NOT BEING ABLE TO STOP OR CONTROL WORRYING: MORE THAN HALF THE DAYS
7. FEELING AFRAID AS IF SOMETHING AWFUL MIGHT HAPPEN: MORE THAN HALF THE DAYS
1. FEELING NERVOUS, ANXIOUS, OR ON EDGE: NEARLY EVERY DAY
7. FEELING AFRAID AS IF SOMETHING AWFUL MIGHT HAPPEN: MORE THAN HALF THE DAYS
3. WORRYING TOO MUCH ABOUT DIFFERENT THINGS: MORE THAN HALF THE DAYS
5. BEING SO RESTLESS THAT IT IS HARD TO SIT STILL: SEVERAL DAYS
IF YOU CHECKED OFF ANY PROBLEMS ON THIS QUESTIONNAIRE, HOW DIFFICULT HAVE THESE PROBLEMS MADE IT FOR YOU TO DO YOUR WORK, TAKE CARE OF THINGS AT HOME, OR GET ALONG WITH OTHER PEOPLE: SOMEWHAT DIFFICULT
GAD7 TOTAL SCORE: 15
4. TROUBLE RELAXING: MORE THAN HALF THE DAYS
8. IF YOU CHECKED OFF ANY PROBLEMS, HOW DIFFICULT HAVE THESE MADE IT FOR YOU TO DO YOUR WORK, TAKE CARE OF THINGS AT HOME, OR GET ALONG WITH OTHER PEOPLE?: SOMEWHAT DIFFICULT

## 2024-11-18 ASSESSMENT — PATIENT HEALTH QUESTIONNAIRE - PHQ9
10. IF YOU CHECKED OFF ANY PROBLEMS, HOW DIFFICULT HAVE THESE PROBLEMS MADE IT FOR YOU TO DO YOUR WORK, TAKE CARE OF THINGS AT HOME, OR GET ALONG WITH OTHER PEOPLE: SOMEWHAT DIFFICULT
SUM OF ALL RESPONSES TO PHQ QUESTIONS 1-9: 10
SUM OF ALL RESPONSES TO PHQ QUESTIONS 1-9: 10

## 2024-11-18 NOTE — PROGRESS NOTES
Preventive Care Visit  [facility] SOPHIE CHAUDHARY, Family Medicine  Nov 18, 2024      Assessment & Plan     Routine general medical examination at a health care facility      Morbid obesity (H)    - CT Coronary Calcium Scan; Future  - tirzepatide-Weight Management (ZEPBOUND) 2.5 MG/0.5ML prefilled pen; Inject 0.5 mLs (2.5 mg) subcutaneously every 7 days.  - tirzepatide-Weight Management (ZEPBOUND) 5 MG/0.5ML prefilled pen; Inject 0.5 mLs (5 mg) subcutaneously every 7 days.  - tirzepatide-Weight Management (ZEPBOUND) 7.5 MG/0.5ML prefilled pen; Inject 0.5 mLs (7.5 mg) subcutaneously every 7 days.  - tirzepatide-Weight Management (ZEPBOUND) 10 MG/0.5ML prefilled pen; Inject 0.5 mLs (10 mg) subcutaneously every 7 days.    Abnormal weight gain    - TSH with free T4 reflex; Future  - Thyroid peroxidase antibody; Future  - CT Coronary Calcium Scan; Future  - tirzepatide-Weight Management (ZEPBOUND) 2.5 MG/0.5ML prefilled pen; Inject 0.5 mLs (2.5 mg) subcutaneously every 7 days.  - tirzepatide-Weight Management (ZEPBOUND) 5 MG/0.5ML prefilled pen; Inject 0.5 mLs (5 mg) subcutaneously every 7 days.  - tirzepatide-Weight Management (ZEPBOUND) 7.5 MG/0.5ML prefilled pen; Inject 0.5 mLs (7.5 mg) subcutaneously every 7 days.  - tirzepatide-Weight Management (ZEPBOUND) 10 MG/0.5ML prefilled pen; Inject 0.5 mLs (10 mg) subcutaneously every 7 days.  - TSH with free T4 reflex  - Thyroid peroxidase antibody    Screening for thyroid disorder    - TSH with free T4 reflex; Future  - Thyroid peroxidase antibody; Future  - TSH with free T4 reflex  - Thyroid peroxidase antibody    Screening cholesterol level    - Lipid panel reflex to direct LDL Fasting; Future  - Lipid panel reflex to direct LDL Fasting    Adjustment disorder with mixed anxiety and depressed mood    - sertraline (ZOLOFT) 100 MG tablet; Take 2 tablets (200 mg) by mouth daily.    Benign essential HTN    - BASIC METABOLIC PANEL; Future  -  "CT Coronary Calcium Scan; Future  - tirzepatide-Weight Management (ZEPBOUND) 2.5 MG/0.5ML prefilled pen; Inject 0.5 mLs (2.5 mg) subcutaneously every 7 days.  - tirzepatide-Weight Management (ZEPBOUND) 5 MG/0.5ML prefilled pen; Inject 0.5 mLs (5 mg) subcutaneously every 7 days.  - tirzepatide-Weight Management (ZEPBOUND) 7.5 MG/0.5ML prefilled pen; Inject 0.5 mLs (7.5 mg) subcutaneously every 7 days.  - tirzepatide-Weight Management (ZEPBOUND) 10 MG/0.5ML prefilled pen; Inject 0.5 mLs (10 mg) subcutaneously every 7 days.  - lisinopril (ZESTRIL) 5 MG tablet; Take 1 tablet (5 mg) by mouth daily.  - BASIC METABOLIC PANEL    Patient has been advised of split billing requirements and indicates understanding: Yes        BMI  Estimated body mass index is 41.8 kg/m  as calculated from the following:    Height as of this encounter: 1.69 m (5' 6.54\").    Weight as of this encounter: 119.4 kg (263 lb 3.2 oz).   Weight management plan: Discussed healthy diet and exercise guidelines wanting to trzepatide for weight, has done noom, diet change and increased exercise and has been increasing in weight. Risks and benefits of medication discussed as well as risks of obesity.      Depression Screening Follow Up        11/18/2024     6:49 AM   PHQ   PHQ-9 Total Score 10    Q9: Thoughts of better off dead/self-harm past 2 weeks Not at all        Patient-reported         11/18/2024     6:49 AM   Last PHQ-9   1.  Little interest or pleasure in doing things 2    2.  Feeling down, depressed, or hopeless 1    3.  Trouble falling or staying asleep, or sleeping too much 0    4.  Feeling tired or having little energy 2    5.  Poor appetite or overeating 3    6.  Feeling bad about yourself 1    7.  Trouble concentrating 1    8.  Moving slowly or restless 0    Q9: Thoughts of better off dead/self-harm past 2 weeks 0    PHQ-9 Total Score 10        Patient-reported         Follow Up Actions Taken  Patient counseled, no additional follow up at this " time.     Counseling  Appropriate preventive services were addressed with this patient via screening, questionnaire, or discussion as appropriate for fall prevention, nutrition, physical activity, Tobacco-use cessation, social engagement, weight loss and cognition.  Checklist reviewing preventive services available has been given to the patient.  Reviewed patient's diet, addressing concerns and/or questions.   She is at risk for lack of exercise and has been provided with information to increase physical activity for the benefit of her well-being.   She is at risk for psychosocial distress and has been provided with information to reduce risk.   The patient's PHQ-9 score is consistent with moderate depression. She was provided with information regarding depression. She reports situational depression r/t election.  Feels medication helps, feels he is managing and has go tood friend/family support. Advised weight loss medication could affect mental health. Speak up right away if.  Feeling worse.     Work on weight loss  Regular exercise  See Patient Instructions    Subjective   Zaida is a 49 year old, presenting for the following:  Physical  Tolerating blood pressure medicine, denies chest pain, dizziness, frequent headaches.  Uses Xanax rarely.  See above about weight and mental health concerns.          11/18/2024     6:53 AM   Additional Questions   Roomed by Kristina BENITEZ          6/3/2022    10:53 AM 10/2/2023    11:01 AM 11/18/2024     6:45 AM   ROMANA-7 SCORE   Total Score 14 (moderate anxiety) 13 (moderate anxiety) 15 (severe anxiety)   Total Score 14    14 13 15        Patient-reported    Multiple values from one day are sorted in reverse-chronological order            6/3/2022    10:53 AM 10/2/2023    11:00 AM 11/18/2024     6:49 AM   PHQ   PHQ-9 Total Score 10 7 10    Q9: Thoughts of better off dead/self-harm past 2 weeks Not at all  Not at all  Not at all        Patient-reported    Prior to immunization  administration, verified patients identity using patient s name and date of birth. Please see Immunization Activity for additional information.     Screening Questionnaire for Adult Immunization    Are you sick today?   No   Do you have allergies to medications, food, a vaccine component or latex?   No   Have you ever had a serious reaction after receiving a vaccination?   No   Do you have a long-term health problem with heart, lung, kidney, or metabolic disease (e.g., diabetes), asthma, a blood disorder, no spleen, complement component deficiency, a cochlear implant, or a spinal fluid leak?  Are you on long-term aspirin therapy?   No   Do you have cancer, leukemia, HIV/AIDS, or any other immune system problem?   No   Do you have a parent, brother, or sister with an immune system problem?   No   In the past 3 months, have you taken medications that affect  your immune system, such as prednisone, other steroids, or anticancer drugs; drugs for the treatment of rheumatoid arthritis, Crohn s disease, or psoriasis; or have you had radiation treatments?   No   Have you had a seizure, or a brain or other nervous system problem?   No   During the past year, have you received a transfusion of blood or blood    products, or been given immune (gamma) globulin or antiviral drug?   No   For women: Are you pregnant or is there a chance you could become       pregnant during the next month?   No   Have you received any vaccinations in the past 4 weeks?   No     Immunization questionnaire answers were all negative.      Patient instructed to remain in clinic for 15 minutes afterwards, and to report any adverse reactions.     Screening performed by Kristina Limon MA on 11/18/2024 at 7:36 AM.     HPI    Wanting screening, labs, coronary artery calcium screening test ordered. Tdap.           11/13/2024   General Health   How would you rate your overall physical health? (!) FAIR   Feel stress (tense, anxious, or unable to sleep) Very  much      (!) STRESS CONCERN      2024   Nutrition   Three or more servings of calcium each day? Yes   Diet: Regular (no restrictions)   How many servings of fruit and vegetables per day? (!) 2-3   How many sweetened beverages each day? 0-1            2024   Exercise   Days per week of moderate/strenous exercise 3 days   Average minutes spent exercising at this level 30 min            2024   Social Factors   Frequency of gathering with friends or relatives Three times a week   Worry food won't last until get money to buy more No   Food not last or not have enough money for food? No   Do you have housing? (Housing is defined as stable permanent housing and does not include staying ouside in a car, in a tent, in an abandoned building, in an overnight shelter, or couch-surfing.) Yes   Are you worried about losing your housing? No   Lack of transportation? No   Unable to get utilities (heat,electricity)? No            2024   Dental   Dentist two times every year? Yes            2024   TB Screening   Were you born outside of the [place]? No          Today's PHQ-9 Score:       2024     6:49 AM   PHQ-9 SCORE   PHQ-9 Total Score MyChart 10 (Moderate depression)   PHQ-9 Total Score 10        Patient-reported         2024   Substance Use   Alcohol more than 3/day or more than 7/wk No   Do you use any other substances recreationally? (!) CANNABIS PRODUCTS        Social History     Tobacco Use    Smoking status: Former     Current packs/day: 0.00     Types: Cigarettes     Start date: 1994     Quit date: 2005     Years since quittin.8     Passive exposure: Never    Smokeless tobacco: Never    Tobacco comments:     Social smoker   Vaping Use    Vaping status: Never Used   Substance Use Topics    Alcohol use: Yes     Comment: Socially    Drug use: Yes     Types: Marijuana     Comment: I occasionally ingest edibles and very occasionally smoke           11/10/2023   LAST FHS-7  RESULTS   1st degree relative breast or ovarian cancer No   Any relative bilateral breast cancer No   Any male have breast cancer No   Any ONE woman have BOTH breast AND ovarian cancer No   Any woman with breast cancer before 50yrs No   2 or more relatives with breast AND/OR ovarian cancer Yes   2 or more relatives with breast AND/OR bowel cancer No           Mammogram Screening - Mammogram every 1-2 years updated in Health Maintenance based on mutual decision making        11/13/2024   STI Screening   New sexual partner(s) since last STI/HIV test? No        History of abnormal Pap smear: Status post hysterectomy with removal of cervix and no history of CIN2 or greater or cervical cancer. Health Maintenance and Surgical History updated.        Latest Ref Rng & Units 9/24/2021     2:59 PM 11/13/2020     7:34 AM 11/13/2020     7:20 AM   PAP / HPV   PAP  Negative for Intraepithelial Lesion or Malignancy (NILM)      PAP (Historical)   NIL     HPV 16 DNA Negative Negative   Negative    HPV 18 DNA Negative Negative   Negative    Other HR HPV Negative Negative   Positive      ASCVD Risk   The 10-year ASCVD risk score (Paco DK, et al., 2019) is: 1.5%    Values used to calculate the score:      Age: 49 years      Sex: Female      Is Non- : No      Diabetic: No      Tobacco smoker: No      Systolic Blood Pressure: 116 mmHg      Is BP treated: Yes      HDL Cholesterol: 51 mg/dL      Total Cholesterol: 233 mg/dL       Reviewed and updated as needed this visit by Provider                    Past Medical History:   Diagnosis Date    Cervical high risk HPV (human papillomavirus) test positive 11/13/2020    Depressive disorder 2000    I take Lexapro    HTN (hypertension)     Human papillomavirus in conditions classified elsewhere and of unspecified site     genital warts 1996, resolved    Mild or unspecified pre-eclampsia, antepartum     LOC (obstructive sleep apnea)     Pre-eclampsia     Seasonal  allergies     Zyrtec helps and has an Rx for Flonase     Past Surgical History:   Procedure Laterality Date    BIOPSY  2021     SECTION  2011    Procedure: SECTION; Surgeon:CHRISSY GAFFNEY; Location:[facility]     SECTION  2014    Procedure:  SECTION;  Surgeon: Chrissy Gaffney MD;  Location: [facility] L[facility]    CYSTOSCOPY, SLING TRANSVAGINAL N/A 2022    Procedure: CREATION, VAGINAL SLING, WITH CYSTOSCOPY;  Surgeon: Hany Thurston MD;  Location: [facility] OR    DAVINCI HYSTERECTOMY TOTAL, SALPINGECTOMY BILATERAL N/A 2022    Procedure: HYSTERECTOMY, TOTAL, ROBOT-ASSISTED, WITH BILATERAL SALPINGECTOMY, CYSTOSCOPY;  Surgeon: Shahida Coyne MD;  Location: [facility] OR    GENITOURINARY SURGERY  2009    Lithotripsy    GYN SURGERY      tubal ligation    HC TOOTH EXTRACTION W/FORCEP      one dry socket    RW DENTAL (ABSTRACTED)      SEPTOPLASTY, TURBINOPLASTY, COMBINED Bilateral 2020    Procedure: SEPTOPLASTY, BILATERAL INFERIOR TURBINATE REDUCTION;  Surgeon: Jb Nava MD;  Location: [facility] OR    SURGICAL HISTORY OF -   2009    lithotripsy    TONSILLECTOMY, ADENOIDECTOMY, COMBINED Bilateral 2020    Procedure: BILATERAL TONSILLECTOMY AND ADENOIDECTOMY;  Surgeon: Jb Nava MD;  Location: [facility] OR     OB History    Para Term  AB Living   2 2 2 0 0 2   SAB IAB Ectopic Multiple Live Births   0 0 0 0 2      # Outcome Date GA Lbr Hema/2nd Weight Sex Type Anes PTL Lv   2 Term 14 37w1d  3.26 kg (7 lb 3 oz) F   N SAMANTHA      Birth Comments: Passed hearing and oximetry screens    Hepatitis B vaccine    Bilirubin fine in hospital - [facility]      Name: KIKI,BABY1 ISELA HERNANDEZ      Apgar1: 8  Apgar5: 8   1 Term 11 38w1d  3.118 kg (6 lb 14 oz) M CS-LTranv  N SAMANTHA      Name: ALEX HERNÁNDEZ      Apgar1: 9  Apgar5: 9     Lab work is in process  Labs reviewed in EPIC  BP Readings from Last 3 Encounters:   24 116/80    10/02/23 120/72   22 134/80    Wt Readings from Last 3 Encounters:   24 119.4 kg (263 lb 3.2 oz)   10/02/23 115.3 kg (254 lb 3.2 oz)   22 117.9 kg (260 lb)                  Patient Active Problem List   Diagnosis    Anxiety    CARDIOVASCULAR SCREENING; LDL GOAL LESS THAN 160    Seasonal allergies    Kidney stones    Bilateral occipital neuralgia    Nasal polyp    Hypertension, unspecified type    Insomnia, unspecified type    Nasal obstruction    Nasal septal deviation    Nasal turbinate hypertrophy    Adenoid hypertrophy    Chronic tonsillitis    Class 3 severe obesity due to excess calories with serious comorbidity and body mass index (BMI) of 40.0 to 44.9 in adult (H)    Cervical high risk HPV (human papillomavirus) test positive    Recurrent UTI    Menorrhagia with regular cycle    Hx of abnormal cervical Pap smear    Anxiety attack    Adjustment disorder with mixed anxiety and depressed mood    S/P laparoscopic hysterectomy    Family history of malignant neoplasm of breast     Past Surgical History:   Procedure Laterality Date    BIOPSY  2021     SECTION  2011    Procedure: SECTION; Surgeon:CHRISSY BRITTON; [facility]     SECTION  2014    Procedure:  SECTION;  Surgeon: Chrissy Britton MD;  [facility]    CYSTOSCOPY, SLING TRANSVAGINAL N/A 2022    Procedure: CREATION, VAGINAL SLING, WITH CYSTOSCOPY;  Surgeon: Hany Thurston MD;  [facility]    DAVINCI HYSTERECTOMY TOTAL, SALPINGECTOMY BILATERAL N/A 2022    Procedure: HYSTERECTOMY, TOTAL, ROBOT-ASSISTED, WITH BILATERAL SALPINGECTOMY, CYSTOSCOPY;  Surgeon: Shahida Coyne MD;  [facility]    GENITOURINARY SURGERY  2009    Lithotripsy    GYN SURGERY      tubal ligation    HC TOOTH EXTRACTION W/FORCEP      one dry socket    RW DENTAL (ABSTRACTED)      SEPTOPLASTY, TURBINOPLASTY, COMBINED Bilateral 2020    Procedure: SEPTOPLASTY,  BILATERAL INFERIOR TURBINATE REDUCTION;  Surgeon: Jb Nava MD;  Location: [facility]    SURGICAL HISTORY OF -   2009    lithotripsy    TONSILLECTOMY, ADENOIDECTOMY, COMBINED Bilateral 2020    Procedure: BILATERAL TONSILLECTOMY AND ADENOIDECTOMY;  Surgeon: Jb Nava MD;  Location: [facility] OR       Social History     Tobacco Use    Smoking status: Former     Current packs/day: 0.00     Types: Cigarettes     Start date: 1994     Quit date: 2005     Years since quittin.8     Passive exposure: Never    Smokeless tobacco: Never    Tobacco comments:     Social smoker   Substance Use Topics    Alcohol use: Yes     Comment: Socially     Family History   Problem Relation Age of Onset    C.A.D. Paternal Grandfather          of MI    Breast Cancer Maternal Grandmother             Cancer - colorectal Maternal Grandmother     Circulatory Maternal Grandmother         aneurism    Prostate Cancer Maternal Grandfather             Eye Surgery Maternal Grandfather         cataracts, late in life    Eye Disorder Father         detached retina    Genitourinary Problems Father         kidney stones    Macular Degeneration Father     Hypertension Father     Nephrolithiasis Father     Cerebrovascular Disease Paternal Grandmother             Glaucoma No family hx of          Current Outpatient Medications   Medication Sig Dispense Refill    ALPRAZolam (XANAX) 0.25 MG tablet Take 1 tablet (0.25 mg) by mouth daily as needed for anxiety Do not mix with alcohol or drive after taking 30 tablet 3    fluticasone (FLONASE) 50 MCG/ACT spray Spray 1 spray into both nostrils daily As needed      levocetirizine (XYZAL) 5 MG tablet Take 1 tablet by mouth daily      lisinopril (ZESTRIL) 5 MG tablet Take 1 tablet (5 mg) by mouth daily. 90 tablet 3    sertraline (ZOLOFT) 100 MG tablet Take 2 tablets (200 mg) by mouth daily. 180 tablet 3    tirzepatide-Weight Management (ZEPBOUND) 10 MG/0.5ML  prefilled pen Inject 0.5 mLs (10 mg) subcutaneously every 7 days. 2 mL 0    tirzepatide-Weight Management (ZEPBOUND) 2.5 MG/0.5ML prefilled pen Inject 0.5 mLs (2.5 mg) subcutaneously every 7 days. 2 mL 0    [START ON 1/17/2025] tirzepatide-Weight Management (ZEPBOUND) 5 MG/0.5ML prefilled pen Inject 0.5 mLs (5 mg) subcutaneously every 7 days. 2 mL 0    [START ON 1/16/2025] tirzepatide-Weight Management (ZEPBOUND) 7.5 MG/0.5ML prefilled pen Inject 0.5 mLs (7.5 mg) subcutaneously every 7 days. 2 mL 0     Allergies   Allergen Reactions    Seasonal Allergies      Cold symptoms, stuffy nose, itchy throat and eyes     Recent Labs   Lab Test 10/02/23  1159 05/02/22  1220 09/24/21  1511 11/13/20  0748 06/30/17  1108   *  --   --  125* 97   HDL 51  --   --  56 55   TRIG 253*  --   --  168* 128   CR 0.65 0.69   < >  --  0.72   GFRESTIMATED >90 >90   < >  --  >90  Non  GFR Calc     GFRESTBLACK  --   --   --   --  >90  African American GFR Calc     POTASSIUM 4.9 4.6   < >  --  4.3   TSH 1.26  --   --  1.51  --     < > = values in this interval not displayed.          Review of Systems  CONSTITUTIONAL: NEGATIVE for fever, chills, change in weight  INTEGUMENTARY/SKIN: NEGATIVE for worrisome rashes, moles or lesions  EYES: NEGATIVE for vision changes or irritation  ENT/MOUTH: NEGATIVE for ear, mouth and throat problems  RESP: NEGATIVE for significant cough or SOB  BREAST: NEGATIVE for masses, tenderness or discharge  CV: NEGATIVE for chest pain, palpitations or peripheral edema  GI: NEGATIVE for nausea, abdominal pain, heartburn, or change in bowel habits  : NEGATIVE for frequency, dysuria, or hematuria  MUSCULOSKELETAL: NEGATIVE for significant arthralgias or myalgia  NEURO: NEGATIVE for weakness, dizziness or paresthesias  ENDOCRINE: NEGATIVE for temperature intolerance, skin/hair changes  HEME: NEGATIVE for bleeding problems  PSYCHIATRIC: NEGATIVE for changes in mood or affect     Objective    Exam  BP  "116/80   Pulse 88   Temp 98.1  F (36.7  C) (Oral)   Resp 18   Ht 1.69 m (5' 6.54\")   Wt 119.4 kg (263 lb 3.2 oz)   LMP 04/25/2022 (Exact Date)   SpO2 96%   BMI 41.80 kg/m     Estimated body mass index is 41.8 kg/m  as calculated from the following:    Height as of this encounter: 1.69 m (5' 6.54\").    Weight as of this encounter: 119.4 kg (263 lb 3.2 oz).    Physical Exam  GENERAL: alert and no distress  EYES: Eyes grossly normal to inspection, PERRL and conjunctivae and sclerae normal  HENT: ear canals and TM's normal, nose and mouth without ulcers or lesions  NECK: no adenopathy, no asymmetry, masses, or scars  RESP: lungs clear to auscultation - no rales, rhonchi or wheezes  BREAST: Deferred per guidelines-asymptomatic per patient.  Discussed self breast exam, symptoms to watch out for and when to follow-up, has mammogram scheduled  CV: regular rate and rhythm, normal S1 S2, no S3 or S4, no murmur, click or rub, no peripheral edema  ABDOMEN: soft, nontender, no hepatosplenomegaly, no masses and bowel sounds normal   (female): deferred per patient no concerns  MS: no gross musculoskeletal defects noted, no edema, no CVA tenderness   SKIN: no suspicious lesions or rashes  NEURO: Normal strength and tone, cranial nerves intact, mentation intact and speech normal  PSYCH: mentation appears normal, affect normal/bright  LYMPH: no cervical, supraclavicular adenopathy        Signed Electronically by: SOPHIE MIX    Answers submitted by the patient for this visit:  Patient Health Questionnaire (Submitted on 11/18/2024)  If you checked off any problems, how difficult have these problems made it for you to do your work, take care of things at home, or get along with other people?: Somewhat difficult  PHQ9 TOTAL SCORE: 10  Patient Health Questionnaire (G7) (Submitted on 11/18/2024)  ROMANA 7 TOTAL SCORE: 15    "

## 2024-11-18 NOTE — PATIENT INSTRUCTIONS
Patient Education   Preventive Care Advice   This is general advice given by our system to help you stay healthy. However, your care team may have specific advice just for you. Please talk to your care team about your preventive care needs.  Nutrition  Eat 5 or more servings of fruits and vegetables each day.  Try wheat bread, brown rice and whole grain pasta (instead of white bread, rice, and pasta).  Get enough calcium and vitamin D. Check the label on foods and aim for 100% of the RDA (recommended daily allowance).  Lifestyle  Exercise at least 150 minutes each week  (30 minutes a day, 5 days a week).  Do muscle strengthening activities 2 days a week. These help control your weight and prevent disease.  No smoking.  Wear sunscreen to prevent skin cancer.  Have a dental exam and cleaning every 6 months.  Yearly exams  See your health care team every year to talk about:  Any changes in your health.  Any medicines your care team has prescribed.  Preventive care, family planning, and ways to prevent chronic diseases.  Shots (vaccines)   HPV shots (up to age 26), if you've never had them before.  Hepatitis B shots (up to age 59), if you've never had them before.  COVID-19 shot: Get this shot when it's due.  Flu shot: Get a flu shot every year.  Tetanus shot: Get a tetanus shot every 10 years.  Pneumococcal, hepatitis A, and RSV shots: Ask your care team if you need these based on your risk.  Shingles shot (for age 50 and up)  General health tests  Diabetes screening:  Starting at age 35, Get screened for diabetes at least every 3 years.  If you are younger than age 35, ask your care team if you should be screened for diabetes.  Cholesterol test: At age 39, start having a cholesterol test every 5 years, or more often if advised.  Bone density scan (DEXA): At age 50, ask your care team if you should have this scan for osteoporosis (brittle bones).  Hepatitis C: Get tested at least once in your life.  STIs (sexually  transmitted infections)  Before age 24: Ask your care team if you should be screened for STIs.  After age 24: Get screened for STIs if you're at risk. You are at risk for STIs (including HIV) if:  You are sexually active with more than one person.  You don't use condoms every time.  You or a partner was diagnosed with a sexually transmitted infection.  If you are at risk for HIV, ask about PrEP medicine to prevent HIV.  Get tested for HIV at least once in your life, whether you are at risk for HIV or not.  Cancer screening tests  Cervical cancer screening: If you have a cervix, begin getting regular cervical cancer screening tests starting at age 21.  Breast cancer scan (mammogram): If you've ever had breasts, begin having regular mammograms starting at age 40. This is a scan to check for breast cancer.  Colon cancer screening: It is important to start screening for colon cancer at age 45.  Have a colonoscopy test every 10 years (or more often if you're at risk) Or, ask your provider about stool tests like a FIT test every year or Cologuard test every 3 years.  To learn more about your testing options, visit:   .  For help making a decision, visit:   https://bit.ly/mi31518.  Prostate cancer screening test: If you have a prostate, ask your care team if a prostate cancer screening test (PSA) at age 55 is right for you.  Lung cancer screening: If you are a current or former smoker ages 50 to 80, ask your care team if ongoing lung cancer screenings are right for you.  For informational purposes only. Not to replace the advice of your health care provider. Copyright   2023 [facility]. All rights reserved. Clinically reviewed by the [facility]. DEMANDIT [id number] - REV 01/24.  Learning About Stress  What is stress?     Stress is your body's response to a hard situation. Your body can have a physical, emotional, or mental response. Stress is a fact of life for most people, and it  affects everyone differently. What causes stress for you may not be stressful for someone else.  A lot of things can cause stress. You may feel stress when you go on a job interview, take a test, or run a race. This kind of short-term stress is normal and even useful. It can help you if you need to work hard or react quickly. For example, stress can help you finish an important job on time.  Long-term stress is caused by ongoing stressful situations or events. Examples of long-term stress include long-term health problems, ongoing problems at work, or conflicts in your family. Long-term stress can harm your health.  How does stress affect your health?  When you are stressed, your body responds as though you are in danger. It makes hormones that speed up your heart, make you breathe faster, and give you a burst of energy. This is called the fight-or-flight stress response. If the stress is over quickly, your body goes back to normal and no harm is done.  But if stress happens too often or lasts too long, it can have bad effects. Long-term stress can make you more likely to get sick, and it can make symptoms of some diseases worse. If you tense up when you are stressed, you may develop neck, shoulder, or low back pain. Stress is linked to high blood pressure and heart disease.  Stress also harms your emotional health. It can make you pabon, tense, or depressed. Your relationships may suffer, and you may not do well at work or school.  What can you do to manage stress?  You can try these things to help manage stress:   Do something active. Exercise or activity can help reduce stress. Walking is a great way to get started. Even everyday activities such as housecleaning or yard work can help.  Try yoga or momo chi. These techniques combine exercise and meditation. You may need some training at first to learn them.  Do something you enjoy. For example, listen to music or go to a movie. Practice your hobby or do volunteer  "work.  Meditate. This can help you relax, because you are not worrying about what happened before or what may happen in the future.  Do guided imagery. Imagine yourself in any setting that helps you feel calm. You can use online videos, books, or a teacher to guide you.  Do breathing exercises. For example:  From a standing position, bend forward from the waist with your knees slightly bent. Let your arms dangle close to the floor.  Breathe in slowly and deeply as you return to a standing position. Roll up slowly and lift your head last.  Hold your breath for just a few seconds in the standing position.  Breathe out slowly and bend forward from the waist.  Let your feelings out. Talk, laugh, cry, and express anger when you need to. Talking with supportive friends or family, a counselor, or a antonia leader about your feelings is a healthy way to relieve stress. Avoid discussing your feelings with people who make you feel worse.  Write. It may help to write about things that are bothering you. This helps you find out how much stress you feel and what is causing it. When you know this, you can find better ways to cope.  What can you do to prevent stress?  You might try some of these things to help prevent stress:  Manage your time. This helps you find time to do the things you want and need to do.  Get enough sleep. Your body recovers from the stresses of the day while you are sleeping.  Get support. Your family, friends, and community can make a difference in how you experience stress.  Limit your news feed. Avoid or limit time on social media or news that may make you feel stressed.  Do something active. Exercise or activity can help reduce stress. Walking is a great way to get started.  Where can you learn more?  Go to https://www.HDF.net/patiented  Enter N032 in the search box to learn more about \"Learning About Stress.\"  Current as of: October 24, 2023  Content Version: 14.2 2024 [employer]. "   Care instructions adapted under license by your healthcare professional. If you have questions about a medical condition or this instruction, always ask your healthcare professional. Healthwise, [facility] disclaims any warranty or liability for your use of this information.    Learning About Depression Screening  What is depression screening?  Depression screening is a way to see if you have depression symptoms. It may be done by a doctor or counselor. It's often part of a routine checkup. That's because your mental health is just as important as your physical health.  Depression is a mental health condition that affects how you feel, think, and act. You may:  Have less energy.  Lose interest in your daily activities.  Feel sad and grouchy for a long time.  Depression is very common. It affects people of all ages.  Many things can lead to depression. Some people become depressed after they have a stroke or find out they have a major illness like cancer or heart disease. The death of a loved one or a breakup may lead to depression. It can run in families. Most experts believe that a combination of inherited genes and stressful life events can cause it.  What happens during screening?  You may be asked to fill out a form about your depression symptoms. You and the doctor will discuss your answers. The doctor may ask you more questions to learn more about how you think, act, and feel.  What happens after screening?  If you have symptoms of depression, your doctor will talk to you about your options.  Doctors usually treat depression with medicines or counseling. Often, combining the two works best. Many people don't get help because they think that they'll get over the depression on their own. But people with depression may not get better unless they get treatment.  The cause of depression is not well understood. There may be many factors involved. But if you have depression, it's not your fault.  A serious  "symptom of depression is thinking about death or suicide. If you or someone you care about talks about this or about feeling hopeless, get help right away.  It's important to know that depression can be treated. Medicine, counseling, and self-care may help.  Where can you learn more?  Go to https://www.SupportBee.net/patiented  Enter T185 in the search box to learn more about \"Learning About Depression Screening.\"  Current as of: June 24, 2023  Content Version: 14.2 2024 [employer].   Care instructions adapted under license by your healthcare professional. If you have questions about a medical condition or this instruction, always ask your healthcare professional. Healthwise, [employer] disclaims any warranty or liability for your use of this information.    Substance Use Disorder: Care Instructions  Overview     You can improve your life and health by stopping your use of alcohol or drugs. When you don't drink or use drugs, you may feel and sleep better. You may get along better with your family, friends, and coworkers. There are medicines and programs that can help with substance use disorder.  How can you care for yourself at home?  Here are some ways to help you stay sober and prevent relapse.  If you have been given medicine to help keep you sober or reduce your cravings, be sure to take it exactly as prescribed.  Talk to your doctor about programs that can help you stop using drugs or drinking alcohol.  Do not keep alcohol or drugs in your home.  Plan ahead. Think about what you'll say if other people ask you to drink or use drugs. Try not to spend time with people who drink or use drugs.  Use the time and money spent on drinking or drugs to do something that's important to you.  Preventing a relapse  Have a plan to deal with relapse. Learn to recognize changes in your thinking that lead you to drink or use drugs. Get help before you start to drink or use drugs again.  Try to stay away from " situations, friends, or places that may lead you to drink or use drugs.  If you feel the need to drink alcohol or use drugs again, seek help right away. Call a trusted friend or family member. Some people get support from organizations such as Narcotics Anonymous or [employer] or from treatment facilities.  If you relapse, get help as soon as you can. Some people make a plan with another person that outlines what they want that person to do for them if they relapse. The plan usually includes how to handle the relapse and who to notify in case of relapse.  Don't give up. Remember that a relapse doesn't mean that you have failed. Use the experience to learn the triggers that lead you to drink or use drugs. Then quit again. Recovery is a lifelong process. Many people have several relapses before they are able to quit for good.  Follow-up care is a key part of your treatment and safety. Be sure to make and go to all appointments, and call your doctor if you are having problems. It's also a good idea to know your test results and keep a list of the medicines you take.  When should you call for help?   Call [phone]  anytime you think you may need emergency care. For example, call if you or someone else:    Has overdosed or has withdrawal signs. Be sure to tell the emergency workers that you are or someone else is using or trying to quit using drugs. Overdose or withdrawal signs may include:  Losing consciousness.  Seizure.  Seeing or hearing things that aren't there (hallucinations).     Is thinking or talking about suicide or harming others.   Where to get help 24 hours a day, 7 days a week   If you or someone you know talks about suicide, self-harm, a mental health crisis, a substance use crisis, or any other kind of emotional distress, get help right away. You can:    Call the Suicide and Crisis Lifeline at 570.     Call 0-822-145-TALK (1-842.293.5059).     Text HOME to 419669 to access the Crisis Text Line.   Consider  "saving these numbers in your phone.  Go to [employer] for more information or to chat online.  Call your doctor now or seek immediate medical care if:    You are having withdrawal symptoms. These may include nausea or vomiting, sweating, shakiness, and anxiety.   Watch closely for changes in your health, and be sure to contact your doctor if:    You have a relapse.     You need more help or support to stop.   Where can you learn more?  Go to https://www.Synedgen.net/patiented  Enter H573 in the search box to learn more about \"Substance Use Disorder: Care Instructions.\"  Current as of: November 15, 2023  Content Version: 14.2 2024 [facility] [employer].   Care instructions adapted under license by your healthcare professional. If you have questions about a medical condition or this instruction, always ask your healthcare professional. Healthwise, Incorporated disclaims any warranty or liability for your use of this information.       "

## 2024-11-19 LAB — THYROPEROXIDASE AB SERPL-ACNC: <10 IU/ML

## 2024-11-20 NOTE — TELEPHONE ENCOUNTER
PRIOR AUTHORIZATION DENIED    Medication: ZEPBOUND 2.5 MG/0.5ML SC SOAJ  Insurance Company: [employer]'s - Phone [phone] Fax [phone]  Denial Date: 11/20/2024  Denial Reason(s): Drug is Excluded      Appeal Information: N/A  Patient Notified: No

## 2024-11-20 NOTE — RESULT ENCOUNTER NOTE
Dewey Cerda,    Thank you for your recent office visit.    Here are your recent results.  Normal thyroid level.Your glucose is considered normal under 127 you are not diabetic.  Normal kidney function.  For a nondiabetic your cholesterol is considered normal with the bad cholesterol, the LDL under 190.  This should improve with diet, exercise and weight loss.    Feel free to contact me via [employer] or call the clinic at [phone].    Sincerely,    Yara Tinajero, APRN, FNP-BC

## 2024-11-22 ENCOUNTER — ANCILLARY PROCEDURE (OUTPATIENT)
Dept: MAMMOGRAPHY | Facility: CLINIC | Age: 49
End: 2024-11-22
Attending: NURSE PRACTITIONER
Payer: COMMERCIAL

## 2024-11-22 DIAGNOSIS — Z12.31 VISIT FOR SCREENING MAMMOGRAM: ICD-10-CM

## 2024-11-22 PROCEDURE — 77067 SCR MAMMO BI INCL CAD: CPT

## 2024-11-22 PROCEDURE — 77063 BREAST TOMOSYNTHESIS BI: CPT

## 2024-11-25 ENCOUNTER — TELEPHONE (OUTPATIENT)
Dept: GASTROENTEROLOGY | Facility: CLINIC | Age: 49
End: 2024-11-25
Payer: COMMERCIAL

## 2024-11-25 NOTE — TELEPHONE ENCOUNTER
Pre visit planning completed.      Procedure details:    Patient scheduled for Colonoscopy on 12/6/24.     Arrival time: 0945. Procedure time 1030    Facility location: [facility]; [address] [place] [address]. Check in location: Main entrance, door #1 on the North side of the building under roundabout awning. DO NOT GO TO SURGERY/ED ENTRANCE.     Sedation type: Conscious sedation     Pre op exam needed? No.    Indication for procedure: screening       Chart review:     Electronic implanted devices? No    Recent diagnosis of diverticulitis within the last 6 weeks? No      Medication review:    Diabetic? No    Anticoagulants? No    Weight loss medication/injectable? No GLP-1 medication per patient's medication list. Nursing to verify with pre-assessment call. (It looked like zepbound was not approved, I don't think patient has started anything)    Other medication HOLDING recommendations:  N/A      Prep for procedure:     Bowel prep recommendation: Extended Golytely. Bowel prep prescription sent to [facility] [id number] [place], [place] - [address] by Jonelle    Due to: BMI > 40.     Prep instructions sent via [employer] by Jonelle Barrett, RN  Endoscopy Procedure Pre Assessment   [phone] option 2

## 2024-11-26 NOTE — TELEPHONE ENCOUNTER
Pre assessment completed for upcoming procedure.   (Please see previous telephone encounter notes for complete details)    Patient  returned call.       Procedure details:    Arrival time and facility location reviewed.    Pre op exam needed? No.    Designated  policy reviewed. Instructed to have someone stay 6  hours post procedure.       Medication review:    Medications reviewed. Please see supporting documentation below. Holding recommendations discussed (if applicable).   Patient is currently not taking the Zepbound.     Prep for procedure:     Procedure prep instructions reviewed.        Any additional information needed:  N/A      Patient  verbalized understanding and had no questions or concerns at this time.      Claudia Magdaleno RN  Endoscopy Procedure Pre Assessment   [phone] option 2

## 2024-11-26 NOTE — TELEPHONE ENCOUNTER
Attempted to contact patient in order to complete pre assessment questions.     No answer. Left message to return call to [phone] option 2    Callback required communication sent via [employer].      Jonelle Wallis RN  Endoscopy Procedure Pre Assessment

## 2024-12-02 ENCOUNTER — MYC MEDICAL ADVICE (OUTPATIENT)
Dept: FAMILY MEDICINE | Facility: CLINIC | Age: 49
End: 2024-12-02
Payer: COMMERCIAL

## 2024-12-02 DIAGNOSIS — E66.01 MORBID OBESITY (H): Primary | ICD-10-CM

## 2024-12-02 DIAGNOSIS — I10 BENIGN ESSENTIAL HTN: ICD-10-CM

## 2024-12-02 DIAGNOSIS — R63.5 ABNORMAL WEIGHT GAIN: ICD-10-CM

## 2024-12-04 ENCOUNTER — TELEPHONE (OUTPATIENT)
Dept: FAMILY MEDICINE | Facility: CLINIC | Age: 49
End: 2024-12-04
Payer: COMMERCIAL

## 2024-12-04 NOTE — TELEPHONE ENCOUNTER
Retail Pharmacy Prior Authorization Team   Phone: [phone]    INSURANCE CLOSED OUT EPA DUE TO DRUG NOT COVERED BY PLAN

## 2024-12-06 ENCOUNTER — HOSPITAL ENCOUNTER (OUTPATIENT)
Facility: CLINIC | Age: 49
Discharge: HOME | End: 2024-12-06
Attending: INTERNAL MEDICINE | Admitting: INTERNAL MEDICINE
Payer: COMMERCIAL

## 2024-12-06 VITALS
OXYGEN SATURATION: 96 % | DIASTOLIC BLOOD PRESSURE: 67 MMHG | WEIGHT: 260 LBS | RESPIRATION RATE: 16 BRPM | HEART RATE: 78 BPM | SYSTOLIC BLOOD PRESSURE: 112 MMHG | BODY MASS INDEX: 41.29 KG/M2

## 2024-12-06 LAB — COLONOSCOPY: NORMAL

## 2024-12-06 PROCEDURE — 250N000011 HC RX IP 250 OP 636: Performed by: INTERNAL MEDICINE

## 2024-12-06 PROCEDURE — G0121 COLON CA SCRN NOT HI RSK IND: HCPCS | Performed by: INTERNAL MEDICINE

## 2024-12-06 PROCEDURE — 45378 DIAGNOSTIC COLONOSCOPY: CPT | Performed by: INTERNAL MEDICINE

## 2024-12-06 PROCEDURE — G0500 MOD SEDAT ENDO SERVICE >5YRS: HCPCS | Performed by: INTERNAL MEDICINE

## 2024-12-06 RX ORDER — NALOXONE HYDROCHLORIDE 0.4 MG/ML
0.4 INJECTION, SOLUTION INTRAMUSCULAR; INTRAVENOUS; SUBCUTANEOUS
Status: DISCONTINUED | OUTPATIENT
Start: 2024-12-06 | End: 2024-12-06 | Stop reason: HOSPADM

## 2024-12-06 RX ORDER — FLUMAZENIL 0.1 MG/ML
0.2 INJECTION, SOLUTION INTRAVENOUS
Status: DISCONTINUED | OUTPATIENT
Start: 2024-12-06 | End: 2024-12-06 | Stop reason: HOSPADM

## 2024-12-06 RX ORDER — ONDANSETRON 2 MG/ML
4 INJECTION INTRAMUSCULAR; INTRAVENOUS
Status: DISCONTINUED | OUTPATIENT
Start: 2024-12-06 | End: 2024-12-06 | Stop reason: HOSPADM

## 2024-12-06 RX ORDER — ONDANSETRON 4 MG/1
4 TABLET, ORALLY DISINTEGRATING ORAL EVERY 6 HOURS PRN
Status: DISCONTINUED | OUTPATIENT
Start: 2024-12-06 | End: 2024-12-06 | Stop reason: HOSPADM

## 2024-12-06 RX ORDER — EPINEPHRINE 1 MG/ML
0.1 INJECTION, SOLUTION, CONCENTRATE INTRAVENOUS
Status: DISCONTINUED | OUTPATIENT
Start: 2024-12-06 | End: 2024-12-06 | Stop reason: HOSPADM

## 2024-12-06 RX ORDER — ATROPINE SULFATE 0.1 MG/ML
1 INJECTION INTRAVENOUS
Status: DISCONTINUED | OUTPATIENT
Start: 2024-12-06 | End: 2024-12-06 | Stop reason: HOSPADM

## 2024-12-06 RX ORDER — NALOXONE HYDROCHLORIDE 0.4 MG/ML
0.2 INJECTION, SOLUTION INTRAMUSCULAR; INTRAVENOUS; SUBCUTANEOUS
Status: DISCONTINUED | OUTPATIENT
Start: 2024-12-06 | End: 2024-12-06 | Stop reason: HOSPADM

## 2024-12-06 RX ORDER — LIDOCAINE 40 MG/G
CREAM TOPICAL
Status: DISCONTINUED | OUTPATIENT
Start: 2024-12-06 | End: 2024-12-06 | Stop reason: HOSPADM

## 2024-12-06 RX ORDER — DIPHENHYDRAMINE HYDROCHLORIDE 50 MG/ML
25-50 INJECTION INTRAMUSCULAR; INTRAVENOUS
Status: DISCONTINUED | OUTPATIENT
Start: 2024-12-06 | End: 2024-12-06 | Stop reason: HOSPADM

## 2024-12-06 RX ORDER — ONDANSETRON 2 MG/ML
4 INJECTION INTRAMUSCULAR; INTRAVENOUS EVERY 6 HOURS PRN
Status: DISCONTINUED | OUTPATIENT
Start: 2024-12-06 | End: 2024-12-06 | Stop reason: HOSPADM

## 2024-12-06 RX ORDER — SIMETHICONE 40MG/0.6ML
133 SUSPENSION, DROPS(FINAL DOSAGE FORM)(ML) ORAL
Status: DISCONTINUED | OUTPATIENT
Start: 2024-12-06 | End: 2024-12-06 | Stop reason: HOSPADM

## 2024-12-06 RX ORDER — PROCHLORPERAZINE MALEATE 10 MG
10 TABLET ORAL EVERY 6 HOURS PRN
Status: DISCONTINUED | OUTPATIENT
Start: 2024-12-06 | End: 2024-12-06 | Stop reason: HOSPADM

## 2024-12-06 RX ORDER — FENTANYL CITRATE 50 UG/ML
50-100 INJECTION, SOLUTION INTRAMUSCULAR; INTRAVENOUS EVERY 5 MIN PRN
Status: DISCONTINUED | OUTPATIENT
Start: 2024-12-06 | End: 2024-12-06 | Stop reason: HOSPADM

## 2024-12-06 RX ADMIN — MIDAZOLAM HYDROCHLORIDE 1 MG: 1 INJECTION, SOLUTION INTRAMUSCULAR; INTRAVENOUS at 11:01

## 2024-12-06 RX ADMIN — MIDAZOLAM HYDROCHLORIDE 2 MG: 1 INJECTION, SOLUTION INTRAMUSCULAR; INTRAVENOUS at 10:54

## 2024-12-06 RX ADMIN — FENTANYL CITRATE 100 MCG: 50 INJECTION, SOLUTION INTRAMUSCULAR; INTRAVENOUS at 10:54

## 2024-12-06 ASSESSMENT — ACTIVITIES OF DAILY LIVING (ADL)
ADLS_ACUITY_SCORE: 46
ADLS_ACUITY_SCORE: 46

## 2024-12-06 NOTE — PRE-PROCEDURE
Pre-Endoscopy History and Physical     Zaida Lao MRN# [id number]   YOB: 1975 Age: 49 year old     Date of Procedure: 12/6/2024  Primary care provider: Yara Salcedo  Type of Endoscopy: colonoscopy  Reason for Procedure: screening  Type of Anesthesia Anticipated: Moderate (conscious) sedation    HPI:    Zaida is a 49 year old female who will be undergoing the above procedure.      A history and physical has been performed. The patient's medications and allergies have been reviewed. The risks and benefits of the procedure and the sedation options and risks were discussed with the patient.  All questions were answered and informed consent was obtained.      Allergies   Allergen Reactions    Seasonal Allergies      Cold symptoms, stuffy nose, itchy throat and eyes        Current Facility-Administered Medications   Medication Dose Route Frequency Provider Last Rate Last Admin    atropine injection 1 mg  1 mg Intravenous Once PRN Raj, Crispin Charles MD        benzocaine 20% (HURRICAINE/TOPEX) 20 % spray 0.5 mL  1 spray Mouth/Throat Once PRN Raj, Crispin Charles MD        diphenhydrAMINE (BENADRYL) injection 25-50 mg  25-50 mg Intravenous Once PRN Raj, Crispin Charles MD        EPINEPHrine PF (ADRENALIN) injection 0.1 mg  0.1 mg Submucosal Once PRN Raj, Crispin Charles MD        fentaNYL (PF) (SUBLIMAZE) injection  mcg   mcg Intravenous Q5 Min PRN Raj, Crispin Charles MD        flumazenil (ROMAZICON) injection 0.2 mg  0.2 mg Intravenous q1 min prn Raj, Crispin Charles MD        glucagon injection 0.5 mg  0.5 mg Intravenous Once PRN Crispin Anthony MD        lidocaine (LMX4) cream   Topical Q1H PRN Raj, Crispin Charles MD        lidocaine 1 % 0.1-1 mL  0.1-1 mL Other Q1H PRN Raj, Crispin Charles MD        midazolam (VERSED) injection 0.5-2 mg  0.5-2 mg Intravenous Q4 Min PRN Raj, Crispin Charles MD        naloxone (NARCAN) injection 0.2 mg  0.2 mg Intravenous Q2 Min PRN Raj,  Crispin Charles MD        Or    naloxone (NARCAN) injection 0.4 mg  0.4 mg Intravenous Q2 Min PRN Crsipin Anthony MD        Or    naloxone (NARCAN) injection 0.2 mg  0.2 mg Intramuscular Q2 Min PRN Crispin Anthony MD        Or    naloxone (NARCAN) injection 0.4 mg  0.4 mg Intramuscular Q2 Min PRN Crispin Anthony MD        ondansetron (ZOFRAN) injection 4 mg  4 mg Intravenous Once PRN Crispin Anthony MD        simethicone (MYLICON) suspension 133 mg  133 mg Oral Once PRN Crispin Anthony MD        sodium chloride (PF) 0.9% PF flush 3 mL  3 mL Intracatheter Q8H Crispin Anthony MD        sodium chloride (PF) 0.9% PF flush 3 mL  3 mL Intracatheter q1 min prn Crispin Anthony MD        sodium chloride (PF) 0.9% PF flush 3 mL  3 mL Intravenous q1 min prn Crispin Anthony MD           Patient Active Problem List   Diagnosis    Anxiety    CARDIOVASCULAR SCREENING; LDL GOAL LESS THAN 160    Seasonal allergies    Morbid obesity (H)    Kidney stones    Bilateral occipital neuralgia    Nasal polyp    Benign essential HTN    Insomnia, unspecified type    Nasal obstruction    Nasal septal deviation    Nasal turbinate hypertrophy    Adenoid hypertrophy    Chronic tonsillitis    Class 3 severe obesity due to excess calories with serious comorbidity and body mass index (BMI) of 40.0 to 44.9 in adult (H)    Cervical high risk HPV (human papillomavirus) test positive    Recurrent UTI    Menorrhagia with regular cycle    Hx of abnormal cervical Pap smear    Anxiety attack    Adjustment disorder with mixed anxiety and depressed mood    S/P laparoscopic hysterectomy    Family history of malignant neoplasm of breast    Abnormal weight gain        Past Medical History:   Diagnosis Date    Cervical high risk HPV (human papillomavirus) test positive 11/13/2020    Depressive disorder 2000    I take Lexapro    HTN (hypertension)     Human papillomavirus in conditions classified elsewhere and of unspecified  site     genital warts , resolved    Mild or unspecified pre-eclampsia, antepartum     LOC (obstructive sleep apnea)     Pre-eclampsia     Seasonal allergies     Zyrtec helps and has an Rx for Flonase        Past Surgical History:   Procedure Laterality Date    BIOPSY  2021     SECTION  2011    Procedure: SECTION; Surgeon:CHRISSY GAFFNEY; Location:[facility]     SECTION  2014    Procedure:  SECTION;  Surgeon: Chrissy Gaffney MD;  Location: [facility]    CYSTOSCOPY, SLING TRANSVAGINAL N/A 2022    Procedure: CREATION, VAGINAL SLING, WITH CYSTOSCOPY;  Surgeon: Hany Thurston MD;  Location: [facility] OR    DAVINCI HYSTERECTOMY TOTAL, SALPINGECTOMY BILATERAL N/A 2022    Procedure: HYSTERECTOMY, TOTAL, ROBOT-ASSISTED, WITH BILATERAL SALPINGECTOMY, CYSTOSCOPY;  Surgeon: Shahida Coyne MD;  Location: [facility] OR    GENITOURINARY SURGERY  2009    Lithotripsy    GYN SURGERY      tubal ligation    HC TOOTH EXTRACTION W/FORCEP      one dry socket    RW DENTAL (ABSTRACTED)      SEPTOPLASTY, TURBINOPLASTY, COMBINED Bilateral 2020    Procedure: SEPTOPLASTY, BILATERAL INFERIOR TURBINATE REDUCTION;  Surgeon: Jb Nava MD;  Location: [facility] OR    SURGICAL HISTORY OF -   2009    lithotripsy    TONSILLECTOMY, ADENOIDECTOMY, COMBINED Bilateral 2020    Procedure: BILATERAL TONSILLECTOMY AND ADENOIDECTOMY;  Surgeon: Jb Nava MD;  Location: [facility] OR       Social History     Tobacco Use    Smoking status: Former     Current packs/day: 0.00     Types: Cigarettes     Start date: 1994     Quit date: 2005     Years since quittin.8     Passive exposure: Never    Smokeless tobacco: Never    Tobacco comments:     Social smoker   Substance Use Topics    Alcohol use: Yes     Comment: Socially       Family History   Problem Relation Age of Onset    Eye Disorder Father         detached retina    Genitourinary Problems Father          kidney stones    Macular Degeneration Father     Hypertension Father     Nephrolithiasis Father     Colon Cancer Maternal Grandmother     Breast Cancer Maternal Grandmother             Cancer - colorectal Maternal Grandmother     Circulatory Maternal Grandmother         aneurism    Prostate Cancer Maternal Grandfather             Eye Surgery Maternal Grandfather         cataracts, late in life    Cerebrovascular Disease Paternal Grandmother             C.A.D. Paternal Grandfather          of MI    Glaucoma No family hx of             Medications:     Medications Prior to Admission   Medication Sig Dispense Refill Last Dose/Taking    ALPRAZolam (XANAX) 0.25 MG tablet Take 1 tablet (0.25 mg) by mouth daily as needed for anxiety Do not mix with alcohol or drive after taking 30 tablet 3     fluticasone (FLONASE) 50 MCG/ACT spray Spray 1 spray into both nostrils daily As needed       levocetirizine (XYZAL) 5 MG tablet Take 1 tablet by mouth daily   2024    lisinopril (ZESTRIL) 5 MG tablet Take 1 tablet (5 mg) by mouth daily. 90 tablet 3 2024    semaglutide-weight management (WEGOVY) 0.25 MG/0.5ML pen Inject 0.5 mLs (0.25 mg) subcutaneously once a week. 2 mL 0     [START ON 1/3/2025] Semaglutide-Weight Management (WEGOVY) 0.5 MG/0.5ML pen Inject 0.5 mg subcutaneously once a week. 2 mL 0     Semaglutide-Weight Management (WEGOVY) 1 MG/0.5ML pen Inject 1 mg subcutaneously once a week. 2 mL 0     sertraline (ZOLOFT) 100 MG tablet Take 2 tablets (200 mg) by mouth daily. 180 tablet 3 2024    tirzepatide-Weight Management (ZEPBOUND) 10 MG/0.5ML prefilled pen Inject 0.5 mLs (10 mg) subcutaneously every 7 days. 2 mL 0     tirzepatide-Weight Management (ZEPBOUND) 2.5 MG/0.5ML prefilled pen Inject 0.5 mLs (2.5 mg) subcutaneously every 7 days. 2 mL 0     [START ON 2025] tirzepatide-Weight Management (ZEPBOUND) 5 MG/0.5ML prefilled pen Inject 0.5 mLs (5 mg) subcutaneously every 7 days.  2 mL 0     [START ON 1/16/2025] tirzepatide-Weight Management (ZEPBOUND) 7.5 MG/0.5ML prefilled pen Inject 0.5 mLs (7.5 mg) subcutaneously every 7 days. 2 mL 0        Scheduled Medications:  Current Facility-Administered Medications   Medication Dose Route Frequency Provider Last Rate Last Admin    sodium chloride (PF) 0.9% PF flush 3 mL  3 mL Intracatheter Q8H Raj, Crispin Charles MD           PRN:  Current Facility-Administered Medications   Medication Dose Route Frequency Provider Last Rate Last Admin    atropine injection 1 mg  1 mg Intravenous Once PRN Raj, Crispin Charles MD        benzocaine 20% (HURRICAINE/TOPEX) 20 % spray 0.5 mL  1 spray Mouth/Throat Once PRN Raj, Crispin Charles MD        diphenhydrAMINE (BENADRYL) injection 25-50 mg  25-50 mg Intravenous Once PRN Raj, Crispin Charles MD        EPINEPHrine PF (ADRENALIN) injection 0.1 mg  0.1 mg Submucosal Once PRN Raj, Crispin Charles MD        fentaNYL (PF) (SUBLIMAZE) injection  mcg   mcg Intravenous Q5 Min PRN Crispin Anthony MD        flumazenil (ROMAZICON) injection 0.2 mg  0.2 mg Intravenous q1 min prn Raj, Crispin Charles MD        glucagon injection 0.5 mg  0.5 mg Intravenous Once PRN Crispin Anthony MD        lidocaine (LMX4) cream   Topical Q1H PRN Crispin Anthony MD        lidocaine 1 % 0.1-1 mL  0.1-1 mL Other Q1H PRN Raj, Crispin Charles MD        midazolam (VERSED) injection 0.5-2 mg  0.5-2 mg Intravenous Q4 Min PRN Crispin Anthony MD        naloxone (NARCAN) injection 0.2 mg  0.2 mg Intravenous Q2 Min PRN Crispin Anthony MD        Or    naloxone (NARCAN) injection 0.4 mg  0.4 mg Intravenous Q2 Min PRN Crispin Anthony MD        Or    naloxone (NARCAN) injection 0.2 mg  0.2 mg Intramuscular Q2 Min PRN Crispin Anthony MD        Or    naloxone (NARCAN) injection 0.4 mg  0.4 mg Intramuscular Q2 Min PRN Crispin Anthony MD        ondansetron (ZOFRAN) injection 4 mg  4 mg Intravenous Once PRN  "Crispin Anthony MD        simethicone (MYLICON) suspension 133 mg  133 mg Oral Once PRN Crispin Anthony MD        sodium chloride (PF) 0.9% PF flush 3 mL  3 mL Intracatheter q1 min prn Crispin Anthony MD        sodium chloride (PF) 0.9% PF flush 3 mL  3 mL Intravenous q1 min prn Crispin Anthony MD           PHYSICAL EXAM:   Wt 117.9 kg (260 lb)   LMP 04/25/2022 (Exact Date)   BMI 41.29 kg/m   Estimated body mass index is 41.29 kg/m  as calculated from the following:    Height as of 11/18/24: 1.69 m (5' 6.54\").    Weight as of this encounter: 117.9 kg (260 lb).   RESP: lungs clear to auscultation - no rales, rhonchi or wheezes  CV: regular rates and rhythm    IMPRESSION   ASA Class 2 - Mild systemic disease      Signed Electronically by: Crispin Anthony MD  December 6, 2024    .            " WDL

## 2024-12-13 ENCOUNTER — MYC MEDICAL ADVICE (OUTPATIENT)
Dept: FAMILY MEDICINE | Facility: CLINIC | Age: 49
End: 2024-12-13
Payer: COMMERCIAL

## 2024-12-13 DIAGNOSIS — E66.01 MORBID OBESITY (H): Primary | ICD-10-CM

## 2024-12-13 DIAGNOSIS — R63.5 ABNORMAL WEIGHT GAIN: ICD-10-CM

## 2024-12-13 DIAGNOSIS — I10 BENIGN ESSENTIAL HTN: ICD-10-CM

## 2024-12-17 NOTE — TELEPHONE ENCOUNTER
[employer] message sent to patient.     Thanks,  MELODIE Harvey  [facility]

## 2025-01-22 DIAGNOSIS — E66.01 MORBID OBESITY (H): ICD-10-CM

## 2025-01-22 DIAGNOSIS — R63.5 ABNORMAL WEIGHT GAIN: ICD-10-CM

## 2025-01-22 DIAGNOSIS — I10 BENIGN ESSENTIAL HTN: ICD-10-CM

## 2025-02-16 RX ORDER — TIRZEPATIDE 2.5 MG/.5ML
INJECTION, SOLUTION SUBCUTANEOUS
Refills: 0 | OUTPATIENT
Start: 2025-02-16

## 2025-03-03 DIAGNOSIS — I10 BENIGN ESSENTIAL HTN: ICD-10-CM

## 2025-03-03 DIAGNOSIS — E66.01 MORBID OBESITY (H): ICD-10-CM

## 2025-03-03 DIAGNOSIS — R63.5 ABNORMAL WEIGHT GAIN: ICD-10-CM

## 2025-03-28 ENCOUNTER — MYC MEDICAL ADVICE (OUTPATIENT)
Dept: FAMILY MEDICINE | Facility: CLINIC | Age: 50
End: 2025-03-28
Payer: COMMERCIAL

## 2025-03-28 DIAGNOSIS — E66.01 MORBID OBESITY (H): ICD-10-CM

## 2025-03-28 DIAGNOSIS — I10 BENIGN ESSENTIAL HTN: ICD-10-CM

## 2025-03-28 DIAGNOSIS — R63.5 ABNORMAL WEIGHT GAIN: ICD-10-CM

## 2025-04-30 DIAGNOSIS — R63.5 ABNORMAL WEIGHT GAIN: ICD-10-CM

## 2025-04-30 DIAGNOSIS — I10 BENIGN ESSENTIAL HTN: ICD-10-CM

## 2025-04-30 DIAGNOSIS — E66.01 MORBID OBESITY (H): Primary | ICD-10-CM

## 2025-04-30 NOTE — PROGRESS NOTES
"a. E-scribe: \"[facility]\" or Fax: [phone] or Phone: [phone] b.Select \"compound non-controlled\" or semaglutide powder or Rybelsus 14mg tablets. c.Sig: compounded sublingual semaglutide 2mg/ml take 0.25ml (0.5mg) SL daily for 2 weeks, then increase to 0.5ml (1mg) daily. d.Quantity: 30-90 days (usually 15-45ml). e.Titration after the 1mg dose should be in 0.5mg adjustments every 4-5 weeks if needed.   "

## 2025-06-05 ENCOUNTER — E-VISIT (OUTPATIENT)
Dept: FAMILY MEDICINE | Facility: CLINIC | Age: 50
End: 2025-06-05
Payer: COMMERCIAL

## 2025-06-05 ENCOUNTER — TELEPHONE (OUTPATIENT)
Dept: FAMILY MEDICINE | Facility: CLINIC | Age: 50
End: 2025-06-05
Payer: COMMERCIAL

## 2025-06-05 DIAGNOSIS — B37.31 YEAST INFECTION OF THE VAGINA: Primary | ICD-10-CM

## 2025-06-05 RX ORDER — FLUCONAZOLE 150 MG/1
150 TABLET ORAL ONCE
Qty: 2 TABLET | Refills: 0 | Status: SHIPPED | OUTPATIENT
Start: 2025-06-05 | End: 2025-06-05

## 2025-06-05 NOTE — TELEPHONE ENCOUNTER
Pt returning call back to the call regarding message left for the pt.     Writer relayed message below, regarding pt needing an office visit for Diflucan prescription. Pt stated that if she is needing an appt, she asked if there was one available tomorrow 6/6 at 4 pm.     Writer informed pt that the only available appointment would be at 12pm tomorrow, pt declined. RN advised that another option would be to complete an evisit. Pt agreeable to this, pt also will attatch pictures.     Flavia Edwards RN

## 2025-06-05 NOTE — TELEPHONE ENCOUNTER
Provider E-Visit time total (minutes): 5 minutes      Sx: Patient reports vaginal itching, vaginal discharge white thick onset last week.  Similar symptoms 10 years ago treated with Diflucan for yeast infection.  Tried Monistat without resolution.    Tx: Will send Diflucan 2 pills, take 1 pill if symptoms persist on day 3 take second pill.  If symptoms are persisting or worsening, in for in person evaluation.  Tips given on after visit summary.    (B37.31) Yeast infection of the vagina  (primary encounter diagnosis)  Comment:   Plan: fluconazole (DIFLUCAN) 150 MG tablet          REBEKAH Orozco, FNP-BC

## 2025-06-05 NOTE — TELEPHONE ENCOUNTER
Patient needs an office visit for this.    Called [phone] (home)   Did they answer the phone: No, left a message on voicemail to return call to the [facility] at [phone], and to ask for any available triage nurse.  (RN did not leave specific details on voicemail for confidential reasons)      Rebecca OCONNELL RN  Triage Nurse  [facility]

## 2025-06-05 NOTE — TELEPHONE ENCOUNTER
New Medication Request        What medication are you requesting?: Diflucan     Reason for medication request: yeast infection    Have you taken this medication before?: Yes:     Controlled Substance Agreement on file:   CSA -- Patient Level:    CSA: None found at the patient level.         Patient offered an appointment? No    Preferred Pharmacy:   [facility] [id number] [facility] [employer] - [place] - [address]   [address] [place] [address]  Phone: [phone] Fax: [phone]      Could we send this information to you in [employer] or would you prefer to receive a phone call?:   Patient would prefer a phone call   Okay to leave a detailed message?: Yes at Home number on file [phone] (home)

## (undated) DEVICE — BLADE INFERIOR TURBINATE 2.9MMX11CM 1882940HR

## (undated) DEVICE — PACK MINOR SBA15MIFSE

## (undated) DEVICE — GOWN XLG DISP 9545

## (undated) DEVICE — SYR PISTON IRRIGATION 60 ML DYND20325

## (undated) DEVICE — ESU GROUND PAD UNIVERSAL W/O CORD

## (undated) DEVICE — DAVINCI XI DRIVER NDL LARGE 8MM EXT 471006

## (undated) DEVICE — SYR 10ML LL W/O NDL 302995

## (undated) DEVICE — DRAPE SHEET HALF 40X60" 9358

## (undated) DEVICE — LINEN TOWEL PACK X5 5464

## (undated) DEVICE — DAVINCI XI HANDPIECE ESU VESSEL SEALER 8MM EXT 480422

## (undated) DEVICE — DRAPE POUCH IRR 1016

## (undated) DEVICE — PAD PERI INDIV WRAP 11" 2022A

## (undated) DEVICE — DAVINCI XI SEAL UNIVERSAL 5-8MM 470361

## (undated) DEVICE — SOL NACL 0.9% IRRIG 1000ML BOTTLE 2F7124

## (undated) DEVICE — LINEN GOWN X4 5410

## (undated) DEVICE — LIGHT HANDLE X2

## (undated) DEVICE — SOL WATER IRRIG 1000ML BOTTLE 2F7114

## (undated) DEVICE — SU VICRYL 0 UR-6 27" J603H

## (undated) DEVICE — ESU SUCTION CAUTERY 10FR FOOT CONTROL E2505-10FR

## (undated) DEVICE — NDL SPINAL 25GA 3.5" QUINCKE 405180

## (undated) DEVICE — PACK SET-UP STD 9102

## (undated) DEVICE — SUCTION TIP YANKAUER W/O VENT K86

## (undated) DEVICE — GLOVE PROTEXIS W/NEU-THERA 6.5  2D73TE65

## (undated) DEVICE — BASIN SET MINOR DISP

## (undated) DEVICE — KIT POSITIONER PINK PAD XL ADVANCED 40588

## (undated) DEVICE — DAVINCI XI DRAPE ARM 470015

## (undated) DEVICE — SYR 10ML FINGER CONTROL W/O NDL 309695

## (undated) DEVICE — GLOVE PROTEXIS W/NEU-THERA 7.5  2D73TE75

## (undated) DEVICE — NDL SPINAL 22GA 2.5" QUINCKE 405074

## (undated) DEVICE — NDL INSUFFLATION 13GA 120MM C2201

## (undated) DEVICE — SOL WATER IRRIG 1000ML BOTTLE 07139-09

## (undated) DEVICE — TUBING SUCTION 10'X3/16" N510

## (undated) DEVICE — COVER CAMERA IN-LIGHT DISP LT-C02

## (undated) DEVICE — UTERINE MANIPULATOR RUMI 6.7MMX8CM UMB678

## (undated) DEVICE — ESU GROUND PAD ADULT W/CORD E7507

## (undated) DEVICE — GLOVE PROTEXIS W/NEU-THERA 7.0  2D73TE70

## (undated) DEVICE — NDL COUNTER 40CT  31142311

## (undated) DEVICE — ENDO POUCH UNIVERSAL RETRIEVAL SYSTEM INZII 5MM CD003

## (undated) DEVICE — NDL 25GA 1.5" 305127

## (undated) DEVICE — SYR 30ML SLIP TIP W/O NDL 302833

## (undated) DEVICE — DECANTER TRANSFER DEVICE 2008S

## (undated) DEVICE — SYR EAR BULB 3OZ 0035830

## (undated) DEVICE — SU STRATAFIX PDS PLUS 0 CT-1 30CM SXPP1B450

## (undated) DEVICE — SUCTION MANIFOLD NEPTUNE 2 SYS 4 PORT 0702-020-000

## (undated) DEVICE — MARKER SKIN DOUBLE TIP W/FLEXI-RULER W/LABELS

## (undated) DEVICE — SOL NACL 0.9% INJ 1000ML BAG 2B1324X

## (undated) DEVICE — Device

## (undated) DEVICE — DAVINCI XI MONOPOLAR SCISSORS HOT SHEARS 8MM 470179

## (undated) DEVICE — SYR 50ML LL W/O NDL 309653

## (undated) DEVICE — SU MONOCRYL 4-0 PS-2 18" UND Y496G

## (undated) DEVICE — PREP POVIDONE IODINE SWABS X3

## (undated) DEVICE — SYR BULB IRRIG DOVER 60 ML LATEX FREE 67000

## (undated) DEVICE — ENDO TROCAR CONMED AIRSEAL BLADELESS 12X120MM IAS12-120LP

## (undated) DEVICE — PAD CHUX UNDERPAD 30X36" P3036C

## (undated) DEVICE — CATH INTERMITTENT CLEAN-CATH 8FR 16" VINYL LF 421708

## (undated) DEVICE — PREP SCRUB SOL EXIDINE 4% CHG 4OZ 29002-404

## (undated) DEVICE — TUBING CONMED AIRSEAL SMOKE EVAC INSUFFLATION ASM-EVAC

## (undated) DEVICE — ADH SKIN CLOSURE PREMIERPRO EXOFIN 1.0ML 3470

## (undated) DEVICE — BLADE KNIFE SURG 11 371111

## (undated) DEVICE — DAVINCI XI OBTURATOR BLADELESS 8MM 470359

## (undated) DEVICE — SPONGE COTTONOID 1/2X3" 80-1407

## (undated) DEVICE — SOL NACL 0.9% IRRIG 3000ML BAG 2B7477

## (undated) DEVICE — BLADE SWITCH SCISSORS TIP 5MM 89-5100B

## (undated) DEVICE — GLOVE PROTEXIS BLUE W/NEU-THERA 7.5  2D73EB75

## (undated) DEVICE — PANTIES MESH LG/XLG 2PK 706M2

## (undated) DEVICE — ANTIFOG SOLUTION W/FOAM PAD CF-1001

## (undated) DEVICE — STABILIZER ARCH KOH-EFFICIENT 3.5CM KC-ARCH-35

## (undated) DEVICE — SUCTION CANISTER MEDIVAC LINER 1500ML W/LID 65651-515

## (undated) DEVICE — ESU PENCIL W/HOLSTER E2350H

## (undated) DEVICE — DRAPE SPLIT EENT 76X124" 3X28" 9447

## (undated) DEVICE — BLADE KNIFE SURG 15 371115

## (undated) DEVICE — DRAPE U SPLIT 74X120" 29440

## (undated) DEVICE — SU CHROMIC 5-0 P-3 18" 687G

## (undated) DEVICE — ESU ELEC NDL 1" COATED/INSULATED E1465

## (undated) DEVICE — SU ETHILON 3-0 FS-1 18" 669H

## (undated) DEVICE — ANTIFOG SOLUTION W/FOAM PAD 31142527

## (undated) DEVICE — CATH TRAY FOLEY SURESTEP 16FR WDRAIN BAG STLK LATEX A300316A

## (undated) DEVICE — DAVINCI XI ESU FORCE BIPOLAR 8MM 471405

## (undated) DEVICE — ENDO TROCAR FIRST ENTRY KII FIOS ADV FIX 05X100MM CFF03

## (undated) DEVICE — LINEN TOWEL PACK X30 5481

## (undated) DEVICE — DRAPE MAYO STAND 23X54 8337

## (undated) DEVICE — SU CHROMIC 4-0 P-3 18" 1654G

## (undated) DEVICE — BARRIER INTERCEED 3X4" 4350

## (undated) DEVICE — SU VICRYL 2-0 SH 27" J317H

## (undated) DEVICE — DEVICE SUTURE PASSER 14GA WECK EFX EFXSP2

## (undated) DEVICE — KIT ENDO TURNOVER/PROCEDURE W/CLEAN A SCOPE LINERS 103888

## (undated) DEVICE — DAVINCI XI DRAPE COLUMN 470341

## (undated) DEVICE — GLOVE PROTEXIS BLUE W/NEU-THERA 7.0  2D73EB70

## (undated) DEVICE — WIPES FOLEY CARE SURESTEP PROVON DFC100

## (undated) DEVICE — DRAPE UNDER BUTTOCK 8483

## (undated) DEVICE — ESU PENCIL W/HOLSTER

## (undated) DEVICE — DAVINCI HOT SHEARS TIP COVER  400180

## (undated) DEVICE — ADAPTER DRAPE ALLY AU-AD

## (undated) DEVICE — TUBING SUCTION MEDI-VAC 1/4"X20' N620A

## (undated) RX ORDER — GABAPENTIN 300 MG/1
CAPSULE ORAL
Status: DISPENSED
Start: 2020-03-09

## (undated) RX ORDER — IBUPROFEN 400 MG/1
TABLET, FILM COATED ORAL
Status: DISPENSED
Start: 2022-05-17

## (undated) RX ORDER — PROPOFOL 10 MG/ML
INJECTION, EMULSION INTRAVENOUS
Status: DISPENSED
Start: 2022-05-17

## (undated) RX ORDER — PHENAZOPYRIDINE HYDROCHLORIDE 100 MG/1
TABLET, FILM COATED ORAL
Status: DISPENSED
Start: 2022-05-17

## (undated) RX ORDER — ONDANSETRON 2 MG/ML
INJECTION INTRAMUSCULAR; INTRAVENOUS
Status: DISPENSED
Start: 2020-03-09

## (undated) RX ORDER — SCOLOPAMINE TRANSDERMAL SYSTEM 1 MG/1
PATCH, EXTENDED RELEASE TRANSDERMAL
Status: DISPENSED
Start: 2022-05-17

## (undated) RX ORDER — FENTANYL CITRATE-0.9 % NACL/PF 10 MCG/ML
PLASTIC BAG, INJECTION (ML) INTRAVENOUS
Status: DISPENSED
Start: 2022-05-17

## (undated) RX ORDER — HYDROMORPHONE HYDROCHLORIDE 1 MG/ML
INJECTION, SOLUTION INTRAMUSCULAR; INTRAVENOUS; SUBCUTANEOUS
Status: DISPENSED
Start: 2022-05-17

## (undated) RX ORDER — FENTANYL CITRATE 50 UG/ML
INJECTION, SOLUTION INTRAMUSCULAR; INTRAVENOUS
Status: DISPENSED
Start: 2022-05-17

## (undated) RX ORDER — CEFAZOLIN SODIUM 1 G/3ML
INJECTION, POWDER, FOR SOLUTION INTRAMUSCULAR; INTRAVENOUS
Status: DISPENSED
Start: 2022-05-17

## (undated) RX ORDER — LIDOCAINE HYDROCHLORIDE 40 MG/ML
SOLUTION TOPICAL
Status: DISPENSED
Start: 2020-03-09

## (undated) RX ORDER — CEFAZOLIN SODIUM/WATER 2 G/20 ML
SYRINGE (ML) INTRAVENOUS
Status: DISPENSED
Start: 2022-05-17

## (undated) RX ORDER — ROCURONIUM BROMIDE 50 MG/5 ML
SYRINGE (ML) INTRAVENOUS
Status: DISPENSED
Start: 2022-05-17

## (undated) RX ORDER — ACETAMINOPHEN 325 MG/1
TABLET ORAL
Status: DISPENSED
Start: 2022-05-17

## (undated) RX ORDER — BUPIVACAINE HYDROCHLORIDE 2.5 MG/ML
INJECTION, SOLUTION EPIDURAL; INFILTRATION; INTRACAUDAL
Status: DISPENSED
Start: 2020-03-09

## (undated) RX ORDER — LIDOCAINE HYDROCHLORIDE 20 MG/ML
INJECTION, SOLUTION EPIDURAL; INFILTRATION; INTRACAUDAL; PERINEURAL
Status: DISPENSED
Start: 2022-05-17

## (undated) RX ORDER — ONDANSETRON 2 MG/ML
INJECTION INTRAMUSCULAR; INTRAVENOUS
Status: DISPENSED
Start: 2022-05-17

## (undated) RX ORDER — LIDOCAINE HYDROCHLORIDE AND EPINEPHRINE 10; 10 MG/ML; UG/ML
INJECTION, SOLUTION INFILTRATION; PERINEURAL
Status: DISPENSED
Start: 2020-03-09

## (undated) RX ORDER — DEXAMETHASONE SODIUM PHOSPHATE 4 MG/ML
INJECTION, SOLUTION INTRA-ARTICULAR; INTRALESIONAL; INTRAMUSCULAR; INTRAVENOUS; SOFT TISSUE
Status: DISPENSED
Start: 2022-05-17

## (undated) RX ORDER — OXYCODONE HYDROCHLORIDE 5 MG/1
TABLET ORAL
Status: DISPENSED
Start: 2020-03-09

## (undated) RX ORDER — FENTANYL CITRATE 50 UG/ML
INJECTION, SOLUTION INTRAMUSCULAR; INTRAVENOUS
Status: DISPENSED
Start: 2020-03-09

## (undated) RX ORDER — LIDOCAINE HYDROCHLORIDE 20 MG/ML
JELLY TOPICAL
Status: DISPENSED
Start: 2021-11-26

## (undated) RX ORDER — PROPOFOL 10 MG/ML
INJECTION, EMULSION INTRAVENOUS
Status: DISPENSED
Start: 2020-03-09

## (undated) RX ORDER — LIDOCAINE HYDROCHLORIDE AND EPINEPHRINE 10; 10 MG/ML; UG/ML
INJECTION, SOLUTION INFILTRATION; PERINEURAL
Status: DISPENSED
Start: 2022-05-17

## (undated) RX ORDER — OXYCODONE HYDROCHLORIDE 5 MG/1
TABLET ORAL
Status: DISPENSED
Start: 2022-05-17

## (undated) RX ORDER — FENTANYL CITRATE 50 UG/ML
INJECTION, SOLUTION INTRAMUSCULAR; INTRAVENOUS
Status: DISPENSED
Start: 2024-12-06

## (undated) RX ORDER — DEXAMETHASONE SODIUM PHOSPHATE 4 MG/ML
INJECTION, SOLUTION INTRA-ARTICULAR; INTRALESIONAL; INTRAMUSCULAR; INTRAVENOUS; SOFT TISSUE
Status: DISPENSED
Start: 2020-03-09

## (undated) RX ORDER — GINSENG 100 MG
CAPSULE ORAL
Status: DISPENSED
Start: 2020-03-09

## (undated) RX ORDER — CEFAZOLIN SODIUM 2 G/100ML
INJECTION, SOLUTION INTRAVENOUS
Status: DISPENSED
Start: 2020-03-09

## (undated) RX ORDER — ACETAMINOPHEN 325 MG/1
TABLET ORAL
Status: DISPENSED
Start: 2020-03-09